# Patient Record
Sex: MALE | Race: WHITE | Employment: OTHER | URBAN - METROPOLITAN AREA
[De-identification: names, ages, dates, MRNs, and addresses within clinical notes are randomized per-mention and may not be internally consistent; named-entity substitution may affect disease eponyms.]

---

## 2019-02-07 ENCOUNTER — OFFICE VISIT (OUTPATIENT)
Dept: FAMILY MEDICINE CLINIC | Age: 74
End: 2019-02-07

## 2019-02-07 VITALS
OXYGEN SATURATION: 97 % | HEART RATE: 72 BPM | HEIGHT: 66 IN | BODY MASS INDEX: 28.61 KG/M2 | WEIGHT: 178 LBS | RESPIRATION RATE: 20 BRPM | SYSTOLIC BLOOD PRESSURE: 161 MMHG | DIASTOLIC BLOOD PRESSURE: 87 MMHG | TEMPERATURE: 95.6 F

## 2019-02-07 DIAGNOSIS — R73.03 BORDERLINE DIABETES MELLITUS: ICD-10-CM

## 2019-02-07 DIAGNOSIS — Z13.29 SCREENING FOR THYROID DISORDER: ICD-10-CM

## 2019-02-07 DIAGNOSIS — I10 HYPERTENSION GOAL BP (BLOOD PRESSURE) < 140/80: ICD-10-CM

## 2019-02-07 DIAGNOSIS — Z12.5 SCREENING PSA (PROSTATE SPECIFIC ANTIGEN): ICD-10-CM

## 2019-02-07 DIAGNOSIS — H61.23 BILATERAL IMPACTED CERUMEN: Primary | ICD-10-CM

## 2019-02-07 DIAGNOSIS — E78.5 DYSLIPIDEMIA (HIGH LDL; LOW HDL): ICD-10-CM

## 2019-02-07 DIAGNOSIS — R35.0 FREQUENCY OF URINATION: ICD-10-CM

## 2019-02-07 DIAGNOSIS — Z00.00 ENCOUNTER FOR MEDICARE ANNUAL WELLNESS EXAM: ICD-10-CM

## 2019-02-07 DIAGNOSIS — H91.93 HEARING DIFFICULTY OF BOTH EARS: ICD-10-CM

## 2019-02-07 DIAGNOSIS — E55.9 VITAMIN D DEFICIENCY: ICD-10-CM

## 2019-02-07 RX ORDER — VALSARTAN 40 MG/1
40 TABLET ORAL DAILY
Qty: 30 TAB | Refills: 3 | Status: SHIPPED | OUTPATIENT
Start: 2019-02-07 | End: 2019-03-01 | Stop reason: SDUPTHER

## 2019-02-07 NOTE — PROGRESS NOTES
Chief Complaint Patient presents with  New Patient  Dizziness  Memory Loss HPI: 
Fredrick Velasquez is a 74 y.o.   male presents to establish care. This is a Subsequent Medicare Annual Wellness Exam (AWV) (Performed 12 months after IPPE or effective date of Medicare Part B enrollment) I have reviewed the patient's medical history in detail and updated the computerized patient record. History History reviewed. No pertinent past medical history. History reviewed. No pertinent surgical history. No Known Allergies History reviewed. No pertinent family history. Social History Tobacco Use  Smoking status: Never Smoker  Smokeless tobacco: Never Used Substance Use Topics  Alcohol use: Yes Comment: socially There is no problem list on file for this patient. Depression Risk Factor Screening: PHQ over the last two weeks 2/7/2019 Feeling down, depressed, irritable, or hopeless Not at all Alcohol Risk Factor Screening: You average more than 14 drinks a week. Functional Ability and Level of Safety:  
Hearing Loss Hearing is good. The patient needs further evaluation. Activities of Daily Living The home contains: no safety equipment. Patient needs help with:  transportation, shopping, preparing meals, housework and managing money Fall Risk Fall Risk Assessment, last 12 mths 2/7/2019 Able to walk? Yes Fall in past 12 months? Yes Fall with injury? Yes  
Number of falls in past 12 months 4 Fall Risk Score 5 Abuse Screen Patient is not abused Cognitive Screening Evaluation of Cognitive Function: 
Has your family/caregiver stated any concerns about your memory: no 
Normal 
 
Patient Care Team  
Patient Care Team: 
Peggyann Hashimoto, MD as PCP - General (Internal Medicine) Assessment/Plan Education and counseling provided: 
Are appropriate based on today's review and evaluation Diagnoses and all orders for this visit: 
 
 Bilateral impacted cerumen -     REMOVE IMPACTED EAR WAX Encounter for Medicare annual wellness exam 
-     METABOLIC PANEL, COMPREHENSIVE 
-     CBC WITH AUTOMATED DIFF Hypertension goal BP (blood pressure) < 181/94 
-     METABOLIC PANEL, COMPREHENSIVE 
-     valsartan (DIOVAN) 40 mg tablet; Take 1 Tab by mouth daily. , Print, Disp-30 Tab, R-3 Dyslipidemia (high LDL; low HDL) -     LIPID PANEL Borderline diabetes mellitus 
-     HEMOGLOBIN A1C WITH EAG Screening for thyroid disorder 
-     TSH 3RD GENERATION Vitamin D deficiency 
-     VITAMIN D, 25 HYDROXY Screening PSA (prostate specific antigen) -     PSA SCREENING (SCREENING) Frequency of urination 
-     URINALYSIS W/ RFLX MICROSCOPIC Hearing difficulty of both ears 
-     REFERRAL TO ENT-OTOLARYNGOLOGY Patient Instructions Earwax Blockage: Care Instructions Your Care Instructions Earwax is a natural substance that protects the ear canal. Normally, earwax drains from the ears and does not cause problems. Sometimes earwax builds up and hardens. Earwax blockage (also called cerumen impaction) can cause some loss of hearing and pain. When wax is tightly packed, you will need to have your doctor remove it. Follow-up care is a key part of your treatment and safety. Be sure to make and go to all appointments, and call your doctor if you are having problems. It's also a good idea to know your test results and keep a list of the medicines you take. How can you care for yourself at home? · Do not try to remove earwax with cotton swabs, fingers, or other objects. This can make the blockage worse and damage the eardrum. · If your doctor recommends that you try to remove earwax at home: ? Soften and loosen the earwax with warm mineral oil. You also can try hydrogen peroxide mixed with an equal amount of room temperature water.  Place 2 drops of the fluid, warmed to body temperature, in the ear two times a day for up to 5 days. ? Once the wax is loose and soft, all that is usually needed to remove it from the ear canal is a gentle, warm shower. Direct the water into the ear, then tip your head to let the earwax drain out. Dry your ear thoroughly with a hair dryer set on low. Hold the dryer several inches from your ear. ? If the warm mineral oil and shower do not work, use an over-the-counter wax softener. Read and follow all instructions on the label. After using the wax softener, use an ear syringe to gently flush the ear. Make sure the flushing solution is body temperature. Cool or hot fluids in the ear can cause dizziness. When should you call for help? Call your doctor now or seek immediate medical care if: 
  · Pus or blood drains from your ear.  
  · Your ears are ringing or feel full.  
  · You have a loss of hearing.  
 Watch closely for changes in your health, and be sure to contact your doctor if: 
  · You have pain or reduced hearing after 1 week of home treatment.  
  · You have any new symptoms, such as nausea or balance problems. Where can you learn more? Go to http://engrita-anton.info/. Enter F022 in the search box to learn more about \"Earwax Blockage: Care Instructions. \" Current as of: September 23, 2018 Content Version: 11.9 © 6835-4331 [employer]. Care instructions adapted under license by [employer] (which disclaims liability or warranty for this information). If you have questions about a medical condition or this instruction, always ask your healthcare professional. [place] [address] any warranty or liability for your use of this information. Follow-up Disposition: 
Return in about 4 weeks (around 3/7/2019), or if symptoms worsen or fail to improve, for f/u results.

## 2019-02-07 NOTE — PATIENT INSTRUCTIONS
Earwax Blockage: Care Instructions Your Care Instructions Earwax is a natural substance that protects the ear canal. Normally, earwax drains from the ears and does not cause problems. Sometimes earwax builds up and hardens. Earwax blockage (also called cerumen impaction) can cause some loss of hearing and pain. When wax is tightly packed, you will need to have your doctor remove it. Follow-up care is a key part of your treatment and safety. Be sure to make and go to all appointments, and call your doctor if you are having problems. It's also a good idea to know your test results and keep a list of the medicines you take. How can you care for yourself at home? · Do not try to remove earwax with cotton swabs, fingers, or other objects. This can make the blockage worse and damage the eardrum. · If your doctor recommends that you try to remove earwax at home: ? Soften and loosen the earwax with warm mineral oil. You also can try hydrogen peroxide mixed with an equal amount of room temperature water. Place 2 drops of the fluid, warmed to body temperature, in the ear two times a day for up to 5 days. ? Once the wax is loose and soft, all that is usually needed to remove it from the ear canal is a gentle, warm shower. Direct the water into the ear, then tip your head to let the earwax drain out. Dry your ear thoroughly with a hair dryer set on low. Hold the dryer several inches from your ear. ? If the warm mineral oil and shower do not work, use an over-the-counter wax softener. Read and follow all instructions on the label. After using the wax softener, use an ear syringe to gently flush the ear. Make sure the flushing solution is body temperature. Cool or hot fluids in the ear can cause dizziness. When should you call for help? Call your doctor now or seek immediate medical care if: 
  · Pus or blood drains from your ear.  
  · Your ears are ringing or feel full.  
  · You have a loss of hearing.  Watch closely for changes in your health, and be sure to contact your doctor if: 
  · You have pain or reduced hearing after 1 week of home treatment.  
  · You have any new symptoms, such as nausea or balance problems. Where can you learn more? Go to http://negrita-anton.info/. Enter M796 in the search box to learn more about \"Earwax Blockage: Care Instructions. \" Current as of: September 23, 2018 Content Version: 11.9 © 9231-1023 [employer]. Care instructions adapted under license by [employer] (which disclaims liability or warranty for this information). If you have questions about a medical condition or this instruction, always ask your healthcare professional. [address] any warranty or liability for your use of this information.

## 2019-02-08 ENCOUNTER — HOSPITAL ENCOUNTER (OUTPATIENT)
Dept: LAB | Age: 74
Discharge: HOME | End: 2019-02-08
Payer: MEDICARE

## 2019-02-08 PROCEDURE — 80061 LIPID PANEL: CPT

## 2019-02-08 PROCEDURE — 85025 COMPLETE CBC W/AUTO DIFF WBC: CPT

## 2019-02-08 PROCEDURE — 36415 COLL VENOUS BLD VENIPUNCTURE: CPT

## 2019-02-08 PROCEDURE — 84443 ASSAY THYROID STIM HORMONE: CPT

## 2019-02-08 PROCEDURE — 83036 HEMOGLOBIN GLYCOSYLATED A1C: CPT

## 2019-02-08 PROCEDURE — 80053 COMPREHEN METABOLIC PANEL: CPT

## 2019-02-08 PROCEDURE — 82306 VITAMIN D 25 HYDROXY: CPT

## 2019-02-08 PROCEDURE — 84153 ASSAY OF PSA TOTAL: CPT

## 2019-02-08 PROCEDURE — 81003 URINALYSIS AUTO W/O SCOPE: CPT

## 2019-02-09 LAB
25(OH)D3+25(OH)D2 SERPL-MCNC: 29.9 NG/ML (ref 30–100)
ALBUMIN SERPL-MCNC: 4.5 G/DL (ref 3.5–4.8)
ALBUMIN/GLOB SERPL: 1.7 {RATIO} (ref 1.2–2.2)
ALP SERPL-CCNC: 60 IU/L (ref 39–117)
ALT SERPL-CCNC: 10 IU/L (ref 0–44)
APPEARANCE UR: CLEAR
AST SERPL-CCNC: 13 IU/L (ref 0–40)
BASOPHILS # BLD AUTO: 0 X10E3/UL (ref 0–0.2)
BASOPHILS NFR BLD AUTO: 0 %
BILIRUB SERPL-MCNC: 0.7 MG/DL (ref 0–1.2)
BILIRUB UR QL STRIP: NEGATIVE
BUN SERPL-MCNC: 14 MG/DL (ref 8–27)
BUN/CREAT SERPL: 13 (ref 10–24)
CALCIUM SERPL-MCNC: 9.3 MG/DL (ref 8.6–10.2)
CHLORIDE SERPL-SCNC: 98 MMOL/L (ref 96–106)
CHOLEST SERPL-MCNC: 238 MG/DL (ref 100–199)
CO2 SERPL-SCNC: 21 MMOL/L (ref 20–29)
COLOR UR: YELLOW
CREAT SERPL-MCNC: 1.04 MG/DL (ref 0.76–1.27)
EOSINOPHIL # BLD AUTO: 0.2 X10E3/UL (ref 0–0.4)
EOSINOPHIL NFR BLD AUTO: 3 %
ERYTHROCYTE [DISTWIDTH] IN BLOOD BY AUTOMATED COUNT: 13.6 % (ref 12.3–15.4)
EST. AVERAGE GLUCOSE BLD GHB EST-MCNC: 105 MG/DL
GLOBULIN SER CALC-MCNC: 2.7 G/DL (ref 1.5–4.5)
GLUCOSE SERPL-MCNC: 85 MG/DL (ref 65–99)
GLUCOSE UR QL: NEGATIVE
HBA1C MFR BLD: 5.3 % (ref 4.8–5.6)
HCT VFR BLD AUTO: 41.2 % (ref 37.5–51)
HDLC SERPL-MCNC: 53 MG/DL
HGB BLD-MCNC: 14.3 G/DL (ref 13–17.7)
HGB UR QL STRIP: NEGATIVE
IMM GRANULOCYTES # BLD AUTO: 0 X10E3/UL (ref 0–0.1)
IMM GRANULOCYTES NFR BLD AUTO: 0 %
KETONES UR QL STRIP: NEGATIVE
LDLC SERPL CALC-MCNC: 163 MG/DL (ref 0–99)
LEUKOCYTE ESTERASE UR QL STRIP: NEGATIVE
LYMPHOCYTES # BLD AUTO: 1.6 X10E3/UL (ref 0.7–3.1)
LYMPHOCYTES NFR BLD AUTO: 20 %
MCH RBC QN AUTO: 32.5 PG (ref 26.6–33)
MCHC RBC AUTO-ENTMCNC: 34.7 G/DL (ref 31.5–35.7)
MCV RBC AUTO: 94 FL (ref 79–97)
MICRO URNS: NORMAL
MONOCYTES # BLD AUTO: 1 X10E3/UL (ref 0.1–0.9)
MONOCYTES NFR BLD AUTO: 13 %
NEUTROPHILS # BLD AUTO: 4.8 X10E3/UL (ref 1.4–7)
NEUTROPHILS NFR BLD AUTO: 64 %
NITRITE UR QL STRIP: NEGATIVE
PH UR STRIP: 5.5 [PH] (ref 5–7.5)
PLATELET # BLD AUTO: 328 X10E3/UL (ref 150–379)
POTASSIUM SERPL-SCNC: 4.7 MMOL/L (ref 3.5–5.2)
PROT SERPL-MCNC: 7.2 G/DL (ref 6–8.5)
PROT UR QL STRIP: NEGATIVE
PSA SERPL-MCNC: 1 NG/ML (ref 0–4)
RBC # BLD AUTO: 4.4 X10E6/UL (ref 4.14–5.8)
SODIUM SERPL-SCNC: 135 MMOL/L (ref 134–144)
SP GR UR: 1.02 (ref 1–1.03)
TRIGL SERPL-MCNC: 112 MG/DL (ref 0–149)
TSH SERPL DL<=0.005 MIU/L-ACNC: 1.54 UIU/ML (ref 0.45–4.5)
UROBILINOGEN UR STRIP-MCNC: 0.2 MG/DL (ref 0.2–1)
VLDLC SERPL CALC-MCNC: 22 MG/DL (ref 5–40)
WBC # BLD AUTO: 7.7 X10E3/UL (ref 3.4–10.8)

## 2019-02-15 DIAGNOSIS — E55.9 VITAMIN D DEFICIENCY: Primary | ICD-10-CM

## 2019-02-15 RX ORDER — CHOLECALCIFEROL TAB 125 MCG (5000 UNIT) 125 MCG
5000 TAB ORAL DAILY
Qty: 90 TAB | Refills: 1 | Status: SHIPPED | OUTPATIENT
Start: 2019-02-15 | End: 2019-03-01 | Stop reason: SDUPTHER

## 2019-03-01 ENCOUNTER — OFFICE VISIT (OUTPATIENT)
Dept: FAMILY MEDICINE CLINIC | Age: 74
End: 2019-03-01

## 2019-03-01 VITALS
HEIGHT: 66 IN | WEIGHT: 178 LBS | DIASTOLIC BLOOD PRESSURE: 69 MMHG | BODY MASS INDEX: 28.61 KG/M2 | OXYGEN SATURATION: 96 % | TEMPERATURE: 95.1 F | HEART RATE: 76 BPM | SYSTOLIC BLOOD PRESSURE: 102 MMHG | RESPIRATION RATE: 20 BRPM

## 2019-03-01 DIAGNOSIS — E78.00 HYPERCHOLESTEROLEMIA: ICD-10-CM

## 2019-03-01 DIAGNOSIS — I10 HYPERTENSION, WELL CONTROLLED: Primary | ICD-10-CM

## 2019-03-01 DIAGNOSIS — E55.9 VITAMIN D DEFICIENCY: ICD-10-CM

## 2019-03-01 DIAGNOSIS — R41.3 MEMORY LOSS: ICD-10-CM

## 2019-03-01 RX ORDER — CHOLECALCIFEROL TAB 125 MCG (5000 UNIT) 125 MCG
5000 TAB ORAL DAILY
Qty: 90 TAB | Refills: 1 | Status: SHIPPED | OUTPATIENT
Start: 2019-03-01 | End: 2019-10-17 | Stop reason: SDUPTHER

## 2019-03-01 RX ORDER — PRAVASTATIN SODIUM 10 MG/1
10 TABLET ORAL
Qty: 30 TAB | Refills: 5 | Status: SHIPPED | OUTPATIENT
Start: 2019-03-01 | End: 2019-10-17 | Stop reason: SDUPTHER

## 2019-03-01 RX ORDER — VALSARTAN 40 MG/1
40 TABLET ORAL DAILY
Qty: 30 TAB | Refills: 3 | Status: SHIPPED | OUTPATIENT
Start: 2019-03-01 | End: 2019-07-21 | Stop reason: SDUPTHER

## 2019-03-01 NOTE — PATIENT INSTRUCTIONS
Learning About Vitamin D  Why is it important to get enough vitamin D? Your body needs vitamin D to absorb calcium. Calcium keeps your bones and muscles, including your heart, healthy and strong. If your muscles don't get enough calcium, they can cramp, hurt, or feel weak. You may have long-term (chronic) muscle aches and pains. If you don't get enough vitamin D throughout life, you have an increased chance of having thin and brittle bones (osteoporosis) in your later years. Children who don't get enough vitamin D may not grow as much as others their age. They also have a chance of getting a rare disease called rickets. It causes weak bones. Vitamin D and calcium are added to many foods. And your body uses sunshine to make its own vitamin D. How much vitamin D do you need? The [place] of Medicine recommends that people ages 3 through 79 get 600 IU (international units) every day. Adults 71 and older need 800 IU every day. Blood tests for vitamin D can check your vitamin D level. But there is no standard normal range used by all laboratories. You're likely getting enough vitamin D if your levels are in the range of 20 to 50 ng/mL. How can you get more vitamin D? Foods that contain vitamin D include:  · [place], tuna, and mackerel. These are some of the best foods to eat when you need to get more vitamin D.  · Cheese, egg yolks, and beef liver. These foods have vitamin D in small amounts. · Milk, soy drinks, orange juice, yogurt, margarine, and some kinds of cereal have vitamin D added to them. Some people don't make vitamin D as well as others. They may have to take extra care in getting enough vitamin D. Things that reduce how much vitamin D your body makes include:  · Dark skin, such as many  Americans have. · Age, especially if you are older than 72. · Digestive problems, such as Crohn's or celiac disease. · Liver and kidney disease.   Some people who do not get enough vitamin D may need supplements. Are there any risks from taking vitamin D?  · Too much vitamin D:  ? Can damage your kidneys. ? Can cause nausea and vomiting, constipation, and weakness. ? Raises the amount of calcium in your blood. If this happens, you can get confused or have an irregular heart rhythm. · Vitamin D may interact with other medicines. Tell your doctor about all of the medicines you take, including over-the-counter drugs, herbs, and pills. Tell your doctor about all of your current medical problems. Where can you learn more? Go to http://negrita-anton.info/. Enter [id number] in the search box to learn more about \"Learning About Vitamin D.\"  Current as of: March 28, 2018  Content Version: 11.9  © 9531-9042 [employer]. Care instructions adapted under license by [employer] (which disclaims liability or warranty for this information). If you have questions about a medical condition or this instruction, always ask your healthcare professional. [place] [address] any warranty or liability for your use of this information.

## 2019-03-01 NOTE — PROGRESS NOTES
Chief Complaint   Patient presents with    Follow-up     3-4 weeks    Referral Follow Up     requesting a neurologist     HPI:  Ashely Vazquez is a 74 y.o.  male with hypertesion, memory loss presents for 3-4 week follow up on results. Referral Follow Up (requesting a neurologist)  Serum vit D level is noted to be slightly below normal, tot cholesterol and LDL are trending up. Otherwise the rest of results are within normal limts  Patient agrees to start vit d supplement and statin for cholesterol. Side effects of medications have been discussed. During last encounter he was referred to ENT for difficulty hearing. Patient did see ENT who remamded he gets a neurologic evaluation. He also advised to get hearing aid. Review of Systems  As per hpi    No past surgical history on file. Social History     Socioeconomic History    Marital status:      Spouse name: Not on file    Number of children: Not on file    Years of education: Not on file    Highest education level: Not on file   Tobacco Use    Smoking status: Never Smoker    Smokeless tobacco: Never Used   Substance and Sexual Activity    Alcohol use: Yes     Comment: socially    Drug use: No    Sexual activity: No     No family history on file. Current Outpatient Medications   Medication Sig Dispense Refill    cholecalciferol, VITAMIN D3, (VITAMIN D3) 5,000 unit tab tablet Take 1 Tab by mouth daily. 90 Tab 1    valsartan (DIOVAN) 40 mg tablet Take 1 Tab by mouth daily.  30 Tab 3     No Known Allergies    Objective:  Visit Vitals  /69   Pulse 76   Temp 95.1 °F (35.1 °C) (Oral)   Resp 20   Ht 5' 6\" (1.676 m)   Wt 178 lb (80.7 kg)   SpO2 96%   BMI 28.73 kg/m²     Physical Exam:   General appearance - alert, well appearing in no distress  Mental status - alert, oriented to person, place, and time  EYE-PERRL, EO  Neck - supple, no significant adenopathy   Chest - clear to auscultation, no wheezes, rales or rhonchi  Heart - normal rate, regular rhythm, normal blood pressure  Abdomen - soft, nontender, nondistended, no masses or organomegaly  Ext-peripheral pulses normal, no pedal edema,  Neuro -alert, oriented, normal speech, no focal findings  Back-full range of motion, no tenderness, palpable spasm or pain on motion     Results for orders placed or performed in visit on 49/46/41   METABOLIC PANEL, COMPREHENSIVE   Result Value Ref Range    Glucose 85 65 - 99 mg/dL    BUN 14 8 - 27 mg/dL    Creatinine 1.04 0.76 - 1.27 mg/dL    GFR est non-AA 70 >59 mL/min/1.73    GFR est AA 81 >59 mL/min/1.73    BUN/Creatinine ratio 13 10 - 24    Sodium 135 134 - 144 mmol/L    Potassium 4.7 3.5 - 5.2 mmol/L    Chloride 98 96 - 106 mmol/L    CO2 21 20 - 29 mmol/L    Calcium 9.3 8.6 - 10.2 mg/dL    Protein, total 7.2 6.0 - 8.5 g/dL    Albumin 4.5 3.5 - 4.8 g/dL    GLOBULIN, TOTAL 2.7 1.5 - 4.5 g/dL    A-G Ratio 1.7 1.2 - 2.2    Bilirubin, total 0.7 0.0 - 1.2 mg/dL    Alk. phosphatase 60 39 - 117 IU/L    AST (SGOT) 13 0 - 40 IU/L    ALT (SGPT) 10 0 - 44 IU/L   CBC WITH AUTOMATED DIFF   Result Value Ref Range    WBC 7.7 3.4 - 10.8 x10E3/uL    RBC 4.40 4. 14 - 5.80 x10E6/uL    HGB 14.3 13.0 - 17.7 g/dL    HCT 41.2 37.5 - 51.0 %    MCV 94 79 - 97 fL    MCH 32.5 26.6 - 33.0 pg    MCHC 34.7 31.5 - 35.7 g/dL    RDW 13.6 12.3 - 15.4 %    PLATELET 495 273 - 714 x10E3/uL    NEUTROPHILS 64 Not Estab. %    Lymphocytes 20 Not Estab. %    MONOCYTES 13 Not Estab. %    EOSINOPHILS 3 Not Estab. %    BASOPHILS 0 Not Estab. %    ABS. NEUTROPHILS 4.8 1.4 - 7.0 x10E3/uL    Abs Lymphocytes 1.6 0.7 - 3.1 x10E3/uL    ABS. MONOCYTES 1.0 (H) 0.1 - 0.9 x10E3/uL    ABS. EOSINOPHILS 0.2 0.0 - 0.4 x10E3/uL    ABS. BASOPHILS 0.0 0.0 - 0.2 x10E3/uL    IMMATURE GRANULOCYTES 0 Not Estab. %    ABS. IMM.  GRANS. 0.0 0.0 - 0.1 x10E3/uL   LIPID PANEL   Result Value Ref Range    Cholesterol, total 238 (H) 100 - 199 mg/dL    Triglyceride 112 0 - 149 mg/dL    HDL Cholesterol 53 >39 mg/dL    VLDL, calculated 22 5 - 40 mg/dL    LDL, calculated 163 (H) 0 - 99 mg/dL   HEMOGLOBIN A1C WITH EAG   Result Value Ref Range    Hemoglobin A1c 5.3 4.8 - 5.6 %    Estimated average glucose 105 mg/dL   TSH 3RD GENERATION   Result Value Ref Range    TSH 1.540 0.450 - 4.500 uIU/mL   VITAMIN D, 25 HYDROXY   Result Value Ref Range    VITAMIN D, 25-HYDROXY 29.9 (L) 30.0 - 100.0 ng/mL   PSA SCREENING (SCREENING)   Result Value Ref Range    Prostate Specific Ag 1.0 0.0 - 4.0 ng/mL   URINALYSIS W/ RFLX MICROSCOPIC   Result Value Ref Range    Specific Gravity 1.021 1.005 - 1.030    pH (UA) 5.5 5.0 - 7.5    Color Yellow Yellow    Appearance Clear Clear    Leukocyte Esterase Negative Negative    Protein Negative Negative/Trace    Glucose Negative Negative    Ketone Negative Negative    Blood Negative Negative    Bilirubin Negative Negative    Urobilinogen 0.2 0.2 - 1.0 mg/dL    Nitrites Negative Negative    Microscopic Examination Comment        Assessment/Plan:  Diagnoses and all orders for this visit:    Hypertension, well controlled  -     valsartan (DIOVAN) 40 mg tablet; Take 1 Tab by mouth daily. , Normal, Disp-30 Tab, R-3    Vitamin D deficiency  -     cholecalciferol, VITAMIN D3, (VITAMIN D3) 5,000 unit tab tablet; Take 1 Tab by mouth daily. , Normal, Disp-90 Tab, R-1    Hypercholesterolemia  -     pravastatin (PRAVACHOL) 10 mg tablet; Take 1 Tab by mouth nightly., Normal, Disp-30 Tab, R-5    Memory loss  -     REFERRAL TO NEUROLOGY  -     CT HEAD WO CONT; Future      Patient Instructions        Learning About Vitamin D  Why is it important to get enough vitamin D? Your body needs vitamin D to absorb calcium. Calcium keeps your bones and muscles, including your heart, healthy and strong. If your muscles don't get enough calcium, they can cramp, hurt, or feel weak. You may have long-term (chronic) muscle aches and pains.   If you don't get enough vitamin D throughout life, you have an increased chance of having thin and brittle bones (osteoporosis) in your later years. Children who don't get enough vitamin D may not grow as much as others their age. They also have a chance of getting a rare disease called rickets. It causes weak bones. Vitamin D and calcium are added to many foods. And your body uses sunshine to make its own vitamin D. How much vitamin D do you need? The [place] of Medicine recommends that people ages 3 through 79 get 600 IU (international units) every day. Adults 71 and older need 800 IU every day. Blood tests for vitamin D can check your vitamin D level. But there is no standard normal range used by all laboratories. You're likely getting enough vitamin D if your levels are in the range of 20 to 50 ng/mL. How can you get more vitamin D? Foods that contain vitamin D include:  · [place], tuna, and mackerel. These are some of the best foods to eat when you need to get more vitamin D.  · Cheese, egg yolks, and beef liver. These foods have vitamin D in small amounts. · Milk, soy drinks, orange juice, yogurt, margarine, and some kinds of cereal have vitamin D added to them. Some people don't make vitamin D as well as others. They may have to take extra care in getting enough vitamin D. Things that reduce how much vitamin D your body makes include:  · Dark skin, such as many  Americans have. · Age, especially if you are older than 72. · Digestive problems, such as Crohn's or celiac disease. · Liver and kidney disease. Some people who do not get enough vitamin D may need supplements. Are there any risks from taking vitamin D?  · Too much vitamin D:  ? Can damage your kidneys. ? Can cause nausea and vomiting, constipation, and weakness. ? Raises the amount of calcium in your blood. If this happens, you can get confused or have an irregular heart rhythm. · Vitamin D may interact with other medicines. Tell your doctor about all of the medicines you take, including over-the-counter drugs, herbs, and pills. Tell your doctor about all of your current medical problems. Where can you learn more? Go to http://negrita-anton.info/. Enter [id number] in the search box to learn more about \"Learning About Vitamin D.\"  Current as of: March 28, 2018  Content Version: 11.9  © 8720-4186 [employer]. Care instructions adapted under license by [employer] (which disclaims liability or warranty for this information). If you have questions about a medical condition or this instruction, always ask your healthcare professional. [address] any warranty or liability for your use of this information. Follow-up Disposition:  Return 3- 4 months, for routine follow up.

## 2019-03-08 ENCOUNTER — HOSPITAL ENCOUNTER (OUTPATIENT)
Dept: CT IMAGING | Age: 74
Discharge: HOME | End: 2019-03-08
Attending: INTERNAL MEDICINE
Payer: MEDICARE

## 2019-03-08 DIAGNOSIS — R41.3 MEMORY LOSS: ICD-10-CM

## 2019-03-08 PROCEDURE — 70450 CT HEAD/BRAIN W/O DYE: CPT

## 2019-07-21 DIAGNOSIS — I10 HYPERTENSION, WELL CONTROLLED: ICD-10-CM

## 2019-07-22 RX ORDER — VALSARTAN 40 MG/1
TABLET ORAL
Qty: 30 TAB | Refills: 0 | Status: SHIPPED | OUTPATIENT
Start: 2019-07-22 | End: 2019-09-24 | Stop reason: SDUPTHER

## 2019-09-24 DIAGNOSIS — I10 HYPERTENSION, WELL CONTROLLED: ICD-10-CM

## 2019-09-24 RX ORDER — VALSARTAN 40 MG/1
TABLET ORAL
Qty: 30 TAB | Refills: 0 | Status: SHIPPED | OUTPATIENT
Start: 2019-09-24 | End: 2019-10-17 | Stop reason: SDUPTHER

## 2019-10-17 ENCOUNTER — OFFICE VISIT (OUTPATIENT)
Dept: FAMILY MEDICINE CLINIC | Age: 74
End: 2019-10-17

## 2019-10-17 VITALS
BODY MASS INDEX: 28.45 KG/M2 | HEIGHT: 66 IN | DIASTOLIC BLOOD PRESSURE: 66 MMHG | OXYGEN SATURATION: 98 % | SYSTOLIC BLOOD PRESSURE: 106 MMHG | RESPIRATION RATE: 20 BRPM | WEIGHT: 177 LBS | TEMPERATURE: 97.1 F | HEART RATE: 87 BPM

## 2019-10-17 DIAGNOSIS — R35.0 FREQUENCY OF URINATION: ICD-10-CM

## 2019-10-17 DIAGNOSIS — E55.9 VITAMIN D DEFICIENCY: ICD-10-CM

## 2019-10-17 DIAGNOSIS — Z23 ENCOUNTER FOR IMMUNIZATION: ICD-10-CM

## 2019-10-17 DIAGNOSIS — I10 HYPERTENSION, WELL CONTROLLED: Primary | ICD-10-CM

## 2019-10-17 DIAGNOSIS — Z12.11 COLON CANCER SCREENING: ICD-10-CM

## 2019-10-17 DIAGNOSIS — E78.00 HYPERCHOLESTEROLEMIA: ICD-10-CM

## 2019-10-17 RX ORDER — CHOLECALCIFEROL TAB 125 MCG (5000 UNIT) 125 MCG
5000 TAB ORAL DAILY
Qty: 90 TAB | Refills: 1 | Status: SHIPPED | OUTPATIENT
Start: 2019-10-17

## 2019-10-17 RX ORDER — VALSARTAN 40 MG/1
40 TABLET ORAL DAILY
Qty: 90 TAB | Refills: 1 | Status: SHIPPED | OUTPATIENT
Start: 2019-10-17

## 2019-10-17 RX ORDER — PRAVASTATIN SODIUM 10 MG/1
10 TABLET ORAL
Qty: 90 TAB | Refills: 1 | Status: SHIPPED | OUTPATIENT
Start: 2019-10-17

## 2019-10-17 NOTE — PROGRESS NOTES
Chief Complaint   Patient presents with    Follow-up     placement evaluation, moving to [address] [place] [address],[address] assistance living      HPI:  Mik March is a 66 y.o.  male presents accompanied by a neighbor for placement evaluation. Patient is relocating to [place] to live in an [facility]. He is doing will and has no complaints. Blood pressure is at goal. He has no complaints at present. Review of Systems  As per hpi    Past Medical History:   Diagnosis Date    Hypercholesterolemia     Hypertension     Vitamin D deficiency      No past surgical history on file. Social History     Socioeconomic History    Marital status:      Spouse name: Not on file    Number of children: Not on file    Years of education: Not on file    Highest education level: Not on file   Tobacco Use    Smoking status: Never Smoker    Smokeless tobacco: Never Used   Substance and Sexual Activity    Alcohol use: Yes     Comment: socially    Drug use: No    Sexual activity: Never     No family history on file. Current Outpatient Medications   Medication Sig Dispense Refill    valsartan (DIOVAN) 40 mg tablet TAKE 1 TABLET BY MOUTH EVERY DAY 30 Tab 0    cholecalciferol, VITAMIN D3, (VITAMIN D3) 5,000 unit tab tablet Take 1 Tab by mouth daily. 90 Tab 1    pravastatin (PRAVACHOL) 10 mg tablet Take 1 Tab by mouth nightly.  30 Tab 5     No Known Allergies    Objective:  Visit Vitals  /66   Pulse 87   Temp 97.1 °F (36.2 °C) (Oral)   Resp 20   Ht 5' 6\" (1.676 m)   Wt 177 lb (80.3 kg)   SpO2 98%   BMI 28.57 kg/m²     Physical Exam:   General appearance - alert, well appearing in no distress  Mental status - alert, oriented to person, place, and time  EYE-PERRL, EOMI  ENT-ENT exam normal, no neck nodes or sinus tenderness  Mouth - mucous membranes moist, pharynx normal without lesions  Neck - supple, no JVD, no thyromegaly    Chest - clear to auscultation, no wheezes, rales or rhonchi  Heart - normal rate, regular rhythm, normal blood pressure   Abdomen - soft, nontender, nondistended, no organomegaly  Ext-peripheral pulses normal, no pedal edema  Skin-Warm and dry. no hyperpigmentation, no rash  Neuro -alert, oriented, normal speech, no focal findings     Results for orders placed or performed in visit on 82/07/34   METABOLIC PANEL, COMPREHENSIVE   Result Value Ref Range    Glucose 85 65 - 99 mg/dL    BUN 14 8 - 27 mg/dL    Creatinine 1.04 0.76 - 1.27 mg/dL    GFR est non-AA 70 >59 mL/min/1.73    GFR est AA 81 >59 mL/min/1.73    BUN/Creatinine ratio 13 10 - 24    Sodium 135 134 - 144 mmol/L    Potassium 4.7 3.5 - 5.2 mmol/L    Chloride 98 96 - 106 mmol/L    CO2 21 20 - 29 mmol/L    Calcium 9.3 8.6 - 10.2 mg/dL    Protein, total 7.2 6.0 - 8.5 g/dL    Albumin 4.5 3.5 - 4.8 g/dL    GLOBULIN, TOTAL 2.7 1.5 - 4.5 g/dL    A-G Ratio 1.7 1.2 - 2.2    Bilirubin, total 0.7 0.0 - 1.2 mg/dL    Alk. phosphatase 60 39 - 117 IU/L    AST (SGOT) 13 0 - 40 IU/L    ALT (SGPT) 10 0 - 44 IU/L   CBC WITH AUTOMATED DIFF   Result Value Ref Range    WBC 7.7 3.4 - 10.8 x10E3/uL    RBC 4.40 4. 14 - 5.80 x10E6/uL    HGB 14.3 13.0 - 17.7 g/dL    HCT 41.2 37.5 - 51.0 %    MCV 94 79 - 97 fL    MCH 32.5 26.6 - 33.0 pg    MCHC 34.7 31.5 - 35.7 g/dL    RDW 13.6 12.3 - 15.4 %    PLATELET 923 993 - 759 x10E3/uL    NEUTROPHILS 64 Not Estab. %    Lymphocytes 20 Not Estab. %    MONOCYTES 13 Not Estab. %    EOSINOPHILS 3 Not Estab. %    BASOPHILS 0 Not Estab. %    ABS. NEUTROPHILS 4.8 1.4 - 7.0 x10E3/uL    Abs Lymphocytes 1.6 0.7 - 3.1 x10E3/uL    ABS. MONOCYTES 1.0 (H) 0.1 - 0.9 x10E3/uL    ABS. EOSINOPHILS 0.2 0.0 - 0.4 x10E3/uL    ABS. BASOPHILS 0.0 0.0 - 0.2 x10E3/uL    IMMATURE GRANULOCYTES 0 Not Estab. %    ABS. IMM.  GRANS. 0.0 0.0 - 0.1 x10E3/uL   LIPID PANEL   Result Value Ref Range    Cholesterol, total 238 (H) 100 - 199 mg/dL    Triglyceride 112 0 - 149 mg/dL    HDL Cholesterol 53 >39 mg/dL    VLDL, calculated 22 5 - 40 mg/dL    LDL, calculated 163 (H) 0 - 99 mg/dL   HEMOGLOBIN A1C WITH EAG   Result Value Ref Range    Hemoglobin A1c 5.3 4.8 - 5.6 %    Estimated average glucose 105 mg/dL   TSH 3RD GENERATION   Result Value Ref Range    TSH 1.540 0.450 - 4.500 uIU/mL   VITAMIN D, 25 HYDROXY   Result Value Ref Range    VITAMIN D, 25-HYDROXY 29.9 (L) 30.0 - 100.0 ng/mL   PSA SCREENING (SCREENING)   Result Value Ref Range    Prostate Specific Ag 1.0 0.0 - 4.0 ng/mL   URINALYSIS W/ RFLX MICROSCOPIC   Result Value Ref Range    Specific Gravity 1.021 1.005 - 1.030    pH (UA) 5.5 5.0 - 7.5    Color Yellow Yellow    Appearance Clear Clear    Leukocyte Esterase Negative Negative    Protein Negative Negative/Trace    Glucose Negative Negative    Ketone Negative Negative    Blood Negative Negative    Bilirubin Negative Negative    Urobilinogen 0.2 0.2 - 1.0 mg/dL    Nitrites Negative Negative    Microscopic Examination Comment      Assessment/Plan:  Diagnoses and all orders for this visit:    Hypertension, well controlled  -     valsartan (DIOVAN) 40 mg tablet; Take 1 Tab by mouth daily. , Normal, Disp-90 Tab, R-1  -     METABOLIC PANEL, COMPREHENSIVE    Vitamin D deficiency  -     cholecalciferol, VITAMIN D3, (VITAMIN D3) 5,000 unit tab tablet; Take 1 Tab by mouth daily. , Normal, Disp-90 Tab, R-1  -     VITAMIN D, 25 HYDROXY    Hypercholesterolemia  -     pravastatin (PRAVACHOL) 10 mg tablet;  Take 1 Tab by mouth nightly., Normal, Disp-90 Tab, R-1  -     LIPID PANEL    Encounter for immunization  -     INFLUENZA VIRUS VACCINE, HIGH DOSE SEASONAL, PRESERVATIVE FREE  -     ND IMMUNIZ ADMIN,1 SINGLE/COMB VAC/TOXOID  -     varicella-zoster recombinant, PF, (SHINGRIX, PF,) 50 mcg/0.5 mL susr injection; 0.5 mL by IntraMUSCular route once for 1 dose., Print, Disp-0.5 mL, R-0    Colon cancer screening  -     OCCULT BLOOD IMMUNOASSAY,DIAGNOSTIC    Frequency of urination  -     URINALYSIS W/ RFLX MICROSCOPIC    Other orders  -     Cancel: ZOSTER VACC RECOMBINANT ADJUVANTED      Patient Instructions          Colon Cancer Screening: Care Instructions  Your Care Instructions    Colorectal cancer occurs in the colon or rectum. That's the lower part of your digestive system. It is the second-leading cause of cancer deaths in the [place]. It often starts with small growths called polyps in the colon or rectum. Polyps are usually found with screening tests. Depending on the type of test, any polyps found may be removed during the tests. Colorectal cancer usually does not cause symptoms at first. But regular tests can help find it early, before it spreads and becomes harder to treat. Your risk for colorectal cancer gets higher as you get older. Some experts say that adults should start regular screening at age 48 and stop at age 76. Others say to start before age 48 or continue after age 76. Talk with your doctor about your risk and when to start and stop screening. You may have one of several tests. Follow-up care is a key part of your treatment and safety. Be sure to make and go to all appointments, and call your doctor if you are having problems. It's also a good idea to know your test results and keep a list of the medicines you take. What are the main screening tests for colon cancer? · Stool tests. These include the fecal immunochemical test (FIT) and the fecal occult blood test (FOBT). These tests check stool samples for signs of cancer. If your test is positive, you will need to have a colonoscopy. · Sigmoidoscopy. This test lets your doctor look at the lining of your rectum and the lowest part of your colon. Your doctor uses a lighted tube called a sigmoidoscope. This test can't find cancers or polyps in the upper part of your colon. In some cases, polyps that are found can be removed. But if your doctor finds polyps, you will need to have a colonoscopy to check the upper part of your colon. · Colonoscopy.  This test lets your doctor look at the lining of your rectum and your entire colon. The doctor uses a thin, flexible tool called a colonoscope. It can also be used to remove polyps or get a tissue sample (biopsy). What tests do you need? The following guidelines are for adults who are not at high risk for colorectal cancer. You may have at least one of these tests as directed by your doctor. · Fecal immunochemical test (FIT) or guaiac fecal occult blood test (gFOBT) every year  · Sigmoidoscopy every 5 years  · Colonoscopy every 10 years  If you are over age 76, you can work with your doctor to decide if screening is a good option. Talk with your doctor about when you need to be tested. And discuss which tests are right for you. Your doctor may recommend earlier or more frequent testing if you:  · Have had colorectal cancer before. · Have had colon polyps. · Have symptoms of colorectal cancer. These include blood in your stool and changes in your bowel habits. · Have a parent, brother or sister, or child with colon polyps or colorectal cancer. · Have a bowel disease. This includes ulcerative colitis and Crohn's disease. · Have a rare polyp syndrome that runs in families, such as familial adenomatous polyposis (FAP). · Have had radiation treatments to the belly or pelvis. When should you call for help? Watch closely for changes in your health, and be sure to contact your doctor if:    · You have any changes in your bowel habits.     · You have any problems. Where can you learn more? Go to http://negrita-anton.info/. Enter M541 in the search box to learn more about \"Colon Cancer Screening: Care Instructions. \"  Current as of: December 19, 2018  Content Version: 12.2  © 1784-4523 [employer]. Care instructions adapted under license by [employer] (which disclaims liability or warranty for this information).  If you have questions about a medical condition or this instruction, always ask your healthcare professional. [employer] disclaims any warranty or liability for your use of this information. Follow-up and Dispositions    · Return in about 3 months (around 1/17/2020), or if symptoms worsen or fail to improve, for when ever your back in [place].

## 2019-10-17 NOTE — PATIENT INSTRUCTIONS
Colon Cancer Screening: Care Instructions Your Care Instructions Colorectal cancer occurs in the colon or rectum. That's the lower part of your digestive system. It is the second-leading cause of cancer deaths in the [place]. It often starts with small growths called polyps in the colon or rectum. Polyps are usually found with screening tests. Depending on the type of test, any polyps found may be removed during the tests. Colorectal cancer usually does not cause symptoms at first. But regular tests can help find it early, before it spreads and becomes harder to treat. Your risk for colorectal cancer gets higher as you get older. Some experts say that adults should start regular screening at age 48 and stop at age 76. Others say to start before age 48 or continue after age 76. Talk with your doctor about your risk and when to start and stop screening. You may have one of several tests. Follow-up care is a key part of your treatment and safety. Be sure to make and go to all appointments, and call your doctor if you are having problems. It's also a good idea to know your test results and keep a list of the medicines you take. What are the main screening tests for colon cancer? · Stool tests. These include the fecal immunochemical test (FIT) and the fecal occult blood test (FOBT). These tests check stool samples for signs of cancer. If your test is positive, you will need to have a colonoscopy. · Sigmoidoscopy. This test lets your doctor look at the lining of your rectum and the lowest part of your colon. Your doctor uses a lighted tube called a sigmoidoscope. This test can't find cancers or polyps in the upper part of your colon. In some cases, polyps that are found can be removed. But if your doctor finds polyps, you will need to have a colonoscopy to check the upper part of your colon. · Colonoscopy.  This test lets your doctor look at the lining of your rectum and your entire colon. The doctor uses a thin, flexible tool called a colonoscope. It can also be used to remove polyps or get a tissue sample (biopsy). What tests do you need? The following guidelines are for adults who are not at high risk for colorectal cancer. You may have at least one of these tests as directed by your doctor. · Fecal immunochemical test (FIT) or guaiac fecal occult blood test (gFOBT) every year · Sigmoidoscopy every 5 years · Colonoscopy every 10 years If you are over age 76, you can work with your doctor to decide if screening is a good option. Talk with your doctor about when you need to be tested. And discuss which tests are right for you. Your doctor may recommend earlier or more frequent testing if you: 
· Have had colorectal cancer before. · Have had colon polyps. · Have symptoms of colorectal cancer. These include blood in your stool and changes in your bowel habits. · Have a parent, brother or sister, or child with colon polyps or colorectal cancer. · Have a bowel disease. This includes ulcerative colitis and Crohn's disease. · Have a rare polyp syndrome that runs in families, such as familial adenomatous polyposis (FAP). · Have had radiation treatments to the belly or pelvis. When should you call for help? Watch closely for changes in your health, and be sure to contact your doctor if: 
  · You have any changes in your bowel habits.  
  · You have any problems. Where can you learn more? Go to http://negrita-anton.info/. Enter M541 in the search box to learn more about \"Colon Cancer Screening: Care Instructions. \" Current as of: December 19, 2018 Content Version: 12.2 © 2115-7534 [employer]. Care instructions adapted under license by [employer] (which disclaims liability or warranty for this information).  If you have questions about a medical condition or this instruction, always ask your healthcare professional. [place] [address] any warranty or liability for your use of this information.

## 2019-10-18 ENCOUNTER — HOSPITAL ENCOUNTER (OUTPATIENT)
Dept: LAB | Age: 74
Discharge: HOME | End: 2019-10-18
Payer: MEDICARE

## 2019-10-18 PROCEDURE — 82306 VITAMIN D 25 HYDROXY: CPT

## 2019-10-18 PROCEDURE — 81003 URINALYSIS AUTO W/O SCOPE: CPT

## 2019-10-18 PROCEDURE — 80061 LIPID PANEL: CPT

## 2019-10-18 PROCEDURE — 80053 COMPREHEN METABOLIC PANEL: CPT

## 2019-10-18 PROCEDURE — 82270 OCCULT BLOOD FECES: CPT

## 2019-10-18 PROCEDURE — 36415 COLL VENOUS BLD VENIPUNCTURE: CPT

## 2019-10-19 LAB
25(OH)D3+25(OH)D2 SERPL-MCNC: 26.2 NG/ML (ref 30–100)
ALBUMIN SERPL-MCNC: 4.3 G/DL (ref 3.5–4.8)
ALBUMIN/GLOB SERPL: 1.5 {RATIO} (ref 1.2–2.2)
ALP SERPL-CCNC: 64 IU/L (ref 39–117)
ALT SERPL-CCNC: 13 IU/L (ref 0–44)
APPEARANCE UR: CLEAR
AST SERPL-CCNC: 14 IU/L (ref 0–40)
BILIRUB SERPL-MCNC: 0.5 MG/DL (ref 0–1.2)
BILIRUB UR QL STRIP: NEGATIVE
BUN SERPL-MCNC: 18 MG/DL (ref 8–27)
BUN/CREAT SERPL: 18 (ref 10–24)
CALCIUM SERPL-MCNC: 9.3 MG/DL (ref 8.6–10.2)
CHLORIDE SERPL-SCNC: 104 MMOL/L (ref 96–106)
CHOLEST SERPL-MCNC: 207 MG/DL (ref 100–199)
CO2 SERPL-SCNC: 23 MMOL/L (ref 20–29)
COLOR UR: YELLOW
CREAT SERPL-MCNC: 1.02 MG/DL (ref 0.76–1.27)
GLOBULIN SER CALC-MCNC: 2.8 G/DL (ref 1.5–4.5)
GLUCOSE SERPL-MCNC: 79 MG/DL (ref 65–99)
GLUCOSE UR QL: NEGATIVE
HDLC SERPL-MCNC: 56 MG/DL
HGB UR QL STRIP: NEGATIVE
KETONES UR QL STRIP: NEGATIVE
LDLC SERPL CALC-MCNC: 126 MG/DL (ref 0–99)
LEUKOCYTE ESTERASE UR QL STRIP: NEGATIVE
MICRO URNS: NORMAL
NITRITE UR QL STRIP: NEGATIVE
PH UR STRIP: 7 [PH] (ref 5–7.5)
POTASSIUM SERPL-SCNC: 4.5 MMOL/L (ref 3.5–5.2)
PROT SERPL-MCNC: 7.1 G/DL (ref 6–8.5)
PROT UR QL STRIP: NEGATIVE
SODIUM SERPL-SCNC: 140 MMOL/L (ref 134–144)
SP GR UR: 1.02 (ref 1–1.03)
TRIGL SERPL-MCNC: 124 MG/DL (ref 0–149)
UROBILINOGEN UR STRIP-MCNC: 1 MG/DL (ref 0.2–1)
VLDLC SERPL CALC-MCNC: 25 MG/DL (ref 5–40)

## 2019-10-22 ENCOUNTER — TELEPHONE (OUTPATIENT)
Dept: FAMILY MEDICINE CLINIC | Age: 74
End: 2019-10-22

## 2019-10-22 LAB — HEMOCCULT STL QL IA: NEGATIVE

## 2019-10-22 NOTE — TELEPHONE ENCOUNTER
----- Message from Rochester Medico sent at 10/22/2019  1:04 PM EDT -----  Regarding: Domah/telephone  Pts friend Jennifer Ramirez is requesting to know if the paper work has been faxed to [facility]. [facility] number is [phone].

## 2019-10-23 ENCOUNTER — TELEPHONE (OUTPATIENT)
Dept: FAMILY MEDICINE CLINIC | Age: 74
End: 2019-10-23

## 2019-10-25 DIAGNOSIS — Z11.1 SCREENING-PULMONARY TB: Primary | ICD-10-CM

## 2019-12-02 ENCOUNTER — OFFICE VISIT (OUTPATIENT)
Dept: FAMILY MEDICINE | Facility: CLINIC | Age: 74
End: 2019-12-02
Payer: MEDICARE

## 2019-12-02 VITALS
OXYGEN SATURATION: 98 % | HEIGHT: 67 IN | SYSTOLIC BLOOD PRESSURE: 106 MMHG | HEART RATE: 73 BPM | BODY MASS INDEX: 28.09 KG/M2 | DIASTOLIC BLOOD PRESSURE: 76 MMHG | TEMPERATURE: 99 F | WEIGHT: 179 LBS

## 2019-12-02 DIAGNOSIS — E55.9 VITAMIN D DEFICIENCY: ICD-10-CM

## 2019-12-02 DIAGNOSIS — E78.00 HYPERCHOLESTEREMIA: ICD-10-CM

## 2019-12-02 DIAGNOSIS — M40.04 POSTURAL KYPHOSIS OF THORACIC REGION: ICD-10-CM

## 2019-12-02 DIAGNOSIS — R68.89 FORGETFULNESS: ICD-10-CM

## 2019-12-02 DIAGNOSIS — I10 ESSENTIAL HYPERTENSION: Primary | ICD-10-CM

## 2019-12-02 DIAGNOSIS — R49.9 VOICE COMPLAINT: ICD-10-CM

## 2019-12-02 PROCEDURE — 1170F PR FUNCTIONAL STATUS ASSESSED: ICD-10-PCS | Mod: S$GLB,,, | Performed by: INTERNAL MEDICINE

## 2019-12-02 PROCEDURE — 1126F AMNT PAIN NOTED NONE PRSNT: CPT | Mod: S$GLB,,, | Performed by: INTERNAL MEDICINE

## 2019-12-02 PROCEDURE — 99202 OFFICE O/P NEW SF 15 MIN: CPT | Mod: S$GLB,,, | Performed by: INTERNAL MEDICINE

## 2019-12-02 PROCEDURE — 1159F MED LIST DOCD IN RCRD: CPT | Mod: S$GLB,,, | Performed by: INTERNAL MEDICINE

## 2019-12-02 PROCEDURE — 1159F PR MEDICATION LIST DOCUMENTED IN MEDICAL RECORD: ICD-10-PCS | Mod: S$GLB,,, | Performed by: INTERNAL MEDICINE

## 2019-12-02 PROCEDURE — 99202 PR OFFICE/OUTPT VISIT, NEW, LEVL II, 15-29 MIN: ICD-10-PCS | Mod: S$GLB,,, | Performed by: INTERNAL MEDICINE

## 2019-12-02 PROCEDURE — 1126F PR PAIN SEVERITY QUANTIFIED, NO PAIN PRESENT: ICD-10-PCS | Mod: S$GLB,,, | Performed by: INTERNAL MEDICINE

## 2019-12-02 PROCEDURE — 1170F FXNL STATUS ASSESSED: CPT | Mod: S$GLB,,, | Performed by: INTERNAL MEDICINE

## 2019-12-02 RX ORDER — VALSARTAN 40 MG/1
40 TABLET ORAL DAILY
COMMUNITY
End: 2019-12-02 | Stop reason: SDUPTHER

## 2019-12-02 RX ORDER — PRAVASTATIN SODIUM 10 MG/1
10 TABLET ORAL DAILY
COMMUNITY
End: 2020-03-09 | Stop reason: SDUPTHER

## 2019-12-02 RX ORDER — ACETAMINOPHEN AND PHENYLEPHRINE HCL 325; 5 MG/1; MG/1
1 TABLET ORAL DAILY
COMMUNITY
End: 2020-04-06 | Stop reason: SDUPTHER

## 2019-12-02 RX ORDER — VALSARTAN 40 MG/1
40 TABLET ORAL DAILY
Qty: 90 TABLET | Refills: 1 | Status: SHIPPED | OUTPATIENT
Start: 2019-12-02 | End: 2019-12-09 | Stop reason: SDUPTHER

## 2019-12-02 NOTE — PROGRESS NOTES
Subjective:       Patient ID: Amilcar Phillip is a 74 y.o. male.    Chief Complaint: Establish Care (continuity of care (discuss osteoporosis))    Here with his sister to establish care; he recently moved from [place] and is living in assisted living facility here.  He has a h/o HTN treated with valsartan.  They are concerned about the possibility of osteoporosis d/t the family history and the fact that he has kyphosis; no longer able to stand straight.   Also concerned because voice seems softer than it used to be.      Hypertension   This is a chronic problem. The problem is controlled. Pertinent negatives include no chest pain, headaches, malaise/fatigue, neck pain, palpitations, peripheral edema or shortness of breath. Past treatments include angiotensin blockers. There are no compliance problems.      Review of Systems   Constitutional: Negative for chills, fatigue, fever, malaise/fatigue and unexpected weight change.   HENT: Positive for hearing loss and voice change. Negative for congestion, postnasal drip, rhinorrhea and trouble swallowing.    Eyes: Negative for photophobia and visual disturbance.   Respiratory: Negative for apnea, cough, choking, chest tightness, shortness of breath and wheezing.    Cardiovascular: Negative for chest pain, palpitations and leg swelling.   Gastrointestinal: Negative for abdominal pain, blood in stool, constipation, diarrhea, nausea, rectal pain and vomiting.   Endocrine: Negative for cold intolerance, heat intolerance, polydipsia and polyuria.   Genitourinary: Negative for decreased urine volume, difficulty urinating, discharge, dysuria, flank pain, frequency, genital sores, hematuria, testicular pain and urgency.   Musculoskeletal: Positive for back pain. Negative for arthralgias, gait problem, joint swelling, myalgias and neck pain.   Skin: Negative for color change, rash and wound.   Allergic/Immunologic: Negative for environmental allergies and food allergies.    Neurological: Negative for dizziness, tremors, seizures, syncope, facial asymmetry, speech difficulty, weakness, light-headedness, numbness and headaches.   Hematological: Negative for adenopathy. Does not bruise/bleed easily.   Psychiatric/Behavioral: Positive for confusion (forgetful at times; mostly forgets where he puts things). Negative for hallucinations, sleep disturbance and suicidal ideas. The patient is not nervous/anxious.        Past Medical History:   Diagnosis Date    Hypercholesteremia     Hypertension     Vitamin D deficiency       Past Surgical History:   Procedure Laterality Date    COLONOSCOPY      HERNIA REPAIR         Family History   Problem Relation Age of Onset    Osteoporosis Mother     Vaginal cancer Mother     Stroke Father        Social History     Socioeconomic History    Marital status:      Spouse name: Not on file    Number of children: Not on file    Years of education: Not on file    Highest education level: Not on file   Occupational History    Occupation: retired   Social Needs    Financial resource strain: Not on file    Food insecurity:     Worry: Not on file     Inability: Not on file    Transportation needs:     Medical: Not on file     Non-medical: Not on file   Tobacco Use    Smoking status: Former Smoker     Types: Cigarettes, Cigars     Last attempt to quit: 2009     Years since quitting: 10.9    Smokeless tobacco: Never Used   Substance and Sexual Activity    Alcohol use: Yes     Frequency: 4 or more times a week     Drinks per session: 1 or 2    Drug use: Never    Sexual activity: Not Currently   Lifestyle    Physical activity:     Days per week: Not on file     Minutes per session: Not on file    Stress: Not at all   Relationships    Social connections:     Talks on phone: Not on file     Gets together: Not on file     Attends [employer] service: Not on file     Active member of club or organization: Not on file     Attends meetings of  "clubs or organizations: Not on file     Relationship status: Not on file   Other Topics Concern    Not on file   Social History Narrative    Live in assistive living center       Current Outpatient Medications   Medication Sig Dispense Refill    cholecalciferol, vitamin D3, 5,000 unit TbDL Take 1 tablet by mouth once daily.      multivit-min/FA/lycopen/lutein (CENTRUM SILVER MEN ORAL) Take 1 tablet by mouth once daily.      pravastatin (PRAVACHOL) 10 MG tablet Take 10 mg by mouth once daily.      valsartan (DIOVAN) 40 MG tablet Take 40 mg by mouth once daily.       No current facility-administered medications for this visit.        Review of patient's allergies indicates:  No Known Allergies  Objective:    HPI     Establish Care      Additional comments: continuity of care (discuss osteoporosis)          Last edited by Tomer Sampson MA on 12/2/2019  2:34 PM. (History)      Blood pressure 106/76, pulse 73, temperature 98.6 °F (37 °C), temperature source Temporal, height 5' 7" (1.702 m), weight 81.2 kg (179 lb), SpO2 98 %. Body mass index is 28.04 kg/m².   Physical Exam   Constitutional: He is oriented to person, place, and time. He appears well-developed. No distress.   HENT:   Nose: Nose normal.   Mouth/Throat: Oropharynx is clear and moist.   Eyes: Conjunctivae are normal. Right eye exhibits no discharge. Left eye exhibits no discharge. No scleral icterus.   Neck: Carotid bruit is not present.   Cardiovascular: Normal rate, regular rhythm and normal heart sounds.   No murmur heard.  Pulmonary/Chest: Effort normal and breath sounds normal. No respiratory distress. He has no decreased breath sounds. He has no wheezes. He has no rhonchi. He has no rales.   Abdominal: Soft. He exhibits no distension. There is no tenderness. There is no rebound and no guarding.   Musculoskeletal: He exhibits no edema.        Thoracic back: He exhibits deformity (kyphosis).   Neurological: He is alert and oriented to person, " place, and time. He displays no tremor. Gait (ambulation with cane) abnormal.   Skin: Skin is warm and dry.   Psychiatric: He has a normal mood and affect. His speech is normal.   Nursing note and vitals reviewed.          Assessment:       1. Essential hypertension    2. Hypercholesteremia    3. Vitamin D deficiency    4. Postural kyphosis of thoracic region    5. Voice complaint        Plan:       Amilcar was seen today for establish care.    Diagnoses and all orders for this visit:    Essential hypertension  -     valsartan (DIOVAN) 40 MG tablet; Take 1 tablet (40 mg total) by mouth once daily.    Hypercholesteremia  Comments:  Labs done about 6 weeks ago; will request records    Vitamin D deficiency    Postural kyphosis of thoracic region  Comments:  Insurance is only going to pay for DEXA if there is documentation of compression fractures.  Will get plain film    Voice complaint  Comments:  Observe for now

## 2019-12-06 ENCOUNTER — HOSPITAL ENCOUNTER (OUTPATIENT)
Dept: RADIOLOGY | Facility: HOSPITAL | Age: 74
Discharge: HOME | End: 2019-12-06
Attending: INTERNAL MEDICINE
Payer: MEDICARE

## 2019-12-06 DIAGNOSIS — M40.04 POSTURAL KYPHOSIS OF THORACIC REGION: ICD-10-CM

## 2019-12-06 PROCEDURE — 72070 X-RAY EXAM THORAC SPINE 2VWS: CPT | Mod: TC,PO

## 2019-12-09 DIAGNOSIS — I10 ESSENTIAL HYPERTENSION: ICD-10-CM

## 2019-12-09 RX ORDER — VALSARTAN 40 MG/1
40 TABLET ORAL DAILY
Qty: 90 TABLET | Refills: 1 | Status: SHIPPED | OUTPATIENT
Start: 2019-12-09 | End: 2020-12-11

## 2019-12-09 NOTE — TELEPHONE ENCOUNTER
----- Message from Angel Edwards Jr., MD sent at 12/9/2019  4:17 PM CST -----  No to both questions.  It's one of those it is what it is things  ----- Message -----  From: Tomer Sampson MA  Sent: 12/9/2019   4:05 PM CST  To: Angel Edwards Jr., MD     Patient want to know if there was anything that they can do to keep it from getting worst. Do he need to see a specialist.  ----- Message -----  From: Angel Edwards Jr., MD  Sent: 12/9/2019   8:45 AM CST  To: Angel Edwards Staff    Please call the patient regarding his abnormal result.  He has some scoliosis and degenerative changes but no evidence of vertebral compression fractures.  Does not need DEXA.

## 2019-12-09 NOTE — TELEPHONE ENCOUNTER
----- Message from Angel Edwards Jr., MD sent at 12/9/2019  8:45 AM CST -----  Please call the patient regarding his abnormal result.  He has some scoliosis and degenerative changes but no evidence of vertebral compression fractures.  Does not need DEXA.

## 2019-12-23 ENCOUNTER — HOSPITAL ENCOUNTER (EMERGENCY)
Facility: HOSPITAL | Age: 74
Discharge: NURSING, REHAB OR LONG-TERM CARE | End: 2019-12-24
Attending: EMERGENCY MEDICINE
Payer: MEDICARE

## 2019-12-23 DIAGNOSIS — R41.82 AMS (ALTERED MENTAL STATUS): ICD-10-CM

## 2019-12-23 DIAGNOSIS — B34.9 VIRAL SYNDROME: Primary | ICD-10-CM
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
## 2019-12-23 LAB
ALBUMIN SERPL BCP-MCNC: 4 G/DL (ref 3.5–5.2)
ALP SERPL-CCNC: 62 U/L (ref 55–135)
ALT SERPL W/O P-5'-P-CCNC: 21 U/L (ref 10–44)
ANION GAP SERPL CALC-SCNC: 8 MMOL/L (ref 8–16)
AST SERPL-CCNC: 19 U/L (ref 10–40)
BASOPHILS # BLD AUTO: 0.04 K/UL (ref 0–0.2)
BASOPHILS NFR BLD: 0.3 % (ref 0–1.9)
BILIRUB SERPL-MCNC: 0.8 MG/DL (ref 0.1–1)
BUN SERPL-MCNC: 12 MG/DL (ref 8–23)
CALCIUM SERPL-MCNC: 9.3 MG/DL (ref 8.7–10.5)
CHLORIDE SERPL-SCNC: 104 MMOL/L (ref 95–110)
CO2 SERPL-SCNC: 27 MMOL/L (ref 23–29)
CREAT SERPL-MCNC: 1 MG/DL (ref 0.5–1.4)
DIFFERENTIAL METHOD: ABNORMAL
EOSINOPHIL # BLD AUTO: 0.2 K/UL (ref 0–0.5)
EOSINOPHIL NFR BLD: 1.7 % (ref 0–8)
ERYTHROCYTE [DISTWIDTH] IN BLOOD BY AUTOMATED COUNT: 12.7 % (ref 11.5–14.5)
EST. GFR  (AFRICAN AMERICAN): >60 ML/MIN/1.73 M^2
EST. GFR  (NON AFRICAN AMERICAN): >60 ML/MIN/1.73 M^2
GLUCOSE SERPL-MCNC: 113 MG/DL (ref 70–110)
HCT VFR BLD AUTO: 40.3 % (ref 40–54)
HGB BLD-MCNC: 13.6 G/DL (ref 14–18)
IMM GRANULOCYTES # BLD AUTO: 0.05 K/UL (ref 0–0.04)
IMM GRANULOCYTES NFR BLD AUTO: 0.4 % (ref 0–0.5)
INFLUENZA A, MOLECULAR: NEGATIVE
INFLUENZA B, MOLECULAR: NEGATIVE
LDH SERPL L TO P-CCNC: 1.06 MMOL/L (ref 0.5–2.2)
LYMPHOCYTES # BLD AUTO: 1.1 K/UL (ref 1–4.8)
LYMPHOCYTES NFR BLD: 9.1 % (ref 18–48)
MCH RBC QN AUTO: 31.3 PG (ref 27–31)
MCHC RBC AUTO-ENTMCNC: 33.7 G/DL (ref 32–36)
MCV RBC AUTO: 93 FL (ref 82–98)
MONOCYTES # BLD AUTO: 1.1 K/UL (ref 0.3–1)
MONOCYTES NFR BLD: 9.4 % (ref 4–15)
NEUTROPHILS # BLD AUTO: 9.6 K/UL (ref 1.8–7.7)
NEUTROPHILS NFR BLD: 79.1 % (ref 38–73)
NRBC BLD-RTO: 0 /100 WBC
PLATELET # BLD AUTO: 316 K/UL (ref 150–350)
PMV BLD AUTO: 10.8 FL (ref 9.2–12.9)
POTASSIUM SERPL-SCNC: 3.9 MMOL/L (ref 3.5–5.1)
PROT SERPL-MCNC: 7.9 G/DL (ref 6–8.4)
RBC # BLD AUTO: 4.35 M/UL (ref 4.6–6.2)
SAMPLE: NORMAL
SODIUM SERPL-SCNC: 139 MMOL/L (ref 136–145)
SPECIMEN SOURCE: NORMAL
WBC # BLD AUTO: 12.11 K/UL (ref 3.9–12.7)

## 2019-12-23 PROCEDURE — 81003 URINALYSIS AUTO W/O SCOPE: CPT

## 2019-12-23 PROCEDURE — 36415 COLL VENOUS BLD VENIPUNCTURE: CPT

## 2019-12-23 PROCEDURE — 83605 ASSAY OF LACTIC ACID: CPT

## 2019-12-23 PROCEDURE — 99285 EMERGENCY DEPT VISIT HI MDM: CPT | Mod: 25

## 2019-12-23 PROCEDURE — 25000003 PHARM REV CODE 250: Performed by: EMERGENCY MEDICINE

## 2019-12-23 PROCEDURE — 87502 INFLUENZA DNA AMP PROBE: CPT

## 2019-12-23 PROCEDURE — 80053 COMPREHEN METABOLIC PANEL: CPT

## 2019-12-23 PROCEDURE — 63600175 PHARM REV CODE 636 W HCPCS: Performed by: EMERGENCY MEDICINE

## 2019-12-23 PROCEDURE — 93005 ELECTROCARDIOGRAM TRACING: CPT

## 2019-12-23 PROCEDURE — 85025 COMPLETE CBC W/AUTO DIFF WBC: CPT

## 2019-12-23 PROCEDURE — 87040 BLOOD CULTURE FOR BACTERIA: CPT

## 2019-12-23 RX ORDER — ACETAMINOPHEN 500 MG
1000 TABLET ORAL
Status: COMPLETED | OUTPATIENT
Start: 2019-12-23 | End: 2019-12-23

## 2019-12-23 RX ADMIN — SODIUM CHLORIDE 2382 ML: 0.9 INJECTION, SOLUTION INTRAVENOUS at 10:12

## 2019-12-23 RX ADMIN — ACETAMINOPHEN 1000 MG: 500 TABLET, FILM COATED ORAL at 10:12

## 2019-12-24 VITALS
WEIGHT: 175 LBS | DIASTOLIC BLOOD PRESSURE: 70 MMHG | SYSTOLIC BLOOD PRESSURE: 152 MMHG | HEART RATE: 99 BPM | BODY MASS INDEX: 27.47 KG/M2 | RESPIRATION RATE: 25 BRPM | TEMPERATURE: 101 F | OXYGEN SATURATION: 94 % | HEIGHT: 67 IN

## 2019-12-24 LAB
BILIRUB UR QL STRIP: NEGATIVE
CLARITY UR: CLEAR
COLOR UR: YELLOW
GLUCOSE UR QL STRIP: NEGATIVE
HGB UR QL STRIP: NEGATIVE
KETONES UR QL STRIP: ABNORMAL
LEUKOCYTE ESTERASE UR QL STRIP: NEGATIVE
NITRITE UR QL STRIP: NEGATIVE
PH UR STRIP: 8 [PH] (ref 5–8)
PROT UR QL STRIP: NEGATIVE
SP GR UR STRIP: 1.02 (ref 1–1.03)
URN SPEC COLLECT METH UR: ABNORMAL
UROBILINOGEN UR STRIP-ACNC: 1 EU/DL

## 2019-12-24 RX ORDER — DEXTROMETHORPHAN HYDROBROMIDE, GUAIFENESIN 5; 100 MG/5ML; MG/5ML
650 LIQUID ORAL EVERY 8 HOURS
Qty: 40 TABLET | Refills: 0 | Status: SHIPPED | OUTPATIENT
Start: 2019-12-24 | End: 2022-02-22

## 2019-12-24 NOTE — ED PROVIDER NOTES
"Encounter Date: 12/23/2019       History     Chief Complaint   Patient presents with    Fever     no complaints per pt / "nurse at [facility] sent me for fever"      HPI   74-year-old male with past medical history significant for high cholesterol, hypertension and dementia presents to the ER from [facility] with a fever.  Per the family or bedside, patient suffered a fall earlier tonight.  Patient states it was a mechanical fall and family supports that.  Patient denies loss of consciousness or any complaint and was able to ambulate after the fall.  Patient denies striking his head. When the nurse went to check on the patient she noted the patient to have a fever 102.  Patient.  Slightly confused and was sent to the ER for further evaluation.  Patient states that he has only a runny nose.  Patient denies cough, headache, nausea, vomiting, rash or any other somatic complaint.  Patient is confused and alert and oriented x2 which is his baseline per family who are bedside.  Review of patient's allergies indicates:  No Known Allergies  Past Medical History:   Diagnosis Date    Hypercholesteremia     Hypertension     Vitamin D deficiency      Past Surgical History:   Procedure Laterality Date    COLONOSCOPY      HERNIA REPAIR       Family History   Problem Relation Age of Onset    Osteoporosis Mother     Vaginal cancer Mother     Stroke Father      Social History     Tobacco Use    Smoking status: Former Smoker     Types: Cigarettes, Cigars     Last attempt to quit: 2009     Years since quitting: 10.9    Smokeless tobacco: Never Used   Substance Use Topics    Alcohol use: Yes     Frequency: 4 or more times a week     Drinks per session: 1 or 2    Drug use: Never     Review of Systems   Constitutional: Positive for chills and fever. Negative for appetite change and diaphoresis.   HENT: Negative for ear pain, rhinorrhea, sore throat, trouble swallowing and voice change.    Eyes: Negative " for pain, discharge, redness and visual disturbance.   Respiratory: Negative for cough, chest tightness and shortness of breath.    Cardiovascular: Negative for chest pain and leg swelling.   Gastrointestinal: Negative for abdominal pain, constipation, diarrhea, nausea and vomiting.   Genitourinary: Negative for difficulty urinating, dysuria, flank pain, frequency and urgency.   Musculoskeletal: Negative for arthralgias, back pain and myalgias.   Skin: Negative for rash.   Neurological: Negative for dizziness, syncope, speech difficulty, weakness, light-headedness, numbness and headaches.   Psychiatric/Behavioral: Positive for confusion.       Physical Exam     Initial Vitals   BP Pulse Resp Temp SpO2   12/23/19 2200 12/23/19 2159 12/23/19 2159 12/23/19 2159 12/23/19 2159   (!) 173/93 (!) 116 16 (!) 100.5 °F (38.1 °C) 98 %      MAP       --                Physical Exam    Nursing note and vitals reviewed.  Constitutional: Vital signs are normal. He appears well-developed and well-nourished. He is not diaphoretic. He is cooperative.  Non-toxic appearance. He does not have a sickly appearance. He does not appear ill. No distress.   HENT:   Head: Normocephalic and atraumatic. Head is without abrasion, without right periorbital erythema and without left periorbital erythema.   Right Ear: Hearing and external ear normal. No drainage or tenderness.   Left Ear: Hearing and external ear normal. No drainage or tenderness.   Nose: No rhinorrhea, sinus tenderness or nasal septal hematoma. No epistaxis.  No foreign bodies. Right sinus exhibits no frontal sinus tenderness. Left sinus exhibits no frontal sinus tenderness.   Mouth/Throat: Uvula is midline and mucous membranes are normal. No oral lesions. No trismus in the jaw. No dental abscesses or uvula swelling. No oropharyngeal exudate, posterior oropharyngeal edema, posterior oropharyngeal erythema or tonsillar abscesses.   Eyes: Lids are normal. Pupils are equal, round, and  reactive to light. Right eye exhibits no chemosis, no discharge and no exudate. Left eye exhibits no chemosis, no discharge and no exudate. Right conjunctiva is not injected. Right conjunctiva has no hemorrhage. Left conjunctiva is not injected. Left conjunctiva has no hemorrhage. No scleral icterus. Right eye exhibits normal extraocular motion. Left eye exhibits normal extraocular motion.   Neck: Trachea normal and normal range of motion. Neck supple. No stridor present. No spinous process tenderness and no muscular tenderness present. No tracheal deviation and no edema present. No neck rigidity. No JVD present.   Cardiovascular: Normal pulses. Tachycardia present.    Pulmonary/Chest: Breath sounds normal.   Abdominal: Soft. Bowel sounds are normal. He exhibits no distension, no ascites and no mass. There is no hepatosplenomegaly. There is no tenderness. There is no rigidity, no rebound, no guarding and no CVA tenderness. Hernia confirmed negative in the ventral area.   Musculoskeletal: Normal range of motion.   Lymphadenopathy:     He has no cervical adenopathy.   Neurological: He is alert. He has normal strength. He is disoriented. No cranial nerve deficit or sensory deficit. GCS eye subscore is 4. GCS verbal subscore is 5. GCS motor subscore is 6.   Skin: Skin is warm. Capillary refill takes less than 2 seconds. No ecchymosis, no lesion and no rash noted. No erythema.   Psychiatric: He has a normal mood and affect. His speech is normal and behavior is normal. Judgment and thought content normal.         ED Course   Procedures  Labs Reviewed   CBC W/ AUTO DIFFERENTIAL - Abnormal; Notable for the following components:       Result Value    RBC 4.35 (*)     Hemoglobin 13.6 (*)     Mean Corpuscular Hemoglobin 31.3 (*)     Gran # (ANC) 9.6 (*)     Immature Grans (Abs) 0.05 (*)     Mono # 1.1 (*)     Gran% 79.1 (*)     Lymph% 9.1 (*)     All other components within normal limits   COMPREHENSIVE METABOLIC PANEL -  Abnormal; Notable for the following components:    Glucose 113 (*)     All other components within normal limits   URINALYSIS, REFLEX TO URINE CULTURE - Abnormal; Notable for the following components:    Ketones, UA Trace (*)     All other components within normal limits    Narrative:     Preferred Collection Type->Urine, Catheterized  Specimen Source->Urine   CULTURE, BLOOD    Narrative:     Aerobic and anaerobic   CULTURE, BLOOD    Narrative:     Aerobic and anaerobic   INFLUENZA A AND B ANTIGEN    Narrative:     Specimen Source->Nasopharyngeal Swab   ISTAT LACTATE        ECG Results          EKG 12-lead (Final result)  Result time 12/24/19 09:39:00    Final result by Interface, Lab In [facility] (12/24/19 09:39:00)                 Narrative:    Test Reason : R41.82,    Vent. Rate : 105 BPM     Atrial Rate : 105 BPM     P-R Int : 170 ms          QRS Dur : 080 ms      QT Int : 338 ms       P-R-T Axes : 025 -07 009 degrees     QTc Int : 446 ms    Sinus tachycardia  Possible Left atrial enlargement  Borderline Abnormal ECG  No previous ECGs available  Confirmed by Cory Gatica MD ([id number]) on 12/24/2019 9:38:49 AM    Referred By: [email or web address]   SELF           Confirmed By:Cory Gatica MD                            Imaging Results          X-Ray Chest AP Portable (Final result)  Result time 12/24/19 07:23:24    Final result by Evie Fonseca MD (12/24/19 07:23:24)                 Narrative:    PROCEDURE:   XR CHEST AP PORTABLE  dated  12/23/2019 10:45 PM    CLINICAL HISTORY:   Male 74 years of age.   Sepsis    TECHNIQUE: AP view of the chest obtained portably at 10:45 PM.    PREVIOUS STUDIES:  None Available    FINDINGS:    The heart is not enlarged. Chest is rotated mildly to the left,  accounting for what otherwise would be right paratracheal prominence.  Left hemidiaphragm is mildly elevated. There is mild atelectasis of  the lung bases. There is no acute pulmonary infiltrate. There is no  pleural effusion or  pneumothorax. Bones are unremarkable.    IMPRESSION:    No acute findings. Mild bilateral basilar atelectasis.    Electronically Signed by Evie Fonseca on 12/24/2019 8:00 AM                                                 ED Course as of Dec 24 1551   Tue Dec 24, 2019   0038 Pt is back at baseline per family that is bedside. Pt urine does not show any evidence of infection and cxr does not show obvious infiltrate. Will dc pt back home to nursing home.     [MP]      ED Course User Index  [MP] Mo Diaz MD                Clinical Impression:       ICD-10-CM ICD-9-CM   1. Viral syndrome B34.9 079.99   2. AMS (altered mental status) R41.82 780.97   3. Accident due to mechanical fall without injury, initial encounter W19.XXXA E888.9                             Mo Diaz MD  12/24/19 0812

## 2019-12-24 NOTE — DISCHARGE INSTRUCTIONS
YOUR WORKUP HERE WAS NEGATIVE FOR THE FLU, YOUR INITIAL READ OF YOUR CHEST XRAY DID NOT SHOW EVIDENCE OF A PNEUMONIA AND YOUR BLOOD DID NOT SHOW AN OBVIOUS INFECTION.  IF YOUR SYMPTOMS WORSEN DESPITE TREATMENT PLEASE COME BACK TO THE ER FOR REPEAT EVALUATION.

## 2019-12-26 ENCOUNTER — OFFICE VISIT (OUTPATIENT)
Dept: FAMILY MEDICINE | Facility: CLINIC | Age: 74
End: 2019-12-26
Payer: MEDICARE

## 2019-12-26 VITALS
TEMPERATURE: 98 F | BODY MASS INDEX: 27.42 KG/M2 | HEIGHT: 67 IN | OXYGEN SATURATION: 95 % | HEART RATE: 78 BPM | DIASTOLIC BLOOD PRESSURE: 70 MMHG | WEIGHT: 174.69 LBS | SYSTOLIC BLOOD PRESSURE: 112 MMHG

## 2019-12-26 DIAGNOSIS — J20.8 ACUTE BRONCHITIS DUE TO OTHER SPECIFIED ORGANISMS: ICD-10-CM

## 2019-12-26 DIAGNOSIS — I10 ESSENTIAL HYPERTENSION: ICD-10-CM

## 2019-12-26 DIAGNOSIS — E55.9 VITAMIN D DEFICIENCY: ICD-10-CM

## 2019-12-26 DIAGNOSIS — E78.00 HYPERCHOLESTEREMIA: Primary | ICD-10-CM

## 2019-12-26 PROCEDURE — 1125F PR PAIN SEVERITY QUANTIFIED, PAIN PRESENT: ICD-10-PCS | Mod: ,,, | Performed by: FAMILY MEDICINE

## 2019-12-26 PROCEDURE — 99213 PR OFFICE/OUTPT VISIT, EST, LEVL III, 20-29 MIN: ICD-10-PCS | Mod: S$PBB,,, | Performed by: FAMILY MEDICINE

## 2019-12-26 PROCEDURE — 1159F PR MEDICATION LIST DOCUMENTED IN MEDICAL RECORD: ICD-10-PCS | Mod: ,,, | Performed by: FAMILY MEDICINE

## 2019-12-26 PROCEDURE — 99214 OFFICE O/P EST MOD 30 MIN: CPT | Performed by: FAMILY MEDICINE

## 2019-12-26 PROCEDURE — 1159F MED LIST DOCD IN RCRD: CPT | Mod: ,,, | Performed by: FAMILY MEDICINE

## 2019-12-26 PROCEDURE — 1125F AMNT PAIN NOTED PAIN PRSNT: CPT | Mod: ,,, | Performed by: FAMILY MEDICINE

## 2019-12-26 PROCEDURE — 99213 OFFICE O/P EST LOW 20 MIN: CPT | Mod: S$PBB,,, | Performed by: FAMILY MEDICINE

## 2019-12-26 RX ORDER — PROMETHAZINE HYDROCHLORIDE AND DEXTROMETHORPHAN HYDROBROMIDE 6.25; 15 MG/5ML; MG/5ML
10 SYRUP ORAL NIGHTLY
Qty: 180 ML | Refills: 2 | Status: SHIPPED | OUTPATIENT
Start: 2019-12-26 | End: 2020-01-05

## 2019-12-26 RX ORDER — AZITHROMYCIN 250 MG/1
250 TABLET, FILM COATED ORAL DAILY
Qty: 6 TABLET | Refills: 0 | Status: SHIPPED | OUTPATIENT
Start: 2019-12-26 | End: 2020-01-01

## 2019-12-26 NOTE — PROGRESS NOTES
Subjective:       Patient ID: Amilcar Phillip is a 74 y.o. male.    Chief Complaint: Fever (x 4 days 102 ) and Cough (patient c/o hurts to take deep breathe patient in [facility] ER on 12/23 see notes and labs urinalysis )      Came sick 4 days ago with fever to 100.5 was seen in the emergency room on the 23rd, had blood tests and chest x-ray, was discharged instructed to take Tylenol for the fever, he had a negative chest x-ray and flu test at that time.  He has continued to have fever subjectively and continued cough.    Fever    This is a new problem. Episode onset: 4 dd. The problem occurs constantly. The problem has been unchanged. The maximum temperature noted was 100 to 100.9 F. The temperature was taken using an oral thermometer. Associated symptoms include coughing. Pertinent negatives include no diarrhea or vomiting.   Cough   Associated symptoms include a fever.       Allergies and Medications:   Review of patient's allergies indicates:  No Known Allergies  Current Outpatient Medications   Medication Sig Dispense Refill    acetaminophen (TYLENOL) 650 MG TbSR Take 1 tablet (650 mg total) by mouth every 8 (eight) hours. PRN FEVER 40 tablet 0    cholecalciferol, vitamin D3, 5,000 unit TbDL Take 1 tablet by mouth once daily.      multivit-min/FA/lycopen/lutein (CENTRUM SILVER MEN ORAL) Take 1 tablet by mouth once daily.      pravastatin (PRAVACHOL) 10 MG tablet Take 10 mg by mouth once daily.      valsartan (DIOVAN) 40 MG tablet Take 1 tablet (40 mg total) by mouth once daily. 90 tablet 1    azithromycin (Z-MABLE) 250 MG tablet Take 1 tablet (250 mg total) by mouth once daily. po on day 1 then 1 tab po on days 2-5 for 6 doses 6 tablet 0    promethazine-dextromethorphan (PROMETHAZINE-DM) 6.25-15 mg/5 mL Syrp Take 10 mLs by mouth every evening. for 10 days 180 mL 2    varicella-zoster gE-AS01B, PF, (SHINGRIX, PF,) 50 mcg/0.5 mL injection Inject 0.5 mLs into the muscle once.       No current  facility-administered medications for this visit.        Family History:   Family History   Problem Relation Age of Onset    Osteoporosis Mother     Vaginal cancer Mother     Stroke Father        Social History:   Social History     Socioeconomic History    Marital status:      Spouse name: Not on file    Number of children: Not on file    Years of education: Not on file    Highest education level: Not on file   Occupational History    Occupation: retired   Social Needs    Financial resource strain: Not on file    Food insecurity:     Worry: Not on file     Inability: Not on file    Transportation needs:     Medical: Not on file     Non-medical: Not on file   Tobacco Use    Smoking status: Former Smoker     Types: Cigarettes, Cigars     Last attempt to quit: 2009     Years since quitting: 10.9    Smokeless tobacco: Never Used   Substance and Sexual Activity    Alcohol use: Yes     Frequency: 4 or more times a week     Drinks per session: 1 or 2    Drug use: Never    Sexual activity: Not Currently   Lifestyle    Physical activity:     Days per week: Not on file     Minutes per session: Not on file    Stress: Not at all   Relationships    Social connections:     Talks on phone: Not on file     Gets together: Not on file     Attends Buddhist service: Not on file     Active member of club or organization: Not on file     Attends meetings of clubs or organizations: Not on file     Relationship status: Not on file   Other Topics Concern    Not on file   Social History Narrative    Live in assistive living center       Review of Systems   Constitutional: Positive for fever.   Respiratory: Positive for cough.    Gastrointestinal: Negative for diarrhea and vomiting.       Objective:     Vitals:    12/26/19 1006   BP: 112/70   Pulse: 78   Temp: 97.7 °F (36.5 °C)        Physical Exam   Constitutional: He appears well-developed and well-nourished. No distress.   HENT:   Head: Normocephalic and  atraumatic.   Right Ear: Hearing, tympanic membrane, external ear and ear canal normal. No drainage, swelling or tenderness. No foreign bodies. Tympanic membrane is not injected, not scarred, not perforated, not erythematous, not retracted and not bulging. Tympanic membrane mobility is normal. No middle ear effusion. No hemotympanum. No decreased hearing is noted.   Left Ear: Hearing, tympanic membrane, external ear and ear canal normal. No drainage, swelling or tenderness. No foreign bodies. Tympanic membrane is not injected, not scarred, not perforated, not erythematous, not retracted and not bulging. Tympanic membrane mobility is normal.  No middle ear effusion. No hemotympanum. No decreased hearing is noted.   Nose: Nose normal. No mucosal edema, rhinorrhea, nose lacerations, sinus tenderness, nasal deformity or septal deviation. Right sinus exhibits no maxillary sinus tenderness and no frontal sinus tenderness. Left sinus exhibits no maxillary sinus tenderness and no frontal sinus tenderness.   Mouth/Throat: Uvula is midline and oropharynx is clear and moist. Normal dentition. No oropharyngeal exudate, posterior oropharyngeal edema, posterior oropharyngeal erythema or tonsillar abscesses.   Eyes: Pupils are equal, round, and reactive to light. Conjunctivae and EOM are normal. Right eye exhibits no discharge. Left eye exhibits no discharge. No scleral icterus.   Neck: Normal range of motion. Neck supple. No thyromegaly present.   Cardiovascular: Normal rate, regular rhythm, normal heart sounds and intact distal pulses. Exam reveals no gallop and no friction rub.   No murmur heard.  Pulmonary/Chest: Effort normal and breath sounds normal. No stridor. No respiratory distress. He has no wheezes. He has no rales. He exhibits no tenderness.   Lymphadenopathy:     He has no cervical adenopathy.   Skin: He is not diaphoretic.   Nursing note and vitals reviewed.      Assessment:       1. Hypercholesteremia    2.  Essential hypertension    3. Vitamin D deficiency    4. Acute bronchitis due to other specified organisms        Plan:       Amilcar was seen today for fever and cough.    Diagnoses and all orders for this visit:    Hypercholesteremia    Essential hypertension    Vitamin D deficiency    Acute bronchitis due to other specified organisms  -     promethazine-dextromethorphan (PROMETHAZINE-DM) 6.25-15 mg/5 mL Syrp; Take 10 mLs by mouth every evening. for 10 days  -     azithromycin (Z-MABLE) 250 MG tablet; Take 1 tablet (250 mg total) by mouth once daily. po on day 1 then 1 tab po on days 2-5 for 6 doses         Follow up in about 6 months (around 6/26/2020), or if symptoms worsen or fail to improve.

## 2019-12-29 LAB
BACTERIA BLD CULT: NORMAL
BACTERIA BLD CULT: NORMAL

## 2020-01-16 ENCOUNTER — LAB VISIT (OUTPATIENT)
Dept: LAB | Facility: HOSPITAL | Age: 75
End: 2020-01-16
Attending: PODIATRIST
Payer: MEDICARE

## 2020-01-16 ENCOUNTER — OFFICE VISIT (OUTPATIENT)
Dept: PODIATRY | Facility: CLINIC | Age: 75
End: 2020-01-16
Payer: MEDICARE

## 2020-01-16 VITALS
DIASTOLIC BLOOD PRESSURE: 69 MMHG | HEART RATE: 85 BPM | BODY MASS INDEX: 27.15 KG/M2 | WEIGHT: 173 LBS | SYSTOLIC BLOOD PRESSURE: 105 MMHG | HEIGHT: 67 IN

## 2020-01-16 DIAGNOSIS — M10.9 ACUTE GOUT OF LEFT FOOT, UNSPECIFIED CAUSE: ICD-10-CM

## 2020-01-16 DIAGNOSIS — M79.672 LEFT FOOT PAIN: ICD-10-CM

## 2020-01-16 DIAGNOSIS — M10.9 ACUTE GOUT OF LEFT FOOT, UNSPECIFIED CAUSE: Primary | ICD-10-CM

## 2020-01-16 LAB — URATE SERPL-MCNC: 6.2 MG/DL (ref 3.4–7)

## 2020-01-16 PROCEDURE — 1159F PR MEDICATION LIST DOCUMENTED IN MEDICAL RECORD: ICD-10-PCS | Mod: ,,, | Performed by: PODIATRIST

## 2020-01-16 PROCEDURE — 99203 PR OFFICE/OUTPT VISIT, NEW, LEVL III, 30-44 MIN: ICD-10-PCS | Mod: S$PBB,,, | Performed by: PODIATRIST

## 2020-01-16 PROCEDURE — 1125F PR PAIN SEVERITY QUANTIFIED, PAIN PRESENT: ICD-10-PCS | Mod: ,,, | Performed by: PODIATRIST

## 2020-01-16 PROCEDURE — 1125F AMNT PAIN NOTED PAIN PRSNT: CPT | Mod: ,,, | Performed by: PODIATRIST

## 2020-01-16 PROCEDURE — 99213 OFFICE O/P EST LOW 20 MIN: CPT | Performed by: PODIATRIST

## 2020-01-16 PROCEDURE — 1159F MED LIST DOCD IN RCRD: CPT | Mod: ,,, | Performed by: PODIATRIST

## 2020-01-16 PROCEDURE — 84550 ASSAY OF BLOOD/URIC ACID: CPT

## 2020-01-16 PROCEDURE — 99203 OFFICE O/P NEW LOW 30 MIN: CPT | Mod: S$PBB,,, | Performed by: PODIATRIST

## 2020-01-16 PROCEDURE — 36415 COLL VENOUS BLD VENIPUNCTURE: CPT

## 2020-01-16 RX ORDER — INDOMETHACIN 50 MG/1
50 CAPSULE ORAL 2 TIMES DAILY WITH MEALS
Qty: 90 CAPSULE | Refills: 0 | Status: SHIPPED | OUTPATIENT
Start: 2020-01-16 | End: 2020-04-29

## 2020-01-16 NOTE — PROGRESS NOTES
[address] [facility] [address] [place] [address]  Phone: [phone]   [phone]    Patient's PCP:Angel Edwards Jr, MD  Referring Provider: No ref. provider found    Subjective:      Chief Complaint:: Foot Pain (left first toe, inflamation swelling) and Gout (left first.)    NEL Phillip is a 74 y.o. male who presents with a complaint of left foot pain and gout attack to left first toe lasting for four days. Onset of the symptoms was pain and inflammation after drinking beer Monday evening. Patient has previous history of gout attacks but has not had an episode in over three years.  Current symptoms include pain, swelling, inflammation, sensitivity to touch.  Aggravating factors are weight bearing, walking, applied pressure, shoes and socks. Symptoms have remained.Treatment to date have included rest and Tylenol. Patients rates pain 5/10 on pain scale.    Vitals:    01/16/20 1426   BP: 105/69   Pulse: 85     Shoe Size: 8 W    Past Surgical History:   Procedure Laterality Date    COLONOSCOPY      HERNIA REPAIR       Past Medical History:   Diagnosis Date    Hypercholesteremia     Hypertension     Vitamin D deficiency      Family History   Problem Relation Age of Onset    Osteoporosis Mother     Vaginal cancer Mother     Stroke Father         Social History:   Marital Status:   Alcohol History:  reports that he drinks alcohol.  Tobacco History:  reports that he quit smoking about 11 years ago. His smoking use included cigarettes and cigars. He has never used smokeless tobacco.  Drug History:  reports that he does not use drugs.    Review of patient's allergies indicates:  No Known Allergies    Current Outpatient Medications   Medication Sig Dispense Refill    acetaminophen (TYLENOL) 650 MG TbSR Take 1 tablet (650 mg total) by mouth every 8 (eight) hours. PRN FEVER 40 tablet 0    cholecalciferol, vitamin D3, 5,000 unit TbDL Take 1 tablet by mouth once daily.      indomethacin  (INDOCIN) 50 MG capsule Take 1 capsule (50 mg total) by mouth 2 (two) times daily with meals. 90 capsule 0    multivit-min/FA/lycopen/lutein (CENTRUM SILVER MEN ORAL) Take 1 tablet by mouth once daily.      pravastatin (PRAVACHOL) 10 MG tablet Take 10 mg by mouth once daily.      valsartan (DIOVAN) 40 MG tablet Take 1 tablet (40 mg total) by mouth once daily. 90 tablet 1    varicella-zoster gE-AS01B, PF, (SHINGRIX, PF,) 50 mcg/0.5 mL injection Inject 0.5 mLs into the muscle once.       No current facility-administered medications for this visit.        Review of Systems      Objective:        Physical Exam:   Foot Exam    General  General Appearance: appears stated age and healthy   Orientation: alert and oriented to person, place, and time   Affect: appropriate       Left Foot/Ankle      Inspection and Palpation  Ecchymosis: none  Tenderness: great toe metatarsophalangeal joint   Swelling: great toe metatarsophalangeal joint   Arch: normal  Skin Exam: erythema;     Neurovascular  Dorsalis pedis: 2+  Posterior tibial: 2+  Saphenous nerve sensation: normal  Tibial nerve sensation: normal  Superficial peroneal nerve sensation: normal  Deep peroneal nerve sensation: normal  Sural nerve sensation: normal    Muscle Strength  Ankle dorsiflexion: 5  Ankle plantar flexion: 5  Ankle inversion: 5  Ankle eversion: 5  Great toe extension: 5  Great toe flexion: 5    Range of Motion    Passive  1st MTP extension: pain  1st MTP flexion: pain    Active  1st MTP extension: pain  1st MTP flexion: pain    Comments  Edema erythema and warmth is noted to the 1st metatarsophalangeal joint.  It is tender to palpation.    Physical Exam   Cardiovascular:   Pulses:       Dorsalis pedis pulses are 2+ on the left side.        Posterior tibial pulses are 2+ on the left side.   Feet:   Left Foot:   Skin Integrity: Positive for erythema.       Imaging: none            Assessment:       1. Acute gout of left foot, unspecified cause    2. Left  foot pain      Plan:   Acute gout of left foot, unspecified cause  -     indomethacin (INDOCIN) 50 MG capsule; Take 1 capsule (50 mg total) by mouth 2 (two) times daily with meals.  Dispense: 90 capsule; Refill: 0  -     Uric acid; Future; Expected date: 01/16/2020    Left foot pain  -     indomethacin (INDOCIN) 50 MG capsule; Take 1 capsule (50 mg total) by mouth 2 (two) times daily with meals.  Dispense: 90 capsule; Refill: 0  -     Uric acid; Future; Expected date: 01/16/2020      Follow up if symptoms worsen or fail to improve.    Procedures - None    Patient instructed to take indomethacin 3 times daily until symptoms begin to improve then twice daily until symptoms resolve.  If symptoms do not resolve in 1 week patient is to call for follow-up.    Uric acid level ordered    Counseling:     I provided patient education verbally regarding:   Patient diagnosis, treatment options, as well as alternatives, risks, and benefits.      causes of gout, diet for gout and oral medication for gout.  I discussed lowering the uric acid level to below 6 and as close to 5.5 to stop uric acid crystal formation.    This note was created using Dragon voice recognition software that occasionally misinterpreted phrases or words.

## 2020-01-16 NOTE — PATIENT INSTRUCTIONS
Gout    Gout is an inflammation of a joint due to a build-up of gout crystals in the joint fluid. This occurs when there is an excess of uric acid (a normal waste product) in the body. Uric acid builds up in the body when the kidneys are unable to filter enough of it from the blood. This may occur with age. It is also associated with kidney disease. Gout occurs more often in people with obesity, diabetes, high blood pressure, or high levels of fats in the blood. It may run in families. Gout tends to come and go. A flare up of gout is called an attack. Drinking alcohol or eating certain foods (such as shellfish or foods with additives such as high-fructose corn syrup) may increase uric acid levels in the blood and cause a gout attack.  During a gout attack, the affected joint may become a hot, red, swollen and painful. If you have had one attack of gout, you are likely to have another. An attack of gout can be treated with medicine. If these attacks become frequent, a daily medicine may be prescribed to help the kidneys remove uric acid from the body.  Home care  During a gout attack:  · Rest painful joints. If gout affects the joints of your foot or leg, you may want to use crutches for the first few days to keep from bearing weight on the affected joint.  · When sitting or lying down, raise the painful joint to a level higher than your heart.  · Apply an ice pack (ice cubes in a plastic bag wrapped in a thin towel) over the injured area for 20 minutes every 1 to 2 hours the first day for pain relief. Continue this 3 to 4 times a day for swelling and pain.  · Avoid alcohol and foods listed below (see Preventing attacks) during a gout attack. Drink extra fluid to help flush the uric acid through your kidneys.  · If you were prescribed a medicine to treat gout, take it as your healthcare provider has instructed. Don't skip doses.  · Take anti-inflammatory medicine as directed.   · If pain medicines have been  prescribed, take them exactly as directed.    Preventing attacks  · Minimize or avoid alcohol use. Excess alcohol intake can cause a gout attack.  · Limit these foods and beverages:  ¨ Organ meats, such as kidneys and liver  ¨ Certain seafoods (anchovies, sardines, shrimp, scallops, herring, mackerel)  ¨ Wild game, meat extracts and meat gravies  ¨ Foods and beverages sweetened with high-fructose corn syrup, such as sodas  · Eat a healthy diet including low-fat and nonfat dairy, whole grains, and vegetables.  · If you are overweight, talk to your healthcare provider about a weight reduction plan. Avoid fasting or extreme low calorie diets (less than 900 calories per day). This will increase uric acid levels in the body.  · If you have diabetes or high blood pressure, work with your doctor to manage these conditions.  · Protect the joint from injury. Trauma can trigger a gout attack.  Follow-up care  Follow up with your healthcare provider, or as advised.   When to seek medical advice  Call your healthcare provider if you have any of the following:  · Fever over 100.4°F (38.ºC) with worsening joint pain  · Increasing redness around the joint  · Pain developing in another joint  · Repeated vomiting, abdominal pain, or blood in the vomit or stool (black or red color)  Date Last Reviewed: 3/1/2017  © 5830-2115 [employer]. [address] [place] [address], [place] [address]. All rights reserved. This information is not intended as a substitute for professional medical care. Always follow your healthcare professional's instructions.        Treating Gout Attacks     Raising the joint above the level of your heart can help reduce gout symptoms.     Gout is a disease that affects the joints. It is caused by excess uric acid in your blood stream that may lead to crystals forming in your joints. Left untreated, it can lead to painful foot and joint deformities and even kidney problems. But, by treating gout early, you can  relieve pain and help prevent future problems. Gout can usually be treated with medication and proper diet. In severe cases, surgery may be needed.  Gout attacks are painful and often happen more than once. Taking medications may reduce pain and prevent attacks in the future. There are also some things you can do at home to relieve symptoms.  Medications for gout  Your healthcare provider may prescribe a daily medication to reduce levels of uric acid. Reducing your uric acid levels may help prevent gout attacks. Allopurinol is one commonly used medication taken daily to reduce uric acid levels. Other medications can help relieve pain and swelling during an acute attack. Medicines such as NSAIDs (nonsteroidal anti-inflammatory medicines), steroids, and colchicine may be prescribed for intermittent use to relieve an acute gout attack. Be sure to take your medication as directed.  What you can do  Below are some things you can do at home to relieve gout symptoms. Your healthcare provider may have other tips.  · Rest the painful joint as much as you can.  · Raise the painful joint so it is at a level higher than your heart.  · Use ice for 10 minutes every 1-2 hours as possible.  How can I prevent gout?  With a little effort, you may be able to prevent gout attacks in the future. Here are some things you can do:  · Avoid foods high in purines  ¨ Certain meats (red meat, processed meat, turkey)  ¨ Organ meats (kidney, liver, sweetbread)  ¨ Shellfish (lobster, crab, shrimp, scallop, mussel)  ¨ Certain fish (anchovy, sardine, herring, mackerel)  · Take any medications prescribed by your healthcare provider.  · Lose weight if you need to.  · Reduce high fructose corn syrup in meals and drinks.  · Reduce or eliminate consumption of alcohol, particularly beer, but also red wine and spirits.  · Control blood pressure, diabetes, and cholesterol.  · Drink plenty of water to help flush uric acid from your body.  Date Last  Reviewed: 2/1/2016 © 2000-2017 [employer]. [address] [place] [address], [place] [address]. All rights reserved. This information is not intended as a substitute for professional medical care. Always follow your healthcare professional's instructions.        Eating to Prevent Gout  Gout is a painful form of arthritis caused by an excess of uric acid. This is a waste product made by the body. It builds up in the body and forms crystals that collect in the joints, bringing on a gout attack. Alcohol and certain foods can trigger a gout attack. Below are some guidelines for changing your diet to help you manage gout. Your healthcare provider can work with you to determine the best eating plan for you. Know that diet is only one part of managing gout. Take your medicines as prescribed and follow the other guidelines your healthcare provider has given you.  Foods to limit  Eating too many foods containing purines may increase the levels of uric acid in your body and increase your risk for a gout attack. It may be best to limit these high-purine foods:  · Alcohol (beer, red wine). You may be told to avoid alcohol completely.  · Certain fish (anchovies, sardines, fish roes, herring, tuna, mussels, codfish, scallops, trout, and peggy)  · Certain meats (red meat, processed meat, taylor, turkey, wild game, and goose)  · Sauces and gravies made with meat  · Organ meats (such as liver, kidneys, sweetbreads, and tripe)  · Legumes (such as dried beans, peas)  · Mushrooms, spinach, asparagus, and cauliflower  · Yeast and yeast extract supplements  Foods to try  Some foods may be helpful for people with gout. You may want to try adding some of the following foods to your diet:  · Dark berries: These include blueberries, blackberries, and cherries. These berries contain chemicals that may lower uric acid.  · Tofu: Tofu, which is made from soy, is a good source of protein. Studies have shown that it may be a better choice  than meat for people with gout.  · Omega fatty acids: These acids are found in fatty fish (such as salmon), certain oils (such as flax, olive, or nut oils), or nuts. They may help prevent inflammation due to gout.  The following guidelines are recommended by the American Medical Association for people with gout. Your diet should be:  · High in fiber, whole grains, fruits, and vegetables.  · Low in protein (15% of calories should come from protein. Choose lean sources such as soy, lean meats, and poultry).  · Low in fat (no more than 30% of calories should come from fat, with only 10% coming from animal fat).   Date Last Reviewed: 6/17/2015 © 2000-2017 [employer]. [address] [place] [address], [place] [address]. All rights reserved. This information is not intended as a substitute for professional medical care. Always follow your healthcare professional's instructions.        Gout Diet  Gout is a painful condition caused by an excess of uric acid, a waste product made by the body. Uric acid forms crystals that collect in the joints. The immune response to these crystals brings on symptoms of joint pain and swelling. This is called a gout attack. Often, medications and diet changes are combined to manage gout. Below are some guidelines for changing your diet to help you manage gout and prevent attacks. Your health care provider will help you determine the best eating plan for you.     Eating to manage gout  Weight loss for those who are overweight may help reduce gout attacks.  Eat less of these foods  Eating too many foods containing purines may raise the levels of uric acid in your body. This raises your risk for a gout attack. Try to limit these foods and drinks:  · Alcohol, such as beer and red wine. You may be told to avoid alcohol completely.  · Soft drinks that contain sugar or high fructose corn syrup  · Certain fish, including anchovies, sardines, fish eggs, and herring  · Shellfish  · Certain  meats, such as red meat, hot dogs, luncheon meats, and turkey  · Organ meats, such as liver, kidneys, and sweetbreads  · Legumes, such as dried beans and peas  · Other high fat foods such as gravy, whole milk, and high fat cheeses  · Vegetables such as asparagus, cauliflower, spinach, and mushrooms used to be thought to contribute to an increased risk for a gout attack, but recent studies show that high purine vegetables don't increase the risk for a gout attack.  Eat more of these foods  Other foods may be helpful for people with gout. Add some of these foods to your diet:  · Cherries contain chemicals that may lower uric acid.  · Omega fatty acids. These are found in some fatty fish such as salmon, certain oils (flax, olive, or nut), and nuts themselves. Omega fatty acids may help prevent inflammation due to gout.  · Dairy products that are low-fat or fat-free, such as cheese and yogurt  · Complex carbohydrate foods, including whole grains, brown rice, oats, and beans  · Coffee, in moderation  · Water, approximately 64 ounces per day  Follow-up care  Follow up with your healthcare provider as advised.  When to seek medical advice  Call your healthcare provider right away if any of these occur:  · Return of gout symptoms, usually at night:  · Severe pain, swelling, and heat in a joint, especially the base of the big toe  · Affected joint is hard to move  · Skin of the affected joint is purple or red  · Fever of 100.4°F (38°C) or higher  · Pain that doesn't get better even with prescribed medicine   Date Last Reviewed: 1/12/2016 © 2000-2017 [employer]. [address] [place] [address]. All rights reserved. This information is not intended as a substitute for professional medical care. Always follow your healthcare professional's instructions.        Uric Acid (Blood)  Does this test have other names?  Serum uric acid  What is this test?  This test measures the amount of uric acid in your  blood.  Uric acid is a normal bodily waste product. It forms when chemicals called purines break down. Purines are a natural substance found in the body and are also found in many foods such as liver, shellfish, and alcohol. They can also be formed in the body when DNA is broken down.   When purines are broken down to uric acid in the blood, the body gets rid of it when you urinate or have a bowel movement. But if your body makes too much uric acid, or if your kidneys aren't working properly, uric acid can build up in the blood. Uric acid levels can also increase when you eat too many high-purine foods or take certain medicines like diuretics, aspirin, and niacin. Then crystals of uric acid can form and collect in the joints, causing painful inflammation. This condition is called gout.  Why do I need this test?  You might need this test if your healthcare provider wants to see whether you have high levels of uric acid in your blood. Your provider may recommend this test if you have symptoms of gout, although most people with hyperuricemia don't develop gout. Symptoms of gout include:  · Joint pain or tenderness  · Swelling in a joint or reddened skin around a joint  · Swelling and pain in the big toe, ankle, or knee  · Joints that are hot to the touch  · Swelling and pain that affects only one joint in the body  · Skin that looks shiny and is red or purple  You may also need this test if you have symptoms of kidney stones. Symptoms include:  · Severe pain along your lower back. This may repeatedly get worse and then ease up. The pain may also travel to your genitals.  · Nausea  · Vomiting  · Urgent need to urinate  · Blood in your urine  What other tests might I have along with this test?  Your healthcare provider may also order other tests to diagnose gout, include looking at a sample of joint fluid drawn out with a needle.  Your provider may also order a urinalysis if he or she suspects that you have a kidney  stone. The urinalysis looks for blood, white blood cells, and crystals.  Your provider may also order tests of your blood and urine to find out what's causing the high levels uric acid.  What do my test results mean?  Many things may affect your lab test results. These include the method each lab uses to do the test. Even if your test results are different from the normal value, you may not have a problem. To learn what the results mean for you, talk with your healthcare provider.  Results are given in milligrams per deciliter (mg/dL). Here are results that may mean you have hyperuricemia:  · For females: higher than 6 mg/dL  · For males: higher than 7 mg/dL  Many health conditions can cause high levels of uric acid. These include cancer, kidney disease, hypothyroidism, hyperparathyroidism, and sarcoidosis.  Your uric acid levels may be high if you eat foods high in purines, such as organ meats, dried beans and peas, and certain fish - anchovies, herring, sardines, and mackerel. High levels can also be caused by a low-salt diet.  How is this test done?  The test requires a blood sample, which is drawn through a needle from a vein in your arm.  Does this test pose any risks?  Taking a blood sample with a needle carries risks that include bleeding, infection, bruising, or feeling dizzy. When the needle pricks your arm, you may feel a slight stinging sensation or pain. Afterward, the site may be slightly sore.  What might affect my test results?  Certain medicines may affect your test results. These include:  · Aspirin and other medicines that contain salicylate  · Cyclosporine, a medicine sometimes used for autoimmune diseases  · Levodopa, a medicine used to treat Parkinson disease  · Certain diuretic medicines such as hydrochlorothiazide  · Vitamin B-3 (niacin)  Other things that may affect your test results include:  · Vigorous exercise  · Chemotherapy or radiation therapy to treat cancer  · Foods high in purines,  including organ meats, mushrooms, some types of fish and seafood, and dried peas and beans   How do I get ready for this test?  Ask your healthcare provider if you should avoid any foods, beverages, or medications before the test.  Be sure your provider knows about all medicines, herbs, vitamins, and supplements you are taking. This includes medicines that don't need a prescription and any illicit drugs you may use.      © 0442-6649 [employer]. [address] [place] [address], [place] [address]. All rights reserved. This information is not intended as a substitute for professional medical care. Always follow your healthcare professional's instructions.

## 2020-01-17 ENCOUNTER — TELEPHONE (OUTPATIENT)
Dept: PODIATRY | Facility: CLINIC | Age: 75
End: 2020-01-17

## 2020-01-17 NOTE — TELEPHONE ENCOUNTER
Called pt, s/w patient's sister as patient lives in an apt and does not have a phone.  Relayed information and asked that the information be passed on to patient.

## 2020-01-17 NOTE — TELEPHONE ENCOUNTER
----- Message from Dexter Gresham DPM sent at 1/16/2020  4:44 PM CST -----  Let pt know uric acid is within normal limits

## 2020-03-09 ENCOUNTER — OFFICE VISIT (OUTPATIENT)
Dept: FAMILY MEDICINE | Facility: CLINIC | Age: 75
End: 2020-03-09
Payer: MEDICARE

## 2020-03-09 VITALS
DIASTOLIC BLOOD PRESSURE: 60 MMHG | SYSTOLIC BLOOD PRESSURE: 130 MMHG | HEART RATE: 85 BPM | HEIGHT: 67 IN | WEIGHT: 170 LBS | OXYGEN SATURATION: 97 % | BODY MASS INDEX: 26.68 KG/M2 | TEMPERATURE: 98 F

## 2020-03-09 DIAGNOSIS — F02.80 DEMENTIA IN ALZHEIMER'S DISEASE: ICD-10-CM

## 2020-03-09 DIAGNOSIS — L08.9 INFECTED CYST OF SKIN: Primary | ICD-10-CM

## 2020-03-09 DIAGNOSIS — I10 ESSENTIAL HYPERTENSION: ICD-10-CM

## 2020-03-09 DIAGNOSIS — E55.9 VITAMIN D DEFICIENCY: ICD-10-CM

## 2020-03-09 DIAGNOSIS — G30.9 DEMENTIA IN ALZHEIMER'S DISEASE: ICD-10-CM

## 2020-03-09 DIAGNOSIS — R41.0 DELIRIUM: ICD-10-CM

## 2020-03-09 DIAGNOSIS — L72.9 INFECTED CYST OF SKIN: Primary | ICD-10-CM

## 2020-03-09 DIAGNOSIS — E78.00 HYPERCHOLESTEREMIA: ICD-10-CM

## 2020-03-09 DIAGNOSIS — R41.3 MEMORY IMPAIRMENT OF GRADUAL ONSET: ICD-10-CM

## 2020-03-09 PROCEDURE — 99214 PR OFFICE/OUTPT VISIT, EST, LEVL IV, 30-39 MIN: ICD-10-PCS | Mod: S$GLB,,, | Performed by: INTERNAL MEDICINE

## 2020-03-09 PROCEDURE — 99214 OFFICE O/P EST MOD 30 MIN: CPT | Mod: S$GLB,,, | Performed by: INTERNAL MEDICINE

## 2020-03-09 RX ORDER — SULFAMETHOXAZOLE AND TRIMETHOPRIM 800; 160 MG/1; MG/1
1 TABLET ORAL 2 TIMES DAILY
Qty: 20 TABLET | Refills: 0 | Status: SHIPPED | OUTPATIENT
Start: 2020-03-09 | End: 2020-04-29 | Stop reason: ALTCHOICE

## 2020-03-09 RX ORDER — PRAVASTATIN SODIUM 10 MG/1
10 TABLET ORAL DAILY
Qty: 90 TABLET | Refills: 1 | Status: SHIPPED | OUTPATIENT
Start: 2020-03-09 | End: 2020-08-17

## 2020-03-09 NOTE — PROGRESS NOTES
Dictation #1  MRN:[id number]  [facility]:[id number]  Subjective:       Patient ID: Amilcar Phillip is a 74 y.o. male.    Chief Complaint: Cyst (growth behind right ear); Hyperlipidemia; and Hypertension    Here with sister for follow up.  He lives in assisted living facility.  The staff there has reported that they have noticed increasing confusion over the last few weeks.  Wandering the halls, trying to enter rooms that aren't his.  He also has been looking for his wife and daughter, both of whom are .   Also reported to his sister that there were several girls staying in the room with him at night.  Sister reports gradual memory decline in last 6 months.    Cyst   This is a new problem. The current episode started 1 to 4 weeks ago (about 10 days). Pertinent negatives include no abdominal pain, arthralgias, chest pain, chills, congestion, coughing, fatigue, fever, headaches, joint swelling, myalgias, nausea, neck pain, numbness, rash, vomiting or weakness. Exacerbated by: tender to touch. He has tried nothing for the symptoms.   Hypertension   This is a chronic problem. The problem is controlled. Pertinent negatives include no anxiety, chest pain, headaches, malaise/fatigue, neck pain, palpitations, peripheral edema or shortness of breath. Past treatments include angiotensin blockers. The current treatment provides moderate improvement. There are no compliance problems.      Review of Systems   Constitutional: Negative for chills, fatigue, fever, malaise/fatigue and unexpected weight change.   HENT: Positive for hearing loss. Negative for congestion, postnasal drip, rhinorrhea, trouble swallowing and voice change.    Eyes: Negative for photophobia and visual disturbance.   Respiratory: Negative for apnea, cough, choking, chest tightness, shortness of breath and wheezing.    Cardiovascular: Negative for chest pain, palpitations and leg swelling.   Gastrointestinal: Negative for abdominal pain, blood in stool,  constipation, diarrhea, nausea, rectal pain and vomiting.   Endocrine: Negative for cold intolerance, heat intolerance, polydipsia and polyuria.   Genitourinary: Negative for decreased urine volume, difficulty urinating, discharge, dysuria, flank pain, frequency, genital sores, hematuria, testicular pain and urgency.   Musculoskeletal: Positive for back pain. Negative for arthralgias, gait problem, joint swelling, myalgias and neck pain.   Skin: Positive for wound. Negative for color change and rash.   Allergic/Immunologic: Negative for environmental allergies and food allergies.   Neurological: Negative for dizziness, tremors, seizures, syncope, facial asymmetry, speech difficulty, weakness, light-headedness, numbness and headaches.   Hematological: Negative for adenopathy. Does not bruise/bleed easily.   Psychiatric/Behavioral: Positive for confusion and hallucinations. Negative for sleep disturbance and suicidal ideas. The patient is not nervous/anxious.        Past Medical History:   Diagnosis Date    Hypercholesteremia     Hypertension     Vitamin D deficiency       Past Surgical History:   Procedure Laterality Date    COLONOSCOPY      HERNIA REPAIR         Family History   Problem Relation Age of Onset    Osteoporosis Mother     Vaginal cancer Mother     Stroke Father        Social History     Socioeconomic History    Marital status:      Spouse name: Not on file    Number of children: Not on file    Years of education: Not on file    Highest education level: Not on file   Occupational History    Occupation: retired   Social Needs    Financial resource strain: Not on file    Food insecurity:     Worry: Not on file     Inability: Not on file    Transportation needs:     Medical: Not on file     Non-medical: Not on file   Tobacco Use    Smoking status: Former Smoker     Types: Cigarettes, Cigars     Last attempt to quit:      Years since quittin.1    Smokeless tobacco: Never  Used   Substance and Sexual Activity    Alcohol use: Yes     Frequency: 4 or more times a week     Drinks per session: 1 or 2    Drug use: Never    Sexual activity: Not Currently   Lifestyle    Physical activity:     Days per week: Not on file     Minutes per session: Not on file    Stress: Not at all   Relationships    Social connections:     Talks on phone: Not on file     Gets together: Not on file     Attends Sikh service: Not on file     Active member of club or organization: Not on file     Attends meetings of clubs or organizations: Not on file     Relationship status: Not on file   Other Topics Concern    Not on file   Social History Narrative    Live in assistive living center       Current Outpatient Medications   Medication Sig Dispense Refill    acetaminophen (TYLENOL) 650 MG TbSR Take 1 tablet (650 mg total) by mouth every 8 (eight) hours. PRN FEVER 40 tablet 0    cholecalciferol, vitamin D3, 5,000 unit TbDL Take 1 tablet by mouth once daily.      indomethacin (INDOCIN) 50 MG capsule Take 1 capsule (50 mg total) by mouth 2 (two) times daily with meals. 90 capsule 0    multivit-min/FA/lycopen/lutein (CENTRUM SILVER MEN ORAL) Take 1 tablet by mouth once daily.      pravastatin (PRAVACHOL) 10 MG tablet Take 1 tablet (10 mg total) by mouth once daily. 90 tablet 1    valsartan (DIOVAN) 40 MG tablet Take 1 tablet (40 mg total) by mouth once daily. 90 tablet 1    sulfamethoxazole-trimethoprim 800-160mg (BACTRIM DS) 800-160 mg Tab Take 1 tablet by mouth 2 (two) times daily. 20 tablet 0    varicella-zoster [id number], PF, (SHINGRIX, PF,) 50 mcg/0.5 mL injection Inject 0.5 mLs into the muscle once.       No current facility-administered medications for this visit.        Review of patient's allergies indicates:  No Known Allergies  Objective:    HPI     Cyst      Additional comments: growth behind right ear          Last edited by Angel Edwards Jr., MD on 3/9/2020  4:56 PM. (History)     "  Blood pressure 130/60, pulse 85, temperature 98.1 °F (36.7 °C), temperature source Temporal, height 5' 7" (1.702 m), weight 77.1 kg (170 lb), SpO2 97 %. Body mass index is 26.63 kg/m².   Physical Exam   Constitutional: He appears well-developed. No distress.   HENT:   Nose: Nose normal.   Mouth/Throat: Oropharynx is clear and moist.   Eyes: Conjunctivae are normal. Right eye exhibits no discharge. Left eye exhibits no discharge. No scleral icterus.   Neck: Carotid bruit is not present.   Cardiovascular: Normal rate, regular rhythm and normal heart sounds.   No murmur heard.  Pulmonary/Chest: Effort normal and breath sounds normal. No respiratory distress. He has no decreased breath sounds. He has no wheezes. He has no rhonchi. He has no rales.   Abdominal: Soft. He exhibits no distension. There is no tenderness. There is no rebound and no guarding.   Musculoskeletal: He exhibits no edema.        Thoracic back: He exhibits deformity (kyphosis).   Neurological: He is alert. He is disoriented (oriented to person and place but unable to give date or day of the week; answers questions appropriately). He displays no tremor. Gait (ambulation with cane) abnormal.   Skin: Skin is warm and dry. Lesion (small cyst with thick caseous material expressable right side of face at the angle of the mandible; erythematous) noted.   Psychiatric: He has a normal mood and affect. His speech is normal. He is slowed.   Nursing note and vitals reviewed.          Assessment:       1. Infected cyst of skin    2. Essential hypertension    3. Hypercholesteremia    4. Vitamin D deficiency    5. Delirium    6. Memory impairment of gradual onset    7. Dementia in Alzheimer's disease        Plan:       Amilcar was seen today for cyst, hyperlipidemia and hypertension.    Diagnoses and all orders for this visit:    Infected cyst of skin  -     sulfamethoxazole-trimethoprim 800-160mg (BACTRIM DS) 800-160 mg Tab; Take 1 tablet by mouth 2 (two) times " daily.    Essential hypertension  -     Comprehensive metabolic panel; Future  -     Lipid panel; Future  -     Urinalysis, Reflex to Urine Culture Urine, Clean Catch  -     Comprehensive metabolic panel  -     Lipid panel    Hypercholesteremia  -     pravastatin (PRAVACHOL) 10 MG tablet; Take 1 tablet (10 mg total) by mouth once daily.  -     Comprehensive metabolic panel; Future  -     Lipid panel; Future  -     Comprehensive metabolic panel  -     Lipid panel    Vitamin D deficiency  -     Vitamin D; Future  -     Vitamin D    Delirium  Comments:  Likely r/t dementia although infection also a consideration.  Will get labs, MRI, Neuro eval and see him back in a couple of weeks  Orders:  -     CBC auto differential; Future  -     TSH; Future  -     Vitamin B12; Future  -     Urinalysis, Reflex to Urine Culture Urine, Clean Catch  -     CBC auto differential  -     TSH  -     Vitamin B12  -     Ambulatory referral/consult to Neurology; Future  -     MRI Brain Without Contrast; Future    Memory impairment of gradual onset  -     CBC auto differential; Future  -     TSH; Future  -     Vitamin B12; Future  -     CBC auto differential  -     TSH  -     Vitamin B12  -     Ambulatory referral/consult to Neurology; Future    Dementia in Alzheimer's disease  -     MRI Brain Without Contrast; Future

## 2020-03-11 ENCOUNTER — TELEPHONE (OUTPATIENT)
Dept: FAMILY MEDICINE | Facility: CLINIC | Age: 75
End: 2020-03-11

## 2020-03-11 ENCOUNTER — HOSPITAL ENCOUNTER (OUTPATIENT)
Dept: RADIOLOGY | Facility: HOSPITAL | Age: 75
Discharge: HOME | End: 2020-03-11
Attending: INTERNAL MEDICINE
Payer: MEDICARE

## 2020-03-11 DIAGNOSIS — G30.9 DEMENTIA IN ALZHEIMER'S DISEASE: ICD-10-CM

## 2020-03-11 DIAGNOSIS — F02.80 DEMENTIA IN ALZHEIMER'S DISEASE: ICD-10-CM

## 2020-03-11 DIAGNOSIS — R41.0 DELIRIUM: ICD-10-CM

## 2020-03-11 PROCEDURE — 70551 MRI BRAIN STEM W/O DYE: CPT | Mod: TC,PO

## 2020-03-11 NOTE — TELEPHONE ENCOUNTER
----- Message from Angel Edwards Jr., MD sent at 3/11/2020 10:49 AM CDT -----  Please call the patient regarding his result.  He has chronic age related changes but nothing acute/new to explain his recent issues

## 2020-03-15 LAB
25(OH)D3 SERPL-MCNC: 41 NG/ML (ref 30–100)
ALBUMIN SERPL-MCNC: 4.4 G/DL (ref 3.6–5.1)
ALBUMIN/GLOB SERPL: 1.6 (CALC) (ref 1–2.5)
ALP SERPL-CCNC: 56 U/L (ref 35–144)
ALT SERPL-CCNC: 11 U/L (ref 9–46)
AST SERPL-CCNC: 14 U/L (ref 10–35)
BASOPHILS # BLD AUTO: 28 CELLS/UL (ref 0–200)
BASOPHILS NFR BLD AUTO: 0.4 %
BILIRUB SERPL-MCNC: 0.7 MG/DL (ref 0.2–1.2)
BUN SERPL-MCNC: 13 MG/DL (ref 7–25)
BUN/CREAT SERPL: NORMAL (CALC) (ref 6–22)
CALCIUM SERPL-MCNC: 9.7 MG/DL (ref 8.6–10.3)
CHLORIDE SERPL-SCNC: 104 MMOL/L (ref 98–110)
CHOLEST SERPL-MCNC: 194 MG/DL
CHOLEST/HDLC SERPL: 3.7 (CALC)
CO2 SERPL-SCNC: 27 MMOL/L (ref 20–32)
CREAT SERPL-MCNC: 0.94 MG/DL (ref 0.7–1.18)
EOSINOPHIL # BLD AUTO: 228 CELLS/UL (ref 15–500)
EOSINOPHIL NFR BLD AUTO: 3.3 %
ERYTHROCYTE [DISTWIDTH] IN BLOOD BY AUTOMATED COUNT: 13.6 % (ref 11–15)
GFRSERPLBLD MDRD-ARVRAT: 78 ML/MIN/1.73M2
GLOBULIN SER CALC-MCNC: 2.7 G/DL (CALC) (ref 1.9–3.7)
GLUCOSE SERPL-MCNC: 83 MG/DL (ref 65–99)
HCT VFR BLD AUTO: 41 % (ref 38.5–50)
HDLC SERPL-MCNC: 53 MG/DL
HGB BLD-MCNC: 13.4 G/DL (ref 13.2–17.1)
LDLC SERPL CALC-MCNC: 124 MG/DL (CALC)
LYMPHOCYTES # BLD AUTO: 2118 CELLS/UL (ref 850–3900)
LYMPHOCYTES NFR BLD AUTO: 30.7 %
MCH RBC QN AUTO: 30.5 PG (ref 27–33)
MCHC RBC AUTO-ENTMCNC: 32.7 G/DL (ref 32–36)
MCV RBC AUTO: 93.4 FL (ref 80–100)
MONOCYTES # BLD AUTO: 676 CELLS/UL (ref 200–950)
MONOCYTES NFR BLD AUTO: 9.8 %
NEUTROPHILS # BLD AUTO: 3850 CELLS/UL (ref 1500–7800)
NEUTROPHILS NFR BLD AUTO: 55.8 %
NONHDLC SERPL-MCNC: 141 MG/DL (CALC)
PLATELET # BLD AUTO: 343 THOUSAND/UL (ref 140–400)
PMV BLD REES-ECKER: 10.9 FL (ref 7.5–12.5)
POTASSIUM SERPL-SCNC: 4 MMOL/L (ref 3.5–5.3)
PROT SERPL-MCNC: 7.1 G/DL (ref 6.1–8.1)
RBC # BLD AUTO: 4.39 MILLION/UL (ref 4.2–5.8)
SODIUM SERPL-SCNC: 138 MMOL/L (ref 135–146)
TRIGL SERPL-MCNC: 77 MG/DL
TSH SERPL-ACNC: 2.04 MIU/L (ref 0.4–4.5)
VIT B12 SERPL-MCNC: 436 PG/ML (ref 200–1100)
WBC # BLD AUTO: 6.9 THOUSAND/UL (ref 3.8–10.8)

## 2020-03-16 ENCOUNTER — TELEPHONE (OUTPATIENT)
Dept: FAMILY MEDICINE | Facility: CLINIC | Age: 75
End: 2020-03-16

## 2020-03-16 NOTE — TELEPHONE ENCOUNTER
----- Message from Angel Edwards Jr., MD sent at 3/16/2020  9:31 AM CDT -----  I have reviewed your results.  They demonstrate no significant or concerning findings.  If you have any additional concerns regarding these tests, please contact me at your convenience.

## 2020-03-23 ENCOUNTER — OFFICE VISIT (OUTPATIENT)
Dept: FAMILY MEDICINE | Facility: CLINIC | Age: 75
End: 2020-03-23
Payer: MEDICARE

## 2020-03-23 VITALS
SYSTOLIC BLOOD PRESSURE: 100 MMHG | HEART RATE: 82 BPM | WEIGHT: 168 LBS | HEIGHT: 67 IN | BODY MASS INDEX: 26.37 KG/M2 | OXYGEN SATURATION: 98 % | TEMPERATURE: 98 F | DIASTOLIC BLOOD PRESSURE: 66 MMHG

## 2020-03-23 DIAGNOSIS — F03.91 DEMENTIA WITH BEHAVIORAL DISTURBANCE, UNSPECIFIED DEMENTIA TYPE: Primary | ICD-10-CM

## 2020-03-23 PROCEDURE — 99212 OFFICE O/P EST SF 10 MIN: CPT | Mod: S$GLB,,, | Performed by: INTERNAL MEDICINE

## 2020-03-23 PROCEDURE — 99212 PR OFFICE/OUTPT VISIT, EST, LEVL II, 10-19 MIN: ICD-10-PCS | Mod: S$GLB,,, | Performed by: INTERNAL MEDICINE

## 2020-03-23 NOTE — PROGRESS NOTES
Subjective:       Patient ID: Amilcar Phillip is a 74 y.o. male.    Chief Complaint: Follow-up (followup labs)    Here for follow up to review labs and MRI.  Cyst improved.  Hasn't heard from Neuro yet.    Review of Systems   Constitutional: Negative for chills, fatigue, fever and unexpected weight change.   HENT: Positive for hearing loss. Negative for congestion, postnasal drip, rhinorrhea, trouble swallowing and voice change.    Eyes: Negative for photophobia and visual disturbance.   Respiratory: Negative for apnea, cough, choking, chest tightness, shortness of breath and wheezing.    Cardiovascular: Negative for chest pain, palpitations and leg swelling.   Gastrointestinal: Negative for abdominal pain, blood in stool, constipation, diarrhea, nausea, rectal pain and vomiting.   Endocrine: Negative for cold intolerance, heat intolerance, polydipsia and polyuria.   Genitourinary: Negative for decreased urine volume, difficulty urinating, discharge, dysuria, flank pain, frequency, genital sores, hematuria, testicular pain and urgency.   Musculoskeletal: Positive for back pain. Negative for arthralgias, gait problem, joint swelling, myalgias and neck pain.   Skin: Negative for color change, rash and wound.   Allergic/Immunologic: Negative for environmental allergies and food allergies.   Neurological: Negative for dizziness, tremors, seizures, syncope, facial asymmetry, speech difficulty, weakness, light-headedness, numbness and headaches.   Hematological: Negative for adenopathy. Does not bruise/bleed easily.   Psychiatric/Behavioral: Positive for confusion. Negative for hallucinations, sleep disturbance and suicidal ideas. The patient is not nervous/anxious.        Past Medical History:   Diagnosis Date    Hypercholesteremia     Hypertension     Vitamin D deficiency       Past Surgical History:   Procedure Laterality Date    COLONOSCOPY      HERNIA REPAIR         Family History   Problem Relation Age of  Onset    Osteoporosis Mother     Vaginal cancer Mother     Stroke Father        Social History     Socioeconomic History    Marital status:      Spouse name: Not on file    Number of children: Not on file    Years of education: Not on file    Highest education level: Not on file   Occupational History    Occupation: retired   Social Needs    Financial resource strain: Not on file    Food insecurity:     Worry: Not on file     Inability: Not on file    Transportation needs:     Medical: Not on file     Non-medical: Not on file   Tobacco Use    Smoking status: Former Smoker     Types: Cigarettes, Cigars     Last attempt to quit:      Years since quittin.2    Smokeless tobacco: Never Used   Substance and Sexual Activity    Alcohol use: Yes     Frequency: 4 or more times a week     Drinks per session: 1 or 2    Drug use: Never    Sexual activity: Not Currently   Lifestyle    Physical activity:     Days per week: Not on file     Minutes per session: Not on file    Stress: Not at all   Relationships    Social connections:     Talks on phone: Not on file     Gets together: Not on file     Attends Confucianist service: Not on file     Active member of club or organization: Not on file     Attends meetings of clubs or organizations: Not on file     Relationship status: Not on file   Other Topics Concern    Not on file   Social History Narrative    Live in assistive living center       Current Outpatient Medications   Medication Sig Dispense Refill    acetaminophen (TYLENOL) 650 MG TbSR Take 1 tablet (650 mg total) by mouth every 8 (eight) hours. PRN FEVER 40 tablet 0    cholecalciferol, vitamin D3, 5,000 unit TbDL Take 1 tablet by mouth once daily.      indomethacin (INDOCIN) 50 MG capsule Take 1 capsule (50 mg total) by mouth 2 (two) times daily with meals. 90 capsule 0    multivit-min/FA/lycopen/lutein (CENTRUM SILVER MEN ORAL) Take 1 tablet by mouth once daily.      pravastatin  "(PRAVACHOL) 10 MG tablet Take 1 tablet (10 mg total) by mouth once daily. 90 tablet 1    sulfamethoxazole-trimethoprim 800-160mg (BACTRIM DS) 800-160 mg Tab Take 1 tablet by mouth 2 (two) times daily. 20 tablet 0    valsartan (DIOVAN) 40 MG tablet Take 1 tablet (40 mg total) by mouth once daily. 90 tablet 1     No current facility-administered medications for this visit.        Review of patient's allergies indicates:  No Known Allergies  Objective:    HPI     Follow-up      Additional comments: followup labs          Last edited by Tomer Sampson MA on 3/23/2020  2:04 PM. (History)      Blood pressure 100/66, pulse 82, temperature 98.2 °F (36.8 °C), temperature source Oral, height 5' 7" (1.702 m), weight 76.2 kg (168 lb), SpO2 98 %. Body mass index is 26.31 kg/m².   Physical Exam      Office Visit on 03/09/2020   Component Date Value Ref Range Status    Glucose 03/13/2020 83  65 - 99 mg/dL Final    Comment:               Fasting reference interval         BUN, Bld 03/13/2020 13  7 - 25 mg/dL Final    Creatinine 03/13/2020 0.94  0.70 - 1.18 mg/dL Final    Comment: For patients >49 years of age, the reference limit  for Creatinine is approximately 13% higher for people  identified as -American.         eGFR if non African American 03/13/2020 78  > OR = 60 mL/min/1.73m2 Final    eGFR if African American 03/13/2020 91  > OR = 60 mL/min/1.73m2 Final    BUN/Creatinine Ratio 03/13/2020 NOT APPLICABLE  6 - 22 (calc) Final    Sodium 03/13/2020 138  135 - 146 mmol/L Final    Potassium 03/13/2020 4.0  3.5 - 5.3 mmol/L Final    Chloride 03/13/2020 104  98 - 110 mmol/L Final    CO2 03/13/2020 27  20 - 32 mmol/L Final    Calcium 03/13/2020 9.7  8.6 - 10.3 mg/dL Final    Total Protein 03/13/2020 7.1  6.1 - 8.1 g/dL Final    Albumin 03/13/2020 4.4  3.6 - 5.1 g/dL Final    Globulin, Total 03/13/2020 2.7  1.9 - 3.7 g/dL (calc) Final    Albumin/Globulin Ratio 03/13/2020 1.6  1.0 - 2.5 (calc) Final    Total " Bilirubin 03/13/2020 0.7  0.2 - 1.2 mg/dL Final    Alkaline Phosphatase 03/13/2020 56  35 - 144 U/L Final    AST 03/13/2020 14  10 - 35 U/L Final    ALT 03/13/2020 11  9 - 46 U/L Final    Vitamin D, 25-OH, Total 03/13/2020 41  30 - 100 ng/mL Final    Comment: Vitamin D Status         25-OH Vitamin D:     Deficiency:                    <20 ng/mL  Insufficiency:             20 - 29 ng/mL  Optimal:                 > or = 30 ng/mL     For 25-OH Vitamin D testing on patients on   D2-supplementation and patients for whom quantitation   of D2 and D3 fractions is required, the QuestAssureD(TM)  25-OH VIT D, (D2,D3), LC/MS/MS is recommended: order   code 23563 (patients >2yrs).     For more information on this test, go to:  http://education.[employer]/faq/WZU634  (This link is being provided for   informational/educational purposes only.)      WBC 03/13/2020 6.9  3.8 - 10.8 Thousand/uL Final    RBC 03/13/2020 4.39  4.20 - 5.80 Million/uL Final    Hemoglobin 03/13/2020 13.4  13.2 - 17.1 g/dL Final    Hematocrit 03/13/2020 41.0  38.5 - 50.0 % Final    Mean Corpuscular Volume 03/13/2020 93.4  80.0 - 100.0 fL Final    Mean Corpuscular Hemoglobin 03/13/2020 30.5  27.0 - 33.0 pg Final    Mean Corpuscular Hemoglobin Conc 03/13/2020 32.7  32.0 - 36.0 g/dL Final    RDW 03/13/2020 13.6  11.0 - 15.0 % Final    Platelets 03/13/2020 343  140 - 400 Thousand/uL Final    MPV 03/13/2020 10.9  7.5 - 12.5 fL Final    Neutrophils Absolute 03/13/2020 3,850  1,500 - 7,800 cells/uL Final    Lymph # 03/13/2020 2,118  850 - 3,900 cells/uL Final    Mono # 03/13/2020 676  200 - 950 cells/uL Final    Eos # 03/13/2020 228  15 - 500 cells/uL Final    Baso # 03/13/2020 28  0 - 200 cells/uL Final    Neutrophils Relative 03/13/2020 55.8  % Final    Lymph% 03/13/2020 30.7  % Final    Mono% 03/13/2020 9.8  % Final    Eosinophil% 03/13/2020 3.3  % Final    Basophil% 03/13/2020 0.4  % Final    TSH 03/13/2020 2.04  0.40 - 4.50  mIU/L Final    Cholesterol 03/13/2020 194  <200 mg/dL Final    HDL 03/13/2020 53  > OR = 40 mg/dL Final    Triglycerides 03/13/2020 77  <150 mg/dL Final    LDL Cholesterol 03/13/2020 124* mg/dL (calc) Final    Comment: Reference range: <100     Desirable range <100 mg/dL for primary prevention;    <70 mg/dL for patients with CHD or diabetic patients   with > or = 2 CHD risk factors.     LDL-C is now calculated using the Susan   calculation, which is a validated novel method providing   better accuracy than the Friedewald equation in the   estimation of LDL-C.   Roberto ELIZALDE et al. TONE. 2013;310(19): 3017-5273   (http://[employer]/faq/ZIV603)      Hdl/Cholesterol Ratio 03/13/2020 3.7  <5.0 (calc) Final    Non HDL Chol. (LDL+VLDL) 03/13/2020 141* <130 mg/dL (calc) Final    Comment: For patients with diabetes plus 1 major ASCVD risk   factor, treating to a non-HDL-C goal of <100 mg/dL   (LDL-C of <70 mg/dL) is considered a therapeutic   option.      Vitamin B-12 03/13/2020 436  200 - 1,100 pg/mL Final   ]    MRI:  Impression       1. No acute intracranial abnormalities.  2. Confluent white matter signal abnormalities throughout both cerebral hemispheres.  While nonspecific, these are likely on the basis of severe chronic microvascular white matter ischemia.  3. Empty sella turcica.       Assessment:       1. Dementia with behavioral disturbance, unspecified dementia type        Plan:       Amilcar was seen today for follow-up.    Diagnoses and all orders for this visit:    Dementia with behavioral disturbance, unspecified dementia type  Comments:  Vascular va Alzheimers.  Given number to call Neuro for appointment.  Discussed meds with sister; will defer until Neuro eval.

## 2020-04-06 RX ORDER — ACETAMINOPHEN AND PHENYLEPHRINE HCL 325; 5 MG/1; MG/1
1 TABLET ORAL DAILY
Qty: 30 TABLET | Refills: 3 | Status: SHIPPED | OUTPATIENT
Start: 2020-04-06 | End: 2020-08-17 | Stop reason: SDUPTHER

## 2020-04-29 ENCOUNTER — OFFICE VISIT (OUTPATIENT)
Dept: FAMILY MEDICINE | Facility: CLINIC | Age: 75
End: 2020-04-29
Payer: MEDICARE

## 2020-04-29 VITALS
SYSTOLIC BLOOD PRESSURE: 130 MMHG | TEMPERATURE: 99 F | BODY MASS INDEX: 25.58 KG/M2 | HEIGHT: 67 IN | HEART RATE: 70 BPM | WEIGHT: 163 LBS | OXYGEN SATURATION: 97 % | DIASTOLIC BLOOD PRESSURE: 80 MMHG

## 2020-04-29 DIAGNOSIS — F03.91 DEMENTIA WITH BEHAVIORAL DISTURBANCE, UNSPECIFIED DEMENTIA TYPE: Primary | ICD-10-CM

## 2020-04-29 PROCEDURE — 99213 PR OFFICE/OUTPT VISIT, EST, LEVL III, 20-29 MIN: ICD-10-PCS | Mod: S$GLB,,, | Performed by: INTERNAL MEDICINE

## 2020-04-29 PROCEDURE — 99213 OFFICE O/P EST LOW 20 MIN: CPT | Mod: S$GLB,,, | Performed by: INTERNAL MEDICINE

## 2020-04-29 RX ORDER — ADHESIVE BANDAGE
15 BANDAGE TOPICAL DAILY PRN
Status: ON HOLD | COMMUNITY
End: 2022-07-22 | Stop reason: HOSPADM

## 2020-04-29 RX ORDER — OXCARBAZEPINE 150 MG/1
1 TABLET, FILM COATED ORAL 2 TIMES DAILY
COMMUNITY
Start: 2020-04-21 | End: 2020-05-18 | Stop reason: SDUPTHER

## 2020-04-29 RX ORDER — HYDROXYZINE PAMOATE 50 MG/1
50 CAPSULE ORAL EVERY 6 HOURS PRN
COMMUNITY
End: 2020-04-29

## 2020-04-29 RX ORDER — INDOMETHACIN 50 MG/1
50 CAPSULE ORAL 2 TIMES DAILY PRN
COMMUNITY
End: 2021-08-26

## 2020-04-29 RX ORDER — HALOPERIDOL 2 MG/ML
5 SOLUTION ORAL EVERY 6 HOURS PRN
COMMUNITY
End: 2020-04-29

## 2020-04-29 RX ORDER — TRAZODONE HYDROCHLORIDE 50 MG/1
50 TABLET ORAL EVERY 12 HOURS PRN
COMMUNITY
End: 2020-04-29

## 2020-04-29 RX ORDER — RISPERIDONE 0.25 MG/1
1 TABLET ORAL DAILY
COMMUNITY
Start: 2020-04-21 | End: 2020-05-18 | Stop reason: SDUPTHER

## 2020-04-29 RX ORDER — HALOPERIDOL 1 MG/1
1 TABLET ORAL EVERY 6 HOURS PRN
COMMUNITY
End: 2020-04-29

## 2020-04-29 RX ORDER — DIPHENHYDRAMINE HCL 25 MG
25 CAPSULE ORAL EVERY 6 HOURS PRN
COMMUNITY
End: 2020-04-29

## 2020-04-29 RX ORDER — CLONIDINE HYDROCHLORIDE 0.1 MG/1
0.1 TABLET ORAL 2 TIMES DAILY PRN
COMMUNITY
End: 2020-04-29

## 2020-04-29 RX ORDER — IBUPROFEN 200 MG
400 TABLET ORAL EVERY 6 HOURS PRN
COMMUNITY
End: 2022-01-24 | Stop reason: ALTCHOICE

## 2020-04-29 RX ORDER — LORAZEPAM 0.5 MG/1
0.5 TABLET ORAL EVERY 8 HOURS PRN
COMMUNITY
End: 2020-04-29

## 2020-04-29 RX ORDER — MAG HYDROX/ALUMINUM HYD/SIMETH 200-200-20
20 SUSPENSION, ORAL (FINAL DOSE FORM) ORAL EVERY 4 HOURS PRN
COMMUNITY
End: 2020-04-29

## 2020-04-29 RX ORDER — SERTRALINE HYDROCHLORIDE 50 MG/1
1 TABLET, FILM COATED ORAL DAILY
COMMUNITY
Start: 2020-04-21 | End: 2020-05-18 | Stop reason: SDUPTHER

## 2020-04-29 RX ORDER — DONEPEZIL HYDROCHLORIDE 5 MG/1
1 TABLET, FILM COATED ORAL NIGHTLY
COMMUNITY
Start: 2020-04-21 | End: 2020-05-18 | Stop reason: SDUPTHER

## 2020-04-29 RX ORDER — CHOLECALCIFEROL (VITAMIN D3) 125 MCG
1 CAPSULE ORAL DAILY
COMMUNITY
Start: 2020-04-06 | End: 2020-04-29

## 2020-04-29 NOTE — PROGRESS NOTES
Subjective:       Patient ID: Amilcar Phillip is a 74 y.o. male.    Chief Complaint: Follow-up (hospital followup([place]))    Here for hospital follow up.  His behavior worsened before he could get in to see Neuro and he was admitted to [facility].  He was apparently having some hallucinations and exhibiting some aggressive behavior.  New medications on discharge include trileptal, zoloft, aricept, risperdal.  He reports that he feels much better since discharge.   Hasn't gotten appointment with Neuro d/t COVID19 restrictions; he can't really so a virtual visit.      Review of Systems   Constitutional: Negative for chills, fatigue, fever and unexpected weight change.   HENT: Negative for congestion, hearing loss, postnasal drip, rhinorrhea, trouble swallowing and voice change.    Eyes: Negative for photophobia and visual disturbance.   Respiratory: Negative for apnea, cough, choking, chest tightness, shortness of breath and wheezing.    Cardiovascular: Negative for chest pain, palpitations and leg swelling.   Gastrointestinal: Negative for abdominal pain, blood in stool, constipation, diarrhea, nausea, rectal pain and vomiting.   Endocrine: Negative for cold intolerance, heat intolerance, polydipsia and polyuria.   Genitourinary: Negative for decreased urine volume, difficulty urinating, discharge, dysuria, flank pain, frequency, genital sores, hematuria, testicular pain and urgency.   Musculoskeletal: Positive for back pain. Negative for arthralgias, gait problem, joint swelling, myalgias and neck pain.   Skin: Negative for color change, rash and wound.   Allergic/Immunologic: Negative for environmental allergies and food allergies.   Neurological: Negative for dizziness, tremors, seizures, syncope, facial asymmetry, speech difficulty, weakness, light-headedness, numbness and headaches.   Hematological: Negative for adenopathy. Does not bruise/bleed easily.   Psychiatric/Behavioral: Negative for  confusion, hallucinations, sleep disturbance and suicidal ideas. The patient is not nervous/anxious.        Past Medical History:   Diagnosis Date    Hypercholesteremia     Hypertension     Vitamin D deficiency       Past Surgical History:   Procedure Laterality Date    COLONOSCOPY      HERNIA REPAIR         Family History   Problem Relation Age of Onset    Osteoporosis Mother     Vaginal cancer Mother     Stroke Father        Social History     Socioeconomic History    Marital status:      Spouse name: Not on file    Number of children: Not on file    Years of education: Not on file    Highest education level: Not on file   Occupational History    Occupation: retired   Social Needs    Financial resource strain: Not on file    Food insecurity:     Worry: Not on file     Inability: Not on file    Transportation needs:     Medical: Not on file     Non-medical: Not on file   Tobacco Use    Smoking status: Former Smoker     Types: Cigarettes, Cigars     Last attempt to quit:      Years since quittin.3    Smokeless tobacco: Never Used   Substance and Sexual Activity    Alcohol use: Yes     Frequency: 4 or more times a week     Drinks per session: 1 or 2    Drug use: Never    Sexual activity: Not Currently   Lifestyle    Physical activity:     Days per week: Not on file     Minutes per session: Not on file    Stress: Not at all   Relationships    Social connections:     Talks on phone: Not on file     Gets together: Not on file     Attends Oriental orthodox service: Not on file     Active member of club or organization: Not on file     Attends meetings of clubs or organizations: Not on file     Relationship status: Not on file   Other Topics Concern    Not on file   Social History Narrative    Live in assistive living center       Current Outpatient Medications   Medication Sig Dispense Refill    acetaminophen (TYLENOL) 650 MG TbSR Take 1 tablet (650 mg total) by mouth every 8 (eight)  "hours. PRN FEVER 40 tablet 0    cholecalciferol, vitamin D3, 5,000 unit TbDL Take 1 tablet by mouth once daily. 30 tablet 3    donepeziL (ARICEPT) 5 MG tablet Take 1 tablet by mouth every evening.      ibuprofen (ADVIL,MOTRIN) 200 MG tablet Take 400 mg by mouth every 6 (six) hours as needed for Pain.      indomethacin (INDOCIN) 50 MG capsule Take 50 mg by mouth 2 (two) times daily as needed. For gout flare.  Take with meals.  Do not take more than 3 days consecutively      magnesium hydroxide 400 mg/5 ml (MILK OF MAGNESIA) 400 mg/5 mL Susp Take 30 mLs by mouth daily as needed.      multivit-min/FA/lycopen/lutein (CENTRUM SILVER MEN ORAL) Take 1 tablet by mouth once daily.      OXcarbazepine (TRILEPTAL) 150 MG Tab Take 1 tablet by mouth 2 (two) times a day.      pravastatin (PRAVACHOL) 10 MG tablet Take 1 tablet (10 mg total) by mouth once daily. 90 tablet 1    risperiDONE (RISPERDAL) 0.25 MG Tab Take 1 tablet by mouth once daily.      sertraline (ZOLOFT) 50 MG tablet Take 1 tablet by mouth once daily.      valsartan (DIOVAN) 40 MG tablet Take 1 tablet (40 mg total) by mouth once daily. 90 tablet 1     No current facility-administered medications for this visit.        Review of patient's allergies indicates:  No Known Allergies  Objective:    HPI     Follow-up      Additional comments: hospital followup([place])          Last edited by Tomer Sampson MA on 4/29/2020  1:03 PM. (History)      Blood pressure 130/80, pulse 70, temperature 98.6 °F (37 °C), temperature source Oral, height 5' 7" (1.702 m), weight 73.9 kg (163 lb), SpO2 97 %. Body mass index is 25.53 kg/m².   Physical Exam   Constitutional: He appears well-developed. No distress.   HENT:   Nose: Nose normal.   Mouth/Throat: Oropharynx is clear and moist.   Eyes: Conjunctivae are normal. Right eye exhibits no discharge. Left eye exhibits no discharge. No scleral icterus.   Neck: Carotid bruit is not present.   Cardiovascular: Normal rate, regular " rhythm and normal heart sounds.   No murmur heard.  Pulmonary/Chest: Effort normal and breath sounds normal. No respiratory distress. He has no decreased breath sounds. He has no wheezes. He has no rhonchi. He has no rales.   Abdominal: Soft. He exhibits no distension. There is no tenderness. There is no rebound and no guarding.   Musculoskeletal: He exhibits no edema.        Thoracic back: He exhibits deformity (kyphosis).   Neurological: He is alert. He displays no tremor.   Skin: Skin is warm and dry.   Psychiatric: He has a normal mood and affect. His speech is normal and behavior is normal. Cognition and memory are impaired.   Nursing note and vitals reviewed.          Assessment:       1. Dementia with behavioral disturbance, unspecified dementia type        Plan:       Amilcar was seen today for follow-up.    Diagnoses and all orders for this visit:    Dementia with behavioral disturbance, unspecified dementia type  Comments:  Seems stable on current meds.  Will try to get him in to see Neuro once COVID19 restrictions lifted.

## 2020-05-18 RX ORDER — RISPERIDONE 0.25 MG/1
0.25 TABLET ORAL DAILY
Qty: 90 TABLET | Refills: 1 | Status: SHIPPED | OUTPATIENT
Start: 2020-05-18 | End: 2020-10-19

## 2020-05-18 RX ORDER — OXCARBAZEPINE 150 MG/1
150 TABLET, FILM COATED ORAL 2 TIMES DAILY
Qty: 180 TABLET | Refills: 1 | Status: SHIPPED | OUTPATIENT
Start: 2020-05-18 | End: 2020-10-19

## 2020-05-18 RX ORDER — DONEPEZIL HYDROCHLORIDE 5 MG/1
5 TABLET, FILM COATED ORAL NIGHTLY
Qty: 90 TABLET | Refills: 1 | Status: SHIPPED | OUTPATIENT
Start: 2020-05-18 | End: 2020-10-19

## 2020-05-18 RX ORDER — SERTRALINE HYDROCHLORIDE 50 MG/1
50 TABLET, FILM COATED ORAL DAILY
Qty: 90 TABLET | Refills: 1 | Status: SHIPPED | OUTPATIENT
Start: 2020-05-18 | End: 2021-01-08

## 2020-06-29 DIAGNOSIS — R26.9 GAIT DISTURBANCE: Primary | ICD-10-CM

## 2020-06-29 DIAGNOSIS — R41.3 MEMORY DEFICIT: Primary | ICD-10-CM

## 2020-07-06 ENCOUNTER — OFFICE VISIT (OUTPATIENT)
Dept: FAMILY MEDICINE | Facility: CLINIC | Age: 75
End: 2020-07-06
Payer: MEDICARE

## 2020-07-06 VITALS
OXYGEN SATURATION: 95 % | BODY MASS INDEX: 27.94 KG/M2 | TEMPERATURE: 97 F | HEIGHT: 67 IN | SYSTOLIC BLOOD PRESSURE: 108 MMHG | WEIGHT: 178 LBS | DIASTOLIC BLOOD PRESSURE: 68 MMHG | HEART RATE: 73 BPM

## 2020-07-06 DIAGNOSIS — R13.10 DYSPHAGIA, UNSPECIFIED TYPE: ICD-10-CM

## 2020-07-06 DIAGNOSIS — I10 ESSENTIAL HYPERTENSION: Primary | ICD-10-CM

## 2020-07-06 DIAGNOSIS — F03.91 DEMENTIA WITH BEHAVIORAL DISTURBANCE, UNSPECIFIED DEMENTIA TYPE: ICD-10-CM

## 2020-07-06 PROCEDURE — 99213 PR OFFICE/OUTPT VISIT, EST, LEVL III, 20-29 MIN: ICD-10-PCS | Mod: S$GLB,,, | Performed by: INTERNAL MEDICINE

## 2020-07-06 PROCEDURE — 99213 OFFICE O/P EST LOW 20 MIN: CPT | Mod: S$GLB,,, | Performed by: INTERNAL MEDICINE

## 2020-07-06 RX ORDER — CHOLECALCIFEROL (VITAMIN D3) 125 MCG
1 CAPSULE ORAL DAILY
COMMUNITY
End: 2020-07-06

## 2020-07-06 NOTE — PROGRESS NOTES
Subjective:       Patient ID: Amilcar Phillip is a 74 y.o. male.    Chief Complaint: Dementia (f/u)    Here for follow up.  He saw Neuro last week; no changes made to medications but PT/OT/ST was ordered.  No further reports of behavioral issues.   Has noted some issues with swallowing liquids in the last month or so; tends to run out of left side of his mouth when he tries to drink something.      Hypertension  This is a chronic problem. The problem is controlled. Pertinent negatives include no chest pain, headaches, neck pain, palpitations, peripheral edema or shortness of breath. Past treatments include angiotensin blockers. There are no compliance problems.      Review of Systems   Constitutional: Negative for chills, fatigue, fever and unexpected weight change.   HENT: Positive for trouble swallowing. Negative for congestion, hearing loss, postnasal drip, rhinorrhea and voice change.    Eyes: Negative for photophobia and visual disturbance.   Respiratory: Negative for apnea, cough, choking, chest tightness, shortness of breath and wheezing.    Cardiovascular: Negative for chest pain, palpitations and leg swelling.   Gastrointestinal: Negative for abdominal pain, blood in stool, constipation, diarrhea, nausea, rectal pain and vomiting.   Endocrine: Negative for cold intolerance, heat intolerance, polydipsia and polyuria.   Genitourinary: Negative for decreased urine volume, difficulty urinating, discharge, dysuria, flank pain, frequency, genital sores, hematuria, testicular pain and urgency.   Musculoskeletal: Positive for back pain. Negative for arthralgias, gait problem, joint swelling, myalgias and neck pain.   Skin: Negative for color change, rash and wound.   Allergic/Immunologic: Negative for environmental allergies and food allergies.   Neurological: Negative for dizziness, tremors, seizures, syncope, facial asymmetry, speech difficulty, weakness, light-headedness, numbness and headaches.    Hematological: Negative for adenopathy. Does not bruise/bleed easily.   Psychiatric/Behavioral: Negative for hallucinations, sleep disturbance and suicidal ideas. The patient is not nervous/anxious.        Past Medical History:   Diagnosis Date    Hypercholesteremia     Hypertension     Vitamin D deficiency       Past Surgical History:   Procedure Laterality Date    COLONOSCOPY      HERNIA REPAIR         Family History   Problem Relation Age of Onset    Osteoporosis Mother     Vaginal cancer Mother     Stroke Father        Social History     Socioeconomic History    Marital status:      Spouse name: Not on file    Number of children: Not on file    Years of education: Not on file    Highest education level: Not on file   Occupational History    Occupation: retired   Social Needs    Financial resource strain: Not on file    Food insecurity     Worry: Not on file     Inability: Not on file    Transportation needs     Medical: Not on file     Non-medical: Not on file   Tobacco Use    Smoking status: Former Smoker     Types: Cigarettes, Cigars     Quit date:      Years since quittin.5    Smokeless tobacco: Never Used   Substance and Sexual Activity    Alcohol use: Yes     Frequency: 4 or more times a week     Drinks per session: 1 or 2    Drug use: Never    Sexual activity: Not Currently   Lifestyle    Physical activity     Days per week: Not on file     Minutes per session: Not on file    Stress: Not at all   Relationships    Social connections     Talks on phone: Not on file     Gets together: Not on file     Attends Mu-ism service: Not on file     Active member of club or organization: Not on file     Attends meetings of clubs or organizations: Not on file     Relationship status: Not on file   Other Topics Concern    Not on file   Social History Narrative    Live in assistive living center       Current Outpatient Medications   Medication Sig Dispense Refill     "acetaminophen (TYLENOL) 650 MG TbSR Take 1 tablet (650 mg total) by mouth every 8 (eight) hours. PRN FEVER 40 tablet 0    cholecalciferol, vitamin D3, 5,000 unit TbDL Take 1 tablet by mouth once daily. 30 tablet 3    donepeziL (ARICEPT) 5 MG tablet Take 1 tablet (5 mg total) by mouth every evening. 90 tablet 1    ibuprofen (ADVIL,MOTRIN) 200 MG tablet Take 400 mg by mouth every 6 (six) hours as needed for Pain.      indomethacin (INDOCIN) 50 MG capsule Take 50 mg by mouth 2 (two) times daily as needed. For gout flare.  Take with meals.  Do not take more than 3 days consecutively      magnesium hydroxide 400 mg/5 ml (MILK OF MAGNESIA) 400 mg/5 mL Susp Take 30 mLs by mouth daily as needed.      multivit-min/FA/lycopen/lutein (CENTRUM SILVER MEN ORAL) Take 1 tablet by mouth once daily.      OXcarbazepine (TRILEPTAL) 150 MG Tab Take 1 tablet (150 mg total) by mouth 2 (two) times a day. 180 tablet 1    pravastatin (PRAVACHOL) 10 MG tablet Take 1 tablet (10 mg total) by mouth once daily. 90 tablet 1    risperiDONE (RISPERDAL) 0.25 MG Tab Take 1 tablet (0.25 mg total) by mouth once daily. 90 tablet 1    sertraline (ZOLOFT) 50 MG tablet Take 1 tablet (50 mg total) by mouth once daily. 90 tablet 1    valsartan (DIOVAN) 40 MG tablet Take 1 tablet (40 mg total) by mouth once daily. 90 tablet 1     No current facility-administered medications for this visit.        Review of patient's allergies indicates:  No Known Allergies  Objective:    HPI     Dementia      Additional comments: f/u          Last edited by Lizeth Ordoñez LPN on 7/6/2020  9:46 AM. (History)      Blood pressure 108/68, pulse 73, temperature 97.2 °F (36.2 °C), temperature source Temporal, height 5' 7" (1.702 m), weight 80.7 kg (178 lb), SpO2 95 %. Body mass index is 27.88 kg/m².   Physical Exam  Vitals signs and nursing note reviewed.   Constitutional:       General: He is not in acute distress.     Appearance: He is well-developed. He is not " ill-appearing, toxic-appearing or diaphoretic.   HENT:      Nose: Nose normal.   Eyes:      General: No scleral icterus.        Right eye: No discharge.         Left eye: No discharge.      Conjunctiva/sclera: Conjunctivae normal.   Neck:      Vascular: No carotid bruit.   Cardiovascular:      Rate and Rhythm: Normal rate and regular rhythm.      Heart sounds: Normal heart sounds. No murmur.   Pulmonary:      Effort: Pulmonary effort is normal. No respiratory distress.      Breath sounds: Normal breath sounds. No decreased breath sounds, wheezing, rhonchi or rales.   Abdominal:      General: There is no distension.      Palpations: Abdomen is soft.      Tenderness: There is no abdominal tenderness. There is no guarding or rebound.   Musculoskeletal:      Thoracic back: He exhibits deformity (kyphosis).   Skin:     General: Skin is warm and dry.   Neurological:      Mental Status: He is alert.      Cranial Nerves: No facial asymmetry.      Motor: No tremor.      Gait: Gait abnormal (ambulation with cane).   Psychiatric:         Speech: Speech normal.         Behavior: Behavior normal.         Cognition and Memory: Memory is impaired.             Assessment:       1. Essential hypertension    2. Dysphagia, unspecified type    3. Dementia with behavioral disturbance, unspecified dementia type        Plan:       Amilcar was seen today for dementia.    Diagnoses and all orders for this visit:    Essential hypertension    Dysphagia, unspecified type  Comments:  To be evaluated by ST this week    Dementia with behavioral disturbance, unspecified dementia type  Comments:  Stable.

## 2020-07-10 ENCOUNTER — CLINICAL SUPPORT (OUTPATIENT)
Dept: REHABILITATION | Facility: HOSPITAL | Age: 75
End: 2020-07-10
Attending: NURSE PRACTITIONER
Payer: MEDICARE

## 2020-07-10 ENCOUNTER — CLINICAL SUPPORT (OUTPATIENT)
Dept: REHABILITATION | Facility: HOSPITAL | Age: 75
End: 2020-07-10
Payer: MEDICARE

## 2020-07-10 DIAGNOSIS — R26.9 GAIT DISTURBANCE: Primary | ICD-10-CM

## 2020-07-10 DIAGNOSIS — R41.841 COGNITIVE COMMUNICATION DEFICIT: ICD-10-CM

## 2020-07-10 DIAGNOSIS — R26.9 GAIT DISTURBANCE: ICD-10-CM

## 2020-07-10 DIAGNOSIS — G30.9 ALZHEIMER'S DEMENTIA WITH BEHAVIORAL DISTURBANCE, UNSPECIFIED TIMING OF DEMENTIA ONSET: Primary | ICD-10-CM

## 2020-07-10 DIAGNOSIS — F02.81 ALZHEIMER'S DEMENTIA WITH BEHAVIORAL DISTURBANCE, UNSPECIFIED TIMING OF DEMENTIA ONSET: Primary | ICD-10-CM

## 2020-07-10 PROCEDURE — 96125 COGNITIVE TEST BY HC PRO: CPT | Mod: 59

## 2020-07-10 PROCEDURE — 97161 PT EVAL LOW COMPLEX 20 MIN: CPT

## 2020-07-10 PROCEDURE — 97165 OT EVAL LOW COMPLEX 30 MIN: CPT

## 2020-07-10 NOTE — PLAN OF CARE
"  [facility]  Initial Evaluation and Discharge     Date: 7/10/2020  Name: Amilcar Phillip  Clinic Number: [id number]    Therapy Diagnosis: none  Physician: Lindsey Felix NP    Physician Orders: Eval and treat  Medical Diagnosis: Alzheimer's  Surgical Procedure and Date: N/A  Evaluation Date: 7/10/2020  Insurance Authorization Period Expiration: 12/31/2020  Plan of Care Certification Period: 7/10/2020-  Date of Return to MD: ?    Visit # / Visits authorized: 1/104  Time In:1:15  Time Out: 2:00  Total Billable Time:  minutes    Precautions:  Standard, dementia with behavioral disturbance- on new medication for this    Subjective   Patient states: "Able to do everything for myself."  Involved Side: n/a  Dominant Side: Right  Date of Onset: n/a  History of Current Condition/Mechanism of Injury:  Moved here in Dec. To [facility] from private home in [place].  Imaging:none  Previous Therapy: none    Past Medical History/Physical Systems Review:    has a past medical history of Hypercholesteremia, Hypertension, and Vitamin D deficiency.   has a past surgical history that includes Colonoscopy and Hernia repair.  has a current medication list which includes the following prescription(s): acetaminophen, cholecalciferol (vitamin d3), donepezil, ibuprofen, indomethacin, magnesium hydroxide 400 mg/5 ml, multivit-min/fa/lycopen/lutein, oxcarbazepine, pravastatin, risperidone, sertraline, and valsartan.    Review of patient's allergies indicates:  No Known Allergies     Patient's Goals for Therapy: none    Pain: none for UE's  Functional Limitations/Social History:  Previous functional status includes: Independent with all self care and home management.  Current Functional Status   Home/Living environment : lives in an assisted living facility      ADL Assessment  ADL    Dressing Independent    Eating independent   Toileting independent   Cooking Not performing any " meal prep; able to get self snack and drink available in his appt   Bathing/Grooming Independent bathing and shaving   Household tasks Laundry carrying laundry and supplies   By patient report      Objective   Observation:  Sitting lean to the right, very kyphotic    Palpation:  No abnormalities  AROM:  BUE's grossly WNL at all joints  MMT: Shoulders, elbows forearms and wrist mm 5/5  Minimal discomfort with left shoulder abduction    Hand -  Strength   Jaymar dynamometer  in lbs  Pinches:  pinch gauge average of 2 trials in psi    7/10/2020 7/10/2020   Position Right Left   Hand : 2nd Rung 75 55   Hand :  2nd Rung 65 55   Hand :  2nd Rung 75 57    Average 71 55   Pinch Lateral 17 15.5   3 jaw gerardo 14 13   Tip 9 8.5    Coordination: fingertip to nose and thumb to each fingertip-accurate with minimal decrease in speed    Assessment     Patient is a 74 y.o. right handed male presenting to skilled OT with diagnosis of Alzheimer's. Patient with no pain of UE's.  His UE's ROM is grossly WNL.  His UE's strength is grossly WNL..  His  and pinch strengths are good. He reports no limitations in self care and with daily activities.Coordination is accurate with minimal decrease in speed.  A brief history was obtained.  During the evaluation, the patient required no modification or assistance.       Plan   Discharge.  Skilled OT not warranted at this time      Sommer Maya OT

## 2020-07-10 NOTE — PLAN OF CARE
"[facility] OUTPATIENT THERAPY  Physical Therapy Initial Evaluation/POC    Name: Amilcar Phillip  Clinic Number: [id number]    Therapy Diagnosis:   Encounter Diagnosis   Name Primary?    Gait disturbance Yes     Physician: Lindsey Fleix, KIRAN    Physician Orders: PT Eval and Treat   Medical Diagnosis from Referral: R26.9 (ICD-10-CM) - Gait disturbance   Evaluation Date: 7/10/2020  Authorization Period Expiration: 12/31/2020  Plan of Care Expiration: 07/10/2020  Visit # / Visits authorized: 1/ 104  Total visit count: 1    Precautions: Fall    Subjective     Date of onset: No specific onset date    History of current condition -PT is aware that the patient has early dementia history. Amilcar reports: that the  Wanted him "to get seen about his swallowing." The patient reports no change in his ability to walk. He reports that he uses a cane but that he uses it more to get out of a chair then the need to walk. [place] "It takes me about 10 minutes to make it around, and I walk around it twice."    Medical History:   Past Medical History:   Diagnosis Date    Hypercholesteremia     Hypertension     Vitamin D deficiency        Surgical History:   Amilcar Phillip  has a past surgical history that includes Colonoscopy and Hernia repair.    Medications:   Amilcar has a current medication list which includes the following prescription(s): acetaminophen, cholecalciferol (vitamin d3), donepezil, ibuprofen, indomethacin, magnesium hydroxide 400 mg/5 ml, multivit-min/fa/lycopen/lutein, oxcarbazepine, pravastatin, risperidone, sertraline, and valsartan.    Allergies:   Review of patient's allergies indicates:  No Known Allergies     Imaging: N/A    Prior Therapy: The patient has not had recent PT.  Social History:  Mr. Phillip lives in an adult [facility] senior [facility] that is a single story facility with no " "stairs.    Occupation: Retired   Prior Level of Function: The patient reports having been walking with a SC for over a year.  Current Level of Function: The patient reports that he lives alone in his room and that he is able to physically perform desired activities including walking.    Pain:  Current 0/10, worst 4/10, best 0/10   Location: bilateral low back   Description: Aching  Aggravating Factors: Bending and Night Time  Easing Factors: rest  "It's just low back pain from getting older. I have had it for years. It comes and goes." PT acknowledges.    Pts goals: "to keep getting around like I am."    Objective     Posture: Sits without distress. No abnormalities noted.    Mental status: The patient is alert and oriented this day.      Posture:     Standing: Thoracic kyphosis noted with slight scoliotic curve. No distress noted.    Transfers:     Transfers Level of Assistance  Comments   Supine to Sit "Ind.    Sit to Supine "Ind.    Sit to Stand Dominguez.    Stand to Sit Dominguez.    Transfer from bed to chair Dominguez.    Car transfer Dominguez.      Balance:    Static sitting balance:Good  Dynamic sitting balance: Good    Static standing Balance: Good  Dynamic standing balance: Good        Strength:  MMT scores are age adjusted    RLE Strength Grade LLE Strength Grade   Hip Flexion 4+/5 Hip Flexion 4+/5   Hip Extension 4+/5 Hip Extension 4+/5   Hip Abduction 4+/5 Hip Abduction 4+/5   Hip Adduction 4+/5 Hip Adduction 4+/5   Hip Internal Rotation 4+/5 Hip Internal Rotation 4+/5   Hip External Rotation 4+/5 Hip External Rotation 4/5   Knee Flexion 4+/5 Knee Flexion 4+/5   Knee Extension 4+/5 Knee Extension 4+/5   Ankle Dorsiflexion 5/5 Ankle Dorsiflexion 5/5   Ankle Plantarflexion 5/5 Ankle Plantarflexion 5/5   Ankle Inversion 4+/5 Ankle Inversion 4+/5   Ankle Eversion 4+/5 Ankle Eversion 4+/5   Glut Max 4/5 Glut Max 4/5       Proprioception:     Joint  Right Left Comments   Great Toe  normal. normal.    Ankle  normal. " normal.    Knee normal. normal.    Elbow  normal. normal.    Thumb  normal. normal.          UMN Signs:   Clonus: (-)  Babinski: (-)    Ambulation:  Assistance: SBA during the evaluation.  AD: Patient ambulates on level surfaces with a SC and good balance.  Deviations: No deviations noted.    Stairs:   Assistance: CGA as patient ascended and descended 6 steps. Patient performed with a step to gait pattern both ascending and descending and using handrails for one UE and SC in the other UE  AD: SC used  Deviations: Patient uses step to pattern which he feels safe with.      Outcome Measures:     Evaluation   Timed Up and Go 19 sec  < 20 sec safe for independent transfers, < 30 sec safe for dependent transfers/assist required         TREATMENT   Treatment Time In: n/a  Treatment Time Out: n/a  Total Treatment time separate from Evaluation: n/a minutes    Education provided:   - Patient was educated the importance of maintaining his current physical abilities including his daily walking regime. The patient was verbally compliant and without questions.    Written Home Exercises Provided: Patient instructed in continuing with his current walking program..  Exercises were reviewed and Amilcar was able to demonstrate them prior to the end of the session.  Amilcar demonstrated good  understanding of the education provided.     See EMR under Patient Instructions for exercises provided in future POCs if needed.      Time In: 2:00 pm  Time Out: 2:45 pm  Total Billable Time: 45 minutes      Assessment     Amilcar is a 74 y.o. male presenting to OP Physical Therapy for initial evaluation on 7/10/2020 with a MD Dx of gait disturbance. Patient exhibits no recent decline in physical abilities including ambulation and transfer abilities. Based upon the findings of this PT evaluation, the patient exhibits no recent decline in his physical ability and is not limited at this time and is currently in no need for skilled physical therapy.  PT will be glad to re-assess in the future if any decline in current status is noted. The patient's clinical characteristics are currently stable. Patient would not benefit from skilled Physical Therapy at this time as there are no noted deficits to address.    Pt prognosis is Good.   Pt will benefit from skilled outpatient Physical Therapy to address the deficits stated above and in the chart below, provide pt/family education, and to maximize pt's level of independence.     Plan of care discussed with patient: Yes. The patient agreed that PT was not needed at this time.  Pt's spiritual, cultural and educational needs considered and patient is agreeable to the plan of care and goals as stated below:       Goals:   No STGs or LTGs established as the patient was not needing skilled PT at this time and was an evaluation only.      Anticipated Barriers for therapy: n/a    Medical Necessity is demonstrated by the following  History  Co-morbidities and personal factors that may impact the plan of care Co-morbidities:   advanced age and HTN    Personal Factors:   age  lifestyle     low   Examination  Body Structures and Functions, activity limitations and participation restrictions that may impact the plan of care Body Regions:   gait    Body Systems:    gross symmetry  balance  gait    Participation Restrictions:   No restrictions noted.    Activity limitations:   Learning and applying knowledge  solving problems    General Tasks and Commands  no deficits    Communication  no deficits    Mobility  walking  moving around using equipment (WC)    Self care  no deficits    Domestic Life  no deficits    Interactions/Relationships  basic interpersonal interactions    Life Areas  no deficits    Community and Social Life  recreation and leisure         low   Clinical Presentation stable and uncomplicated low   Decision Making/ Complexity Score: low         Plan       POC valid from 7/10/2020 through 07/10/2020. Patient was for  an evaluation only as further skilled PT was not needed at this time.      John Tyson, PT        I CERTIFY THE NEED FOR THESE SERVICES FURNISHED UNDER THIS PLAN OF TREATMENT AND WHILE UNDER MY CARE     Physician's comments:           Physician's Signature: ___________________________________________________

## 2020-07-13 ENCOUNTER — TELEPHONE (OUTPATIENT)
Dept: REHABILITATION | Facility: HOSPITAL | Age: 75
End: 2020-07-13

## 2020-07-13 NOTE — TELEPHONE ENCOUNTER
Re-attempted to reach Patient's sister, Leia Barrientos, per her request to discuss POC.  Sister reported she would have to call back d/t caring for her .    Patty Mata, MS CCC/SLP

## 2020-07-13 NOTE — PLAN OF CARE
Outpatient Neurological Rehabilitation  Speech and Language Therapy Evaluation    Date: 7/10/2020     Name: Amilcar Phillip   MRN: [id number]      Therapy Diagnosis:   Encounter Diagnosis   Name Primary?    Cognitive communication deficit        Referring Physician: Lindsey Felix NP  F/U Physician:  To be confirmed  Physician Orders: Speech Therapy Eval and Treat  Medical Diagnosis from Referral: Memory deficit    Date of Evaluation:  7/10/2020  Insurance Authorization Period: 06/29/2020 to 12/31/2020  Visit #/Visits authorized: 1/ 104  Plan of Care Certification Period:   07/10/2020 to 08/28/2020     Time In: 1209  Time Out: 1302    Procedure Min.   Cognitive Communication Evaluation  53          Precautions:Standard and COVID-19    Subjective     Date of Onset: 06/29/2020  History of Current Condition:   Patient reports forgetting things that are talked about.  He reports his sister takes care of his appointments and he has a calendar that she writes on.  He reports just moving here in December 2019, from [facility].     Past Medical History: Aimlcar Phillip  has a past medical history of Hypercholesteremia, Hypertension, and Vitamin D deficiency.  Amilcar Phillip  has a past surgical history that includes Colonoscopy and Hernia repair.    Medical Hx and Allergies:  Patient has a current medication list which includes the following prescription(s): acetaminophen, cholecalciferol (vitamin d3), donepezil, ibuprofen, indomethacin, magnesium hydroxide 400 mg/5 ml, multivit-min/fa/lycopen/lutein, oxcarbazepine, pravastatin, risperidone, sertraline, and valsartan. Review of patient's allergies indicates:  No Known Allergies    Relevant Imaging:  n/a    Prior Therapy:  n/a  Social History:  Patient is a retired [employer].  He reports his wife passed away ~ 4 years ago and his daughter ~ year and a half ago.  He lives in an assisted living facility.  Prior Level of Function:  IND; living [place]   Current Level of Function: Patient demonstrates decreased comprehension of functional directions, decreased functional recall.  He reports not being able to drive because he loses direction.  Pain Scale:  3/10 on VAS currently.   Pain Location: reports constant lower left back pain  Nutrition:  Reports eating well; gaining weight since admission to [facility].  Chart review reveals difficulty swallowing, food/liquid spilling out of mouth during meals  Patient's Therapy Goals:  Patient reports to participate in conversation    Objective     [place] Cognitive Assessment (MoCA) score (adjusted for education level) -   16 of  30, indicating a moderate/severe cognitive-linguistic impairment.  Patient scored 0/5 for delayed recall subtest and 3/6 for attention subtest.  Patient exhibited decreased vocal intensity and wet vocal quality throughout assessment.      Treatment     Treatment Time In: n/a  Treatment Time Out: n/a  Total Treatment Time: n/a  No treatment performed 2/2 time to administer evaluation    Education: Plan of Care and role of SLP in care.  Patient expressed understanding.  Message left with sister Leia Barrientos at [phone] to discuss plan of care per her request.      Home Program:  To be established    Assessment     Amilcar Phillip is a 74 y.o. male referred to outpatient SpeechTherapy with a medical diagnosis of Memory deficit.  Patient presents with a moderate/severe cognitive impairment characterized by decreased functional recall, decreased attention and visuospatial skills.  Suspect swallowing deficits based on documentation and clinical observation.  Positive prognostic factors include supportive family.  Barriers to therapy include level of severity, potential for new learning.  Patient will benefit from skilled outpatient neurological rehabilitation speech therapy to address stated Cognition deficits, to educate Patient and family, and to participate in a home exercise  program as recommended.  Recommend swallow evaluation.    Rehab Potential: good  Patient's spiritual, cultural and educational needs considered.  Patient agreeable to plan of care and goals.    Short Term Goals (3 weeks):   1.  Patient will recall and state 2 internal and external memory strategies  2.  Consistent, reliable external recall system will be implemented  3.  Cognitive assessment will be completed    Long Term Goals (10 weeks):   Patient will demonstrate use of a reliable external recall system at least 3x/week for functional recall with SB assist    Plan     Plan of Care Certification: 7/10/2020  to 08/28/2020  Recommended Treatment Plan:  2 times per week for 3 weeks, then 1 time per week for 4 weeks: Patient Education, Home Exercise Program, Compensatory Strategies and Cognitive Re-training    Other Recommendations: Clinical Swallow Evaluation    Therapist's Name:   Patty Mata MS CCC/SLP  Date: 7/10/2020       I CERTIFY THE NEED FOR THESE SERVICES FURNISHED UNDER THIS PLAN OF TREATMENT AND WHILE UNDER MY CARE    Physician's comments:        Physician's Signature: ___________________________________________________

## 2020-07-14 PROBLEM — R26.9 GAIT DISTURBANCE: Status: RESOLVED | Noted: 2020-07-10 | Resolved: 2020-07-14

## 2020-07-20 ENCOUNTER — LAB VISIT (OUTPATIENT)
Dept: LAB | Facility: OTHER | Age: 75
End: 2020-07-20
Attending: INTERNAL MEDICINE
Payer: MEDICARE

## 2020-07-20 ENCOUNTER — CLINICAL SUPPORT (OUTPATIENT)
Dept: REHABILITATION | Facility: HOSPITAL | Age: 75
End: 2020-07-20
Payer: MEDICARE

## 2020-07-20 DIAGNOSIS — R41.841 COGNITIVE COMMUNICATION DEFICIT: ICD-10-CM

## 2020-07-20 DIAGNOSIS — Z11.59 SPECIAL SCREENING EXAMINATION FOR UNSPECIFIED VIRAL DISEASE: Primary | ICD-10-CM

## 2020-07-20 PROCEDURE — 92507 TX SP LANG VOICE COMM INDIV: CPT

## 2020-07-20 PROCEDURE — U0003 INFECTIOUS AGENT DETECTION BY NUCLEIC ACID (DNA OR RNA); SEVERE ACUTE RESPIRATORY SYNDROME CORONAVIRUS 2 (SARS-COV-2) (CORONAVIRUS DISEASE [COVID-19]), AMPLIFIED PROBE TECHNIQUE, MAKING USE OF HIGH THROUGHPUT TECHNOLOGIES AS DESCRIBED BY CMS-2020-01-R: HCPCS

## 2020-07-20 NOTE — PROGRESS NOTES
Outpatient Neurological Rehabilitation   Speech and Language Therapy Daily Note  Date:  7/20/2020     Name: Amilcar Phillip   MRN: [id number]     Therapy Diagnosis:   Encounter Diagnosis   Name Primary?    Cognitive communication deficit        Referring Physician: Lindsey Felix NP  F/U Physician:  Lindsey Felix NP  Physician Orders: Speech Therapy Eval and Treat  Medical Diagnosis from Referral: Memory deficit     Date of Evaluation:  7/10/2020  Insurance Authorization Period: 06/29/2020 to 12/31/2020  Visit #/Visits authorized: 2/ 104  Plan of Care Certification Period:   07/10/2020 to 08/28/2020     Visits Canceled: 0  Visits No Show: 0    Time In:  1001  Time Out:  1051  Total Billable Time:  50    Precautions:  Standard and COVID-19    Subjective:     Patient reports: no complaints  Home exercise program to be established.     Response to previous treatment: no complaints   Pain Scale:  0/10 on VAS currently.   Pain Location: n/a    Objective:       UNTIMED  Procedure Min.   {Speech- Language- Voice Therapy  50           Total Timed Units: n/a  Total Untimed Units: 1  Charges Billed/# of units: 1    1.  Patient will recall and state 2 internal and external memory strategies  2.  Consistent, reliable external recall system will be implemented  3.  Cognitive assessment will be completed    Assessment:     Spoke with Patient's sister on 07/16, regarding POC and Patient/family goals.  Sister reported concern that [facility] is recommending lower level of care d/t him going outside without supervision.  Addressed functional recall of safety precautions with Patient.  Patient reports staff person is supposed to be at the patio door to watch whoever is outside, but if no one is there he goes on out himself.  Patient required MAX assist for functional problem solving alternatives to going outside without supervision.  Functional recall strategies introduced.  Cognitive assessment ongoing with writing assessment  for use of writing as a recall strategy completed today.  MOD I for word and sentence level written expression.  MOD assist for paragraph level written expression.    Patient is participating well in tx toward current goals.  Current goals remain appropriate. Goals to be updated as necessary.     Patient prognosis is Good.  He will continue to benefit from skilled outpatient speech and language therapy to address the deficits listed at initial evaluation, provide patient/family education and to maximize Patient's level of independence in the home and community environment.     Barriers to Therapy: level of severity, potential for new learning  Patient's spiritual, cultural and educational needs considered and Patient agreeable to plan of care and goals.    Patient Education/Response:     Decreased comprehension of functional problem solving questions v. decreased topic maintenance    Written Home Exercises Provided: to be established.    Plan:     Continue POC with focus on completion of cognitive and dysphagia assessments.      Patty Mata MS CCC/SLP  7/20/2020

## 2020-07-22 ENCOUNTER — CLINICAL SUPPORT (OUTPATIENT)
Dept: REHABILITATION | Facility: HOSPITAL | Age: 75
End: 2020-07-22
Payer: MEDICARE

## 2020-07-22 DIAGNOSIS — R41.841 COGNITIVE COMMUNICATION DEFICIT: ICD-10-CM

## 2020-07-22 PROCEDURE — 92610 EVALUATE SWALLOWING FUNCTION: CPT

## 2020-07-22 PROCEDURE — 92507 TX SP LANG VOICE COMM INDIV: CPT

## 2020-07-22 NOTE — PROGRESS NOTES
Outpatient Neurological Rehabilitation   Speech and Language Therapy Daily Note  Date:  7/22/2020     Name: Amilcar Phillip   MRN: [id number]     Therapy Diagnosis:   Encounter Diagnosis   Name Primary?    Cognitive communication deficit        Referring Physician: Lindsey Felix NP  F/U Physician:  Lindsey Felix NP  Physician Orders: Speech Therapy Eval and Treat  Medical Diagnosis from Referral: Memory deficit     Date of Evaluation:  7/10/2020  Insurance Authorization Period: 06/29/2020 to 12/31/2020  Visit #/Visits authorized: 3/ 104  Plan of Care Certification Period:   07/10/2020 to 08/28/2020     Visits Canceled: 0  Visits No Show: 0    Time In:  0958  Time Out:  1046  Total Billable Time:  48    Precautions:  Standard and COVID-19    Subjective:     Patient reports: no complaints  Home exercise program to be established.     Response to previous treatment: no complaints   Pain Scale:  0/10 on VAS currently.   Pain Location: n/a    Objective:       UNTIMED  Procedure Min.   {Speech- Language- Voice Therapy  33    Swallowing and Oral Function Evaluation  15     Total Timed Units: n/a  Total Untimed Units: 1  Charges Billed/# of units: 1    1.  Patient will recall and state 2 internal and external memory strategies  2.  Consistent, reliable external recall system will be implemented  3.  Cognitive assessment will be completed  4.  Dysphagia assessment to be completed    Assessment:     Patient demonstrated recall of plan for not taking walks alone as addressed last session.  He reports he went for a walk yesterday, and a staff member was present at the door who walked with him part of the way before she had to tend to something at a specific time.  Ongoing cognitive evaluation with Patient exhibiting orientation to person with MOD I, to place with MIN assist, and to time with MOD assist.  Dysphagia evaluation completed:    Assessment Type: Clinical Swallow Evaluation    Reason for Referral  A  Clinical Swallow Evaluation was recommended to assess the efficiency of the Patient's swallow function, rule out aspiration and make recommendations regarding safe dietary consistencies, effective compensatory strategies, and safe eating environment.  Patient denies difficulty swallowing.  He reports anterior spillage of liquids that is improved with use of a straw.    ORAL MECHANISM EXAMINATION:     Strength ROM Sensation   Labial Decreased with gross tremor Mild decrease for protrusion/retraction WFL   Lingual Moderate decrease with gross tremor WFL, but decreases with increased rate of movement WFL   Buccal Mild/moderate decrease Mild/moderate decrease WFL   Palate WFL WFL WFL   Jaw WFL WFL WFL     Alertness:  WFL  Comprehension:  WFL  Dentition:  Natural, adequate  Cough:   Reflexive:  Strong, adequate   Volitional:  Weak glottal closure  Swallow   Reflexive:  Laryngeal elevation visible and timely   Volitional:  Laryngeal elevation visible and timely    Vocal quality:  Decreased vocal intensity, intermittently wet    FUNCTIONAL SWALLOW ASSESSMENT:    Consistencies Tested:   Thin liquid, self administered via cup and straw  Pureed solid, self administered via spoon  Regular solid, self administered    Oral Phase:  WFL    Pharyngeal Phase:  Cough after continuous straw sipping    Esophageal phase:  No reported sx    Clinical signs of aspiration:  Immediate cough with continuous straw sipping of thin liquid    Compensatory techniques used during assessment:  Single sips    Impressions/Severity of dysphagia:  Mild oropharyngeal dysphagia    Diet Recommendations:  Regular solids/thin liquids/medications whole as tolerated    Treatment rationale:  to maximize functional swallow for minimal aspiration risk and adequate nutrition/hydration, establish safe diet, educate/train recommended compensatory strategies/maneuvers    Education provided re: role of SLP, results of clinical swallow evaluation, goals of therapy,  recommended solid/liquid consistencies, recommended mode of medication administration.    Patient is participating well in tx toward current goals.  Dysphagia assessment complete; goals to be updated as follows:  Add goals:  Patient will perform oral motor HEP with SB assist  Patient will state and follow compensatory swallow strategies with SB assist    Patient prognosis is Good.  He will continue to benefit from skilled outpatient speech and language therapy to address the deficits listed at initial evaluation, provide patient/family education and to maximize Patient's level of independence in the home and community environment.     Barriers to Therapy: level of severity, potential for new learning  Patient's spiritual, cultural and educational needs considered and Patient agreeable to plan of care and goals.    Patient Education/Response:     Decreased comprehension of functional problem solving questions v. decreased topic maintenance    Written Home Exercises Provided: to be established.    Plan:     Continue POC with focus on completion of cognitive assessment, implementation of oral motor HEP and use of memory strategies.      Patty Mata MS CCC/SLP  7/22/2020

## 2020-07-23 LAB — SARS-COV-2 RNA RESP QL NAA+PROBE: NEGATIVE

## 2020-07-27 ENCOUNTER — CLINICAL SUPPORT (OUTPATIENT)
Dept: REHABILITATION | Facility: HOSPITAL | Age: 75
End: 2020-07-27
Payer: MEDICARE

## 2020-07-27 DIAGNOSIS — R41.841 COGNITIVE COMMUNICATION DEFICIT: ICD-10-CM

## 2020-07-27 PROCEDURE — 92507 TX SP LANG VOICE COMM INDIV: CPT

## 2020-07-27 NOTE — PROGRESS NOTES
Outpatient [facility] Rehabilitation   Speech and Language Therapy Daily Note  Date:  7/27/2020     Name: Amilcar Phillip   MRN: [id number]     Therapy Diagnosis:   Encounter Diagnosis   Name Primary?    Cognitive communication deficit        Referring Physician: Lindsey Felix NP  F/U Physician:  Lindsey Felix NP  Physician Orders: Speech Therapy Eval and Treat  Medical Diagnosis from Referral: Memory deficit     Date of Evaluation:  7/10/2020  Insurance Authorization Period: 06/29/2020 to 12/31/2020  Visit #/Visits authorized: 4/ 104  Plan of Care Certification Period:   07/10/2020 to 08/28/2020     Visits Canceled: 0  Visits No Show: 0    Time In:  1335  Time Out:  1417  Total Billable Time:  43    Precautions:  Standard and COVID-19    Subjective:     Patient reports: no complaints  Home exercise program to be established.     Response to previous treatment: no complaints   Pain Scale:  0/10 on VAS currently.   Pain Location: n/a    Objective:       UNTIMED  Procedure Min.   Speech- Language- Voice Therapy  43          Total Timed Units: n/a  Total Untimed Units: 1  Charges Billed/# of units: 1    1.  Patient will recall and state 2 internal and external memory strategies  2.  Consistent, reliable external recall system will be implemented  3.  Cognitive assessment will be completed  4.  Patient will perform oral motor HEP with SB assist  5.  Patient will state and follow compensatory swallow strategies with SB assist    Assessment:     Ongoing cognitive evaluation as follows:    Reading comprehension:  Sentence MOD I  Paragraph Simple MOD I; Complex MIN assist    Sequencing:  DEP    Following Written Directions:  MIN assist    Problem Solving:  Moderate level DEP    Establishment of oral motor HEP initiated.    Patient is participating well in tx toward current goals.  Current goals are appropriate.  Goals to be updated as needed.    Patient prognosis is Good.  He will continue to benefit from  skilled outpatient speech and language therapy to address the deficits listed at initial evaluation, provide patient/family education and to maximize Patient's level of independence in the home and community environment.     Barriers to Therapy: level of severity, potential for new learning  Patient's spiritual, cultural and educational needs considered and Patient agreeable to plan of care and goals.    Patient Education/Response:     Decreased comprehension of functional problem solving questions v. decreased topic maintenance; decreased ability to follow functional directions v. decreased problem solving skills    Written Home Exercises Provided: yes, oral motor HEP initiated.    Plan:     Continue POC with focus on completion of cognitive assessment, implementation of oral motor HEP and use of memory strategies.      Patty Mata, MS CCC/SLP  7/27/2020

## 2020-07-29 ENCOUNTER — CLINICAL SUPPORT (OUTPATIENT)
Dept: REHABILITATION | Facility: HOSPITAL | Age: 75
End: 2020-07-29
Payer: MEDICARE

## 2020-07-29 DIAGNOSIS — R41.841 COGNITIVE COMMUNICATION DEFICIT: ICD-10-CM

## 2020-07-29 PROCEDURE — 92507 TX SP LANG VOICE COMM INDIV: CPT

## 2020-07-29 NOTE — PROGRESS NOTES
Outpatient [facility] Rehabilitation   Speech and Language Therapy Daily Note  Date:  7/29/2020     Name: Amilcar Phillip   MRN: [id number]     Therapy Diagnosis:   Encounter Diagnosis   Name Primary?    Cognitive communication deficit        Referring Physician: Lindsey Felix NP  F/U Physician:  Lindsey Felix NP  Physician Orders: Speech Therapy Eval and Treat  Medical Diagnosis from Referral: Memory deficit     Date of Evaluation:  7/10/2020  Insurance Authorization Period: 06/29/2020 to 12/31/2020  Visit #/Visits authorized: 5/ 104  Plan of Care Certification Period:   07/10/2020 to 08/28/2020     Visits Canceled: 0  Visits No Show: 0    Time In:  1342  Time Out:  1430  Total Billable Time:  48    Precautions:  Standard and COVID-19    Subjective:     Patient reports: no complaints  Home exercise program to be established.     Response to previous treatment: no complaints   Pain Scale:  0/10 on VAS currently.   Pain Location: n/a    Objective:       UNTIMED  Procedure Min.   Speech- Language- Voice Therapy  48          Total Timed Units: n/a  Total Untimed Units: 1  Charges Billed/# of units: 1    1.  Patient will recall and state 2 internal and external memory strategies  2.  Consistent, reliable external recall system will be implemented  3.  Cognitive assessment will be completed  4.  Patient will perform oral motor HEP with SB assist  5.  Patient will state and follow compensatory swallow strategies with SB assist    Assessment:     Cognitive evaluation completed as follows:    Immediate Memory:  MOD assist    Short Term Memory:  DEP    Long Term Memory:   Verbal - MOD I   Visual - MOD assist    Convergent Categorization:  SB assist    Category Exclusion:  MAX assist    Similarities and Differences:  MOD I    Patient is participating well in tx toward current goals.  Cognitive evaluation completed.  Goals to be updated as follows:  Add:  Patient will follow simple written directions with SB  assist  Patient will demonstrate verbal reasoning skills with MIN assist    Patient prognosis is Good.  He will continue to benefit from skilled outpatient speech and language therapy to address the deficits listed at initial evaluation, provide patient/family education and to maximize Patient's level of independence in the home and community environment.     Barriers to Therapy: level of severity, potential for new learning  Patient's spiritual, cultural and educational needs considered and Patient agreeable to plan of care and goals.    Patient Education/Response:     Decreased comprehension of functional problem solving questions v. decreased topic maintenance; decreased ability to follow functional directions v. decreased problem solving skills    Written Home Exercises Provided: yes, oral motor HEP initiated.    Plan:     Continue POC with focus on simple functional reasoning, implementation of oral motor HEP and use of memory strategies.      Patty Mata MS CCC/SLP  7/29/2020

## 2020-07-30 ENCOUNTER — LAB VISIT (OUTPATIENT)
Dept: LAB | Facility: OTHER | Age: 75
End: 2020-07-30
Attending: INTERNAL MEDICINE
Payer: MEDICARE

## 2020-07-30 DIAGNOSIS — Z20.822 SUSPECTED COVID-19 VIRUS INFECTION: ICD-10-CM

## 2020-07-30 DIAGNOSIS — Z03.818 ENCOUNTER FOR OBSERVATION FOR SUSPECTED EXPOSURE TO OTHER BIOLOGICAL AGENTS RULED OUT: ICD-10-CM

## 2020-07-30 PROCEDURE — U0003 INFECTIOUS AGENT DETECTION BY NUCLEIC ACID (DNA OR RNA); SEVERE ACUTE RESPIRATORY SYNDROME CORONAVIRUS 2 (SARS-COV-2) (CORONAVIRUS DISEASE [COVID-19]), AMPLIFIED PROBE TECHNIQUE, MAKING USE OF HIGH THROUGHPUT TECHNOLOGIES AS DESCRIBED BY CMS-2020-01-R: HCPCS

## 2020-08-03 LAB — SARS-COV-2 RNA RESP QL NAA+PROBE: NORMAL

## 2020-08-05 ENCOUNTER — CLINICAL SUPPORT (OUTPATIENT)
Dept: REHABILITATION | Facility: HOSPITAL | Age: 75
End: 2020-08-05
Payer: MEDICARE

## 2020-08-05 DIAGNOSIS — R41.841 COGNITIVE COMMUNICATION DEFICIT: ICD-10-CM

## 2020-08-05 PROCEDURE — 92507 TX SP LANG VOICE COMM INDIV: CPT

## 2020-08-05 NOTE — PROGRESS NOTES
Outpatient [facility]   Speech and Language Therapy Daily Note  Date:  8/5/2020     Name: Amilcar Phillip   MRN: [id number]     Therapy Diagnosis:   Encounter Diagnosis   Name Primary?    Cognitive communication deficit        Referring Physician: Lindsey Felix NP  F/U Physician:  Lindsey Felix NP  Physician Orders: Speech Therapy Eval and Treat  Medical Diagnosis from Referral: Memory deficit     Date of Evaluation:  7/10/2020  Insurance Authorization Period: 06/29/2020 to 12/31/2020  Visit #/Visits authorized: 6/ 104  Plan of Care Certification Period:   07/10/2020 to 08/28/2020     Visits Canceled: 0  Visits No Show: 0    Time In:  1528  Time Out:  1615  Total Billable Time:  47    Precautions:  Standard and COVID-19    Subjective:     Patient reports: no complaints  Home exercise program to be established.     Response to previous treatment: no complaints   Pain Scale:  0/10 on VAS currently.   Pain Location: n/a    Objective:       UNTIMED  Procedure Min.   Speech- Language- Voice Therapy  47          Total Timed Units: n/a  Total Untimed Units: 1  Charges Billed/# of units: 1    1.  Patient will recall and state 2 internal and external memory strategies  2.  Consistent, reliable external recall system will be implemented  3.  Cognitive assessment will be completed - MET  4.  Patient will perform oral motor HEP with SB assist  5.  Patient will state and follow compensatory swallow strategies with SB assist  6.  Patient will follow simple written directions with SB assist  7.  Patient will demonstrate verbal reasoning skills with MIN assist    Assessment:     Patient required MOD assist for convergent category exclusion and MOD assist for associated verbal reasoning.      Patient is participating well in tx toward current goals.  Current goals remain appropriate.  Goals to be updated as needed.    Patient prognosis is Good.  He will continue to benefit from skilled outpatient speech  and language therapy to address the deficits listed at initial evaluation, provide patient/family education and to maximize Patient's level of independence in the home and community environment.     Barriers to Therapy: level of severity, potential for new learning  Patient's spiritual, cultural and educational needs considered and Patient agreeable to plan of care and goals.    Patient Education/Response:     Decreased comprehension of functional problem solving questions v. decreased topic maintenance; decreased ability to follow functional directions v. decreased problem solving skills    Written Home Exercises Provided: yes, oral motor HEP, verbal reasoning.    Plan:     Continue POC with focus on simple functional reasoning, implementation of oral motor HEP and use of memory strategies.      Patty Mata MS CCC/SLP  8/5/2020

## 2020-08-07 ENCOUNTER — CLINICAL SUPPORT (OUTPATIENT)
Dept: REHABILITATION | Facility: HOSPITAL | Age: 75
End: 2020-08-07
Payer: MEDICARE

## 2020-08-07 DIAGNOSIS — R41.841 COGNITIVE COMMUNICATION DEFICIT: ICD-10-CM

## 2020-08-07 PROCEDURE — 92507 TX SP LANG VOICE COMM INDIV: CPT

## 2020-08-07 NOTE — PROGRESS NOTES
Outpatient [facility]   Speech and Language Therapy Daily Note  Date:  8/7/2020     Name: Amilcar Phillip   MRN: [id number]     Therapy Diagnosis:   Encounter Diagnosis   Name Primary?    Cognitive communication deficit        Referring Physician: Lindsey Felix NP  F/U Physician:  Lindsey Felix NP  Physician Orders: Speech Therapy Eval and Treat  Medical Diagnosis from Referral: Memory deficit     Date of Evaluation:  7/10/2020  Insurance Authorization Period: 06/29/2020 to 12/31/2020  Visit #/Visits authorized: 7/ 104  Plan of Care Certification Period:   07/10/2020 to 08/28/2020     Visits Canceled: 0  Visits No Show: 0    Time In:  1322  Time Out:  1412  Total Billable Time:  50    Precautions:  Standard and COVID-19    Subjective:     Patient reports: no complaints.  Patient exhibiting poor intelligibility d/t decreased vocal intensity, imprecise articulation.  Patient was compliant with HEP.     Response to previous treatment: no complaints   Pain Scale:  0/10 on VAS currently.   Pain Location: n/a    Objective:       UNTIMED  Procedure Min.   Speech- Language- Voice Therapy  50          Total Timed Units: n/a  Total Untimed Units: 1  Charges Billed/# of units: 1    1.  Patient will recall and state 2 internal and external memory strategies  2.  Consistent, reliable external recall system will be implemented  3.  Patient will perform oral motor HEP with SB assist  4.  Patient will state and follow compensatory swallow strategies with SB assist  5.  Patient will follow simple written directions with SB assist  6.  Patient will demonstrate verbal reasoning skills with MIN assist    Assessment:     Patient required MIN assist for convergent category exclusion and MOD assist for associated verbal reasoning.  Patient demonstrated stimulability for increased vocal intensity during sustained phonation and phrase level reading with verbal cue.    Patient is participating well in tx toward  current goals.  Current goals remain appropriate.  Goals to be updated as needed.    Patient prognosis is Good.  He will continue to benefit from skilled outpatient speech and language therapy to address the deficits listed at initial evaluation, provide patient/family education and to maximize Patient's level of independence in the home and community environment.     Barriers to Therapy: level of severity, potential for new learning  Patient's spiritual, cultural and educational needs considered and Patient agreeable to plan of care and goals.    Patient Education/Response:     Decreased comprehension of functional problem solving questions v. decreased topic maintenance; decreased ability to follow functional directions v. decreased problem solving skills    Written Home Exercises Provided: yes, oral motor HEP, verbal reasoning.    Plan:     Continue POC with focus on simple functional reasoning, implementation of oral motor HEP and use of memory strategies.      Patty Mata MS CCC/SLP  8/7/2020

## 2020-08-10 ENCOUNTER — CLINICAL SUPPORT (OUTPATIENT)
Dept: REHABILITATION | Facility: HOSPITAL | Age: 75
End: 2020-08-10
Payer: MEDICARE

## 2020-08-10 DIAGNOSIS — R41.841 COGNITIVE COMMUNICATION DEFICIT: ICD-10-CM

## 2020-08-10 DIAGNOSIS — R93.89 ABNORMAL RADIOLOGICAL FINDINGS IN SKIN AND SUBCUTANEOUS TISSUE: Primary | ICD-10-CM

## 2020-08-10 PROCEDURE — 92507 TX SP LANG VOICE COMM INDIV: CPT

## 2020-08-10 NOTE — PROGRESS NOTES
Outpatient [facility]   Speech and Language Therapy Daily Note  Date:  8/10/2020     Name: Amilcar Phillip   MRN: [id number]     Therapy Diagnosis:   Encounter Diagnosis   Name Primary?    Cognitive communication deficit        Referring Physician: Lindsey Felix NP  F/U Physician:  Lindsey Felix NP  Physician Orders: Speech Therapy Eval and Treat  Medical Diagnosis from Referral: Memory deficit     Date of Evaluation:  7/10/2020  Insurance Authorization Period: 06/29/2020 to 12/31/2020  Visit #/Visits authorized: 8/ 104  Plan of Care Certification Period:   07/10/2020 to 08/28/2020     Visits Canceled: 0  Visits No Show: 0    Time In:  1432  Time Out:  1518  Total Billable Time:  46    Precautions:  Standard and COVID-19    Subjective:     Patient reports: no complaints.  Patient was compliant with HEP.     Response to previous treatment: no complaints   Pain Scale:  0/10 on VAS currently.   Pain Location: n/a    Objective:       UNTIMED  Procedure Min.   Speech- Language- Voice Therapy  46          Total Timed Units: n/a  Total Untimed Units: 1  Charges Billed/# of units: 1    1.  Patient will recall and state 2 internal and external memory strategies  2.  Consistent, reliable external recall system will be implemented  3.  Patient will perform oral motor HEP with SB assist  4.  Patient will state and follow compensatory swallow strategies with SB assist  5.  Patient will follow simple written directions with SB assist  6.  Patient will demonstrate verbal reasoning skills with MIN assist    Assessment:     Patient required SB assist for convergent category exclusion and MOD assist for associated verbal reasoning.    Patient is participating well in tx toward current goals.  Current goals remain appropriate.  Goals to be updated as needed.    Patient prognosis is Good.  He will continue to benefit from skilled outpatient speech and language therapy to address the deficits listed at  initial evaluation, provide patient/family education and to maximize Patient's level of independence in the home and community environment.     Barriers to Therapy: level of severity, potential for new learning  Patient's spiritual, cultural and educational needs considered and Patient agreeable to plan of care and goals.    Patient Education/Response:     Decreased comprehension of functional problem solving questions v. decreased topic maintenance; decreased ability to follow functional directions v. decreased problem solving skills    Written Home Exercises Provided: yes, oral motor HEP, verbal reasoning.    Plan:     Continue POC with focus on simple functional reasoning, implementation of oral motor HEP and use of memory strategies.      Patty Mata MS CCC/SLP  8/10/2020

## 2020-08-12 ENCOUNTER — CLINICAL SUPPORT (OUTPATIENT)
Dept: REHABILITATION | Facility: HOSPITAL | Age: 75
End: 2020-08-12
Payer: MEDICARE

## 2020-08-12 DIAGNOSIS — R26.9 ABNORMALITY OF GAIT: Primary | ICD-10-CM

## 2020-08-12 DIAGNOSIS — R41.841 COGNITIVE COMMUNICATION DEFICIT: ICD-10-CM

## 2020-08-12 DIAGNOSIS — R93.89 ABNORMAL MRI: ICD-10-CM

## 2020-08-12 DIAGNOSIS — R41.3 MEMORY DEFICIT: ICD-10-CM

## 2020-08-12 DIAGNOSIS — E23.6 RATHKE'S POUCH CYST: ICD-10-CM

## 2020-08-12 PROCEDURE — 92507 TX SP LANG VOICE COMM INDIV: CPT

## 2020-08-12 NOTE — PROGRESS NOTES
Outpatient [facility]   Speech and Language Therapy Daily Note  Date:  8/12/2020     Name: Amilcar Phillip   MRN: [id number]     Therapy Diagnosis:   Encounter Diagnosis   Name Primary?    Cognitive communication deficit        Referring Physician: Lindsey Felix NP  F/U Physician:  Lindsey Felix NP  Physician Orders: Speech Therapy Eval and Treat  Medical Diagnosis from Referral: Memory deficit     Date of Evaluation:  7/10/2020  Insurance Authorization Period: 06/29/2020 to 12/31/2020  Visit #/Visits authorized: 9/ 104  Plan of Care Certification Period:   07/10/2020 to 08/28/2020     Visits Canceled: 0  Visits No Show: 0    Time In:  1430  Time Out:  1516  Total Billable Time:  46    Precautions:  Standard and COVID-19    Subjective:     Patient reports: no complaints.  Patient was compliant with HEP.     Response to previous treatment: no complaints   Pain Scale:  0/10 on VAS currently.   Pain Location: n/a    Objective:       UNTIMED  Procedure Min.   Speech- Language- Voice Therapy  46          Total Timed Units: n/a  Total Untimed Units: 1  Charges Billed/# of units: 1    1.  Patient will recall and state 2 internal and external memory strategies  2.  Consistent, reliable external recall system will be implemented  3.  Patient will perform oral motor HEP with SB assist  4.  Patient will state and follow compensatory swallow strategies with SB assist  5.  Patient will follow simple written directions with SB assist  6.  Patient will demonstrate verbal reasoning skills with MIN assist    Assessment:     Patient required MAX assist to transfer appointments written by his sister onto a calender as simulation of utilizing it  as an external memory aid.  He required MOD assist to perform oral motor HEP.    Patient is participating well in tx toward current goals.  Current goals remain appropriate.  Goals to be updated as needed.    Patient prognosis is Good.  He will continue to benefit  from skilled outpatient speech and language therapy to address the deficits listed at initial evaluation, provide patient/family education and to maximize Patient's level of independence in the home and community environment.     Barriers to Therapy: level of severity, potential for new learning  Patient's spiritual, cultural and educational needs considered and Patient agreeable to plan of care and goals.    Patient Education/Response:     Decreased comprehension of functional problem solving questions v. decreased topic maintenance; decreased ability to follow functional directions v. decreased problem solving skills    Written Home Exercises Provided: yes, oral motor HEP, verbal reasoning.    Plan:     Continue POC with focus on simple functional reasoning, implementation of oral motor HEP and use of memory strategies.      Patty Mata, MS CCC/SLP  8/12/2020

## 2020-08-13 DIAGNOSIS — I63.233 CEREBRAL INFARCTION DUE TO UNSPECIFIED OCCLUSION OR STENOSIS OF BILATERAL CAROTID ARTERIES: Primary | ICD-10-CM

## 2020-08-17 ENCOUNTER — CLINICAL SUPPORT (OUTPATIENT)
Dept: REHABILITATION | Facility: HOSPITAL | Age: 75
End: 2020-08-17
Payer: MEDICARE

## 2020-08-17 DIAGNOSIS — R41.841 COGNITIVE COMMUNICATION DEFICIT: ICD-10-CM

## 2020-08-17 PROCEDURE — 92507 TX SP LANG VOICE COMM INDIV: CPT

## 2020-08-17 NOTE — PROGRESS NOTES
Outpatient [facility]   Speech and Language Therapy Daily Note  Date:  8/17/2020     Name: Amilcar Phillip   MRN: [id number]     Therapy Diagnosis:   Encounter Diagnosis   Name Primary?    Cognitive communication deficit        Referring Physician: Lindsey Felix NP  F/U Physician:  Lindsey Felix NP  Physician Orders: Speech Therapy Eval and Treat  Medical Diagnosis from Referral: Memory deficit     Date of Evaluation:  7/10/2020  Insurance Authorization Period: 06/29/2020 to 12/31/2020  Visit #/Visits authorized: 10/ 104  Plan of Care Certification Period:   07/10/2020 to 08/28/2020     Visits Canceled: 0  Visits No Show: 0    Time In:  1436  Time Out:  1520  Total Billable Time:  44    Precautions:  Standard and COVID-19    Subjective:     Patient reports: no complaints.  Patient was compliant with HEP.     Response to previous treatment: no complaints   Pain Scale:  0/10 on VAS currently.   Pain Location: n/a    Objective:       UNTIMED  Procedure Min.   Speech- Language- Voice Therapy  44          Total Timed Units: n/a  Total Untimed Units: 1  Charges Billed/# of units: 1    1.  Patient will recall and state 2 internal and external memory strategies  2.  Consistent, reliable external recall system will be implemented  3.  Patient will perform oral motor HEP with SB assist  4.  Patient will state and follow compensatory swallow strategies with SB assist  5.  Patient will follow simple written directions with SB assist  6.  Patient will demonstrate verbal reasoning skills with MIN assist    Assessment:     Patient required MOD assist to perform oral motor HEP.  Baseline SPL taken d/t Patient continuing to exhibit decreased vocal intensity during running speech.  Patient exhibited an average SPL of 61 dB in conversation.    Patient is participating well in tx toward current goals.  Current goals remain appropriate.  Goals to be updated as follows:  Add goals:  Patient will achieve an  average SPL of 75 dB during sentence level reading with MIN assist.    Patient prognosis is Good.  He will continue to benefit from skilled outpatient speech and language therapy to address the deficits listed at initial evaluation, provide patient/family education and to maximize Patient's level of independence in the home and community environment.     Barriers to Therapy: level of severity, potential for new learning  Patient's spiritual, cultural and educational needs considered and Patient agreeable to plan of care and goals.    Patient Education/Response:     Decreased comprehension of functional problem solving questions v. decreased topic maintenance; decreased ability to follow functional directions v. decreased problem solving skills    Written Home Exercises Provided: yes, oral motor HEP, simple written directions, vocal intensity.    Plan:     Continue POC with focus on simple functional reasoning, implementation of oral motor HEP and use of memory strategies.      Patty Mata MS CCC/SLP  8/17/2020

## 2020-08-19 ENCOUNTER — CLINICAL SUPPORT (OUTPATIENT)
Dept: REHABILITATION | Facility: HOSPITAL | Age: 75
End: 2020-08-19
Payer: MEDICARE

## 2020-08-19 DIAGNOSIS — R93.89 ABNORMAL RADIOLOGICAL FINDINGS IN SKIN AND SUBCUTANEOUS TISSUE: Primary | ICD-10-CM

## 2020-08-19 DIAGNOSIS — R41.841 COGNITIVE COMMUNICATION DEFICIT: ICD-10-CM

## 2020-08-19 PROCEDURE — 92507 TX SP LANG VOICE COMM INDIV: CPT

## 2020-08-19 NOTE — PROGRESS NOTES
Outpatient [facility]   Speech and Language Therapy Daily Note  Date:  8/19/2020     Name: Amilcar Phillip   MRN: [id number]     Therapy Diagnosis:   Encounter Diagnosis   Name Primary?    Cognitive communication deficit        Referring Physician: Lindsey Felix NP  F/U Physician:  Lindsey Felix NP  Physician Orders: Speech Therapy Eval and Treat  Medical Diagnosis from Referral: Memory deficit     Date of Evaluation:  7/10/2020  Insurance Authorization Period: 06/29/2020 to 12/31/2020  Visit #/Visits authorized: 11/ 104  Plan of Care Certification Period:   07/10/2020 to 08/28/2020     Visits Canceled: 0  Visits No Show: 0    Time In:  1355  Time Out:  1429  Total Billable Time:  34    Precautions:  Standard and COVID-19    Subjective:     Patient reports: no complaints.  He apologized for being late.  Patient was compliant with HEP.     Response to previous treatment: no complaints   Pain Scale:  0/10 on VAS currently.   Pain Location: n/a    Objective:       UNTIMED  Procedure Min.   Speech- Language- Voice Therapy  34          Total Timed Units: n/a  Total Untimed Units: 1  Charges Billed/# of units: 1    1.  Patient will recall and state 2 internal and external memory strategies  2.  Consistent, reliable external recall system will be implemented  3.  Patient will perform oral motor HEP with SB assist  4.  Patient will state and follow compensatory swallow strategies with SB assist  5.  Patient will follow simple written directions with SB assist  6.  Patient will demonstrate verbal reasoning skills with MIN assist  7. Patient will achieve an average SPL of 75 dB during sentence level reading with MIN assist     Assessment:     Patient required SB for following simple written directions.  He completed simple verbal reasoning with MOD assist.    Patient is participating well in tx toward current goals.  Current goals remain appropriate.  Goals to be updated as needed.    Patient  prognosis is Good.  He will continue to benefit from skilled outpatient speech and language therapy to address the deficits listed at initial evaluation, provide patient/family education and to maximize Patient's level of independence in the home and community environment.     Barriers to Therapy: level of severity, potential for new learning  Patient's spiritual, cultural and educational needs considered and Patient agreeable to plan of care and goals.    Patient Education/Response:     Decreased comprehension of functional problem solving questions v. decreased topic maintenance; decreased ability to follow functional directions v. decreased problem solving skills    Written Home Exercises Provided: yes, oral motor HEP, simple written directions, vocal intensity.    Plan:     Continue POC with focus on simple functional reasoning, implementation of oral motor HEP and use of memory strategies.      Patty Mata MS CCC/SLP  8/19/2020

## 2020-08-27 RX ORDER — CHOLECALCIFEROL (VITAMIN D3) 125 MCG
5000 CAPSULE ORAL DAILY
Qty: 90 CAPSULE | Refills: 1 | Status: SHIPPED | OUTPATIENT
Start: 2020-08-27 | End: 2021-03-08

## 2020-08-31 ENCOUNTER — CLINICAL SUPPORT (OUTPATIENT)
Dept: CARDIOLOGY | Facility: HOSPITAL | Age: 75
End: 2020-08-31
Attending: NURSE PRACTITIONER
Payer: MEDICARE

## 2020-08-31 ENCOUNTER — HOSPITAL ENCOUNTER (OUTPATIENT)
Dept: RADIOLOGY | Facility: HOSPITAL | Age: 75
Discharge: HOME | End: 2020-08-31
Attending: NURSE PRACTITIONER
Payer: MEDICARE

## 2020-08-31 VITALS — HEIGHT: 67 IN | WEIGHT: 177.94 LBS | BODY MASS INDEX: 27.93 KG/M2

## 2020-08-31 DIAGNOSIS — R93.89 ABNORMAL RADIOLOGICAL FINDINGS IN SKIN AND SUBCUTANEOUS TISSUE: ICD-10-CM

## 2020-08-31 DIAGNOSIS — I63.233 CEREBRAL INFARCTION DUE TO UNSPECIFIED OCCLUSION OR STENOSIS OF BILATERAL CAROTID ARTERIES: ICD-10-CM

## 2020-08-31 LAB
AORTIC ROOT ANNULUS: 3.73 CM
AORTIC VALVE CUSP SEPERATION: 2.1 CM
AV INDEX (PROSTH): 0.94
AV MEAN GRADIENT: 4 MMHG
AV PEAK GRADIENT: 8 MMHG
AV VALVE AREA: 4.47 CM2
AV VELOCITY RATIO: 87.37
BSA FOR ECHO PROCEDURE: 1.95 M2
CV ECHO LV RWT: 0.45 CM
DOP CALC AO PEAK VEL: 1.42 M/S
DOP CALC AO VTI: 31.02 CM
DOP CALC LVOT AREA: 4.8 CM2
DOP CALC LVOT DIAMETER: 2.46 CM
DOP CALC LVOT PEAK VEL: 124.06 M/S
DOP CALC LVOT STROKE VOLUME: 138.76 CM3
DOP CALCLVOT PEAK VEL VTI: 29.21 CM
E WAVE DECELERATION TIME: 232.06 MSEC
E/A RATIO: 0.83
E/E' RATIO: 10.93 M/S
ECHO LV POSTERIOR WALL: 0.92 CM (ref 0.6–1.1)
FRACTIONAL SHORTENING: 24 % (ref 28–44)
INTERVENTRICULAR SEPTUM: 0.92 CM (ref 0.6–1.1)
IVRT: 77.35 MSEC
LEFT ATRIUM SIZE: 4.41 CM
LEFT INTERNAL DIMENSION IN SYSTOLE: 3.13 CM (ref 2.1–4)
LEFT VENTRICLE MASS INDEX: 61 G/M2
LEFT VENTRICULAR INTERNAL DIMENSION IN DIASTOLE: 4.11 CM (ref 3.5–6)
LEFT VENTRICULAR MASS: 118.09 G
LV LATERAL E/E' RATIO: 9.11 M/S
LV SEPTAL E/E' RATIO: 13.67 M/S
MV PEAK A VEL: 0.99 M/S
MV PEAK E VEL: 0.82 M/S
PISA TR MAX VEL: 2.16 M/S
PV PEAK VELOCITY: 116.36 CM/S
RA PRESSURE: 3 MMHG
RIGHT VENTRICULAR END-DIASTOLIC DIMENSION: 234 CM
TDI LATERAL: 0.09 M/S
TDI SEPTAL: 0.06 M/S
TDI: 0.08 M/S
TR MAX PG: 19 MMHG
TV REST PULMONARY ARTERY PRESSURE: 22 MMHG

## 2020-08-31 PROCEDURE — 93306 TTE W/DOPPLER COMPLETE: CPT

## 2020-08-31 PROCEDURE — 93880 EXTRACRANIAL BILAT STUDY: CPT | Mod: TC

## 2020-08-31 PROCEDURE — 93306 TTE W/DOPPLER COMPLETE: CPT | Mod: 26,,, | Performed by: SPECIALIST

## 2020-08-31 PROCEDURE — 93306 ECHO (CUPID ONLY): ICD-10-PCS | Mod: 26,,, | Performed by: SPECIALIST

## 2020-09-10 ENCOUNTER — LAB VISIT (OUTPATIENT)
Dept: LAB | Facility: OTHER | Age: 75
End: 2020-09-10
Attending: INTERNAL MEDICINE
Payer: MEDICARE

## 2020-09-10 DIAGNOSIS — Z03.818 ENCOUNTER FOR OBSERVATION FOR SUSPECTED EXPOSURE TO OTHER BIOLOGICAL AGENTS RULED OUT: ICD-10-CM

## 2020-09-10 PROCEDURE — U0003 INFECTIOUS AGENT DETECTION BY NUCLEIC ACID (DNA OR RNA); SEVERE ACUTE RESPIRATORY SYNDROME CORONAVIRUS 2 (SARS-COV-2) (CORONAVIRUS DISEASE [COVID-19]), AMPLIFIED PROBE TECHNIQUE, MAKING USE OF HIGH THROUGHPUT TECHNOLOGIES AS DESCRIBED BY CMS-2020-01-R: HCPCS

## 2020-09-12 LAB — SARS-COV-2 RNA RESP QL NAA+PROBE: NOT DETECTED

## 2020-09-22 ENCOUNTER — LAB VISIT (OUTPATIENT)
Dept: LAB | Facility: OTHER | Age: 75
End: 2020-09-22
Attending: INTERNAL MEDICINE
Payer: MEDICARE

## 2020-09-22 DIAGNOSIS — Z03.818 ENCOUNTER FOR OBSERVATION FOR SUSPECTED EXPOSURE TO OTHER BIOLOGICAL AGENTS RULED OUT: ICD-10-CM

## 2020-09-22 PROCEDURE — U0003 INFECTIOUS AGENT DETECTION BY NUCLEIC ACID (DNA OR RNA); SEVERE ACUTE RESPIRATORY SYNDROME CORONAVIRUS 2 (SARS-COV-2) (CORONAVIRUS DISEASE [COVID-19]), AMPLIFIED PROBE TECHNIQUE, MAKING USE OF HIGH THROUGHPUT TECHNOLOGIES AS DESCRIBED BY CMS-2020-01-R: HCPCS

## 2020-09-23 LAB — SARS-COV-2 RNA RESP QL NAA+PROBE: NOT DETECTED

## 2020-09-29 ENCOUNTER — LAB VISIT (OUTPATIENT)
Dept: LAB | Facility: OTHER | Age: 75
End: 2020-09-29
Payer: MEDICARE

## 2020-09-29 DIAGNOSIS — Z03.818 ENCOUNTER FOR OBSERVATION FOR SUSPECTED EXPOSURE TO OTHER BIOLOGICAL AGENTS RULED OUT: ICD-10-CM

## 2020-09-29 PROCEDURE — U0003 INFECTIOUS AGENT DETECTION BY NUCLEIC ACID (DNA OR RNA); SEVERE ACUTE RESPIRATORY SYNDROME CORONAVIRUS 2 (SARS-COV-2) (CORONAVIRUS DISEASE [COVID-19]), AMPLIFIED PROBE TECHNIQUE, MAKING USE OF HIGH THROUGHPUT TECHNOLOGIES AS DESCRIBED BY CMS-2020-01-R: HCPCS

## 2020-09-30 LAB — SARS-COV-2 RNA RESP QL NAA+PROBE: NOT DETECTED

## 2020-10-06 ENCOUNTER — LAB VISIT (OUTPATIENT)
Dept: LAB | Facility: OTHER | Age: 75
End: 2020-10-06
Attending: INTERNAL MEDICINE
Payer: MEDICARE

## 2020-10-06 DIAGNOSIS — Z03.818 ENCOUNTER FOR OBSERVATION FOR SUSPECTED EXPOSURE TO OTHER BIOLOGICAL AGENTS RULED OUT: ICD-10-CM

## 2020-10-06 PROCEDURE — U0003 INFECTIOUS AGENT DETECTION BY NUCLEIC ACID (DNA OR RNA); SEVERE ACUTE RESPIRATORY SYNDROME CORONAVIRUS 2 (SARS-COV-2) (CORONAVIRUS DISEASE [COVID-19]), AMPLIFIED PROBE TECHNIQUE, MAKING USE OF HIGH THROUGHPUT TECHNOLOGIES AS DESCRIBED BY CMS-2020-01-R: HCPCS

## 2020-10-07 LAB — SARS-COV-2 RNA RESP QL NAA+PROBE: NOT DETECTED

## 2020-10-13 ENCOUNTER — LAB VISIT (OUTPATIENT)
Dept: LAB | Facility: OTHER | Age: 75
End: 2020-10-13
Attending: INTERNAL MEDICINE
Payer: MEDICARE

## 2020-10-13 DIAGNOSIS — Z03.818 ENCOUNTER FOR OBSERVATION FOR SUSPECTED EXPOSURE TO OTHER BIOLOGICAL AGENTS RULED OUT: ICD-10-CM

## 2020-10-13 PROCEDURE — U0003 INFECTIOUS AGENT DETECTION BY NUCLEIC ACID (DNA OR RNA); SEVERE ACUTE RESPIRATORY SYNDROME CORONAVIRUS 2 (SARS-COV-2) (CORONAVIRUS DISEASE [COVID-19]), AMPLIFIED PROBE TECHNIQUE, MAKING USE OF HIGH THROUGHPUT TECHNOLOGIES AS DESCRIBED BY CMS-2020-01-R: HCPCS

## 2020-10-14 LAB — SARS-COV-2 RNA RESP QL NAA+PROBE: NOT DETECTED

## 2020-10-20 ENCOUNTER — LAB VISIT (OUTPATIENT)
Dept: LAB | Facility: OTHER | Age: 75
End: 2020-10-20
Payer: MEDICARE

## 2020-10-20 DIAGNOSIS — Z03.818 ENCOUNTER FOR OBSERVATION FOR SUSPECTED EXPOSURE TO OTHER BIOLOGICAL AGENTS RULED OUT: ICD-10-CM

## 2020-10-20 PROCEDURE — U0003 INFECTIOUS AGENT DETECTION BY NUCLEIC ACID (DNA OR RNA); SEVERE ACUTE RESPIRATORY SYNDROME CORONAVIRUS 2 (SARS-COV-2) (CORONAVIRUS DISEASE [COVID-19]), AMPLIFIED PROBE TECHNIQUE, MAKING USE OF HIGH THROUGHPUT TECHNOLOGIES AS DESCRIBED BY CMS-2020-01-R: HCPCS

## 2020-10-22 LAB — SARS-COV-2 RNA RESP QL NAA+PROBE: NOT DETECTED

## 2020-12-11 ENCOUNTER — LAB VISIT (OUTPATIENT)
Dept: LAB | Facility: OTHER | Age: 75
End: 2020-12-11
Attending: INTERNAL MEDICINE
Payer: MEDICARE

## 2020-12-11 DIAGNOSIS — Z03.818 ENCOUNTER FOR OBSERVATION FOR SUSPECTED EXPOSURE TO OTHER BIOLOGICAL AGENTS RULED OUT: ICD-10-CM

## 2020-12-11 PROCEDURE — U0003 INFECTIOUS AGENT DETECTION BY NUCLEIC ACID (DNA OR RNA); SEVERE ACUTE RESPIRATORY SYNDROME CORONAVIRUS 2 (SARS-COV-2) (CORONAVIRUS DISEASE [COVID-19]), AMPLIFIED PROBE TECHNIQUE, MAKING USE OF HIGH THROUGHPUT TECHNOLOGIES AS DESCRIBED BY CMS-2020-01-R: HCPCS

## 2020-12-13 LAB — SARS-COV-2 RNA RESP QL NAA+PROBE: NOT DETECTED

## 2020-12-18 ENCOUNTER — LAB VISIT (OUTPATIENT)
Dept: LAB | Facility: OTHER | Age: 75
End: 2020-12-18
Payer: MEDICARE

## 2020-12-18 DIAGNOSIS — Z03.818 ENCOUNTER FOR OBSERVATION FOR SUSPECTED EXPOSURE TO OTHER BIOLOGICAL AGENTS RULED OUT: ICD-10-CM

## 2020-12-18 PROCEDURE — U0003 INFECTIOUS AGENT DETECTION BY NUCLEIC ACID (DNA OR RNA); SEVERE ACUTE RESPIRATORY SYNDROME CORONAVIRUS 2 (SARS-COV-2) (CORONAVIRUS DISEASE [COVID-19]), AMPLIFIED PROBE TECHNIQUE, MAKING USE OF HIGH THROUGHPUT TECHNOLOGIES AS DESCRIBED BY CMS-2020-01-R: HCPCS

## 2020-12-20 LAB — SARS-COV-2 RNA RESP QL NAA+PROBE: NOT DETECTED

## 2020-12-28 ENCOUNTER — LAB VISIT (OUTPATIENT)
Dept: LAB | Facility: OTHER | Age: 75
End: 2020-12-28
Payer: MEDICARE

## 2020-12-28 DIAGNOSIS — Z03.818 ENCOUNTER FOR OBSERVATION FOR SUSPECTED EXPOSURE TO OTHER BIOLOGICAL AGENTS RULED OUT: ICD-10-CM

## 2020-12-28 PROCEDURE — U0003 INFECTIOUS AGENT DETECTION BY NUCLEIC ACID (DNA OR RNA); SEVERE ACUTE RESPIRATORY SYNDROME CORONAVIRUS 2 (SARS-COV-2) (CORONAVIRUS DISEASE [COVID-19]), AMPLIFIED PROBE TECHNIQUE, MAKING USE OF HIGH THROUGHPUT TECHNOLOGIES AS DESCRIBED BY CMS-2020-01-R: HCPCS

## 2020-12-30 LAB — SARS-COV-2 RNA RESP QL NAA+PROBE: NOT DETECTED

## 2021-01-05 ENCOUNTER — LAB VISIT (OUTPATIENT)
Dept: LAB | Facility: OTHER | Age: 76
End: 2021-01-05
Payer: MEDICARE

## 2021-01-05 DIAGNOSIS — Z03.818 ENCOUNTER FOR OBSERVATION FOR SUSPECTED EXPOSURE TO OTHER BIOLOGICAL AGENTS RULED OUT: ICD-10-CM

## 2021-01-05 PROCEDURE — U0003 INFECTIOUS AGENT DETECTION BY NUCLEIC ACID (DNA OR RNA); SEVERE ACUTE RESPIRATORY SYNDROME CORONAVIRUS 2 (SARS-COV-2) (CORONAVIRUS DISEASE [COVID-19]), AMPLIFIED PROBE TECHNIQUE, MAKING USE OF HIGH THROUGHPUT TECHNOLOGIES AS DESCRIBED BY CMS-2020-01-R: HCPCS

## 2021-01-07 ENCOUNTER — TELEPHONE (OUTPATIENT)
Dept: CARDIOLOGY | Facility: CLINIC | Age: 76
End: 2021-01-07

## 2021-01-07 LAB — SARS-COV-2 RNA RESP QL NAA+PROBE: NOT DETECTED

## 2021-01-08 ENCOUNTER — OFFICE VISIT (OUTPATIENT)
Dept: FAMILY MEDICINE | Facility: CLINIC | Age: 76
End: 2021-01-08
Payer: MEDICARE

## 2021-01-08 VITALS
HEIGHT: 67 IN | TEMPERATURE: 98 F | RESPIRATION RATE: 96 BRPM | WEIGHT: 189 LBS | DIASTOLIC BLOOD PRESSURE: 64 MMHG | SYSTOLIC BLOOD PRESSURE: 104 MMHG | HEART RATE: 69 BPM | BODY MASS INDEX: 29.66 KG/M2

## 2021-01-08 DIAGNOSIS — R29.898 WEAKNESS OF EXTREMITY: ICD-10-CM

## 2021-01-08 DIAGNOSIS — F03.91 DEMENTIA WITH BEHAVIORAL DISTURBANCE, UNSPECIFIED DEMENTIA TYPE: ICD-10-CM

## 2021-01-08 DIAGNOSIS — R26.9 GAIT DISTURBANCE: ICD-10-CM

## 2021-01-08 DIAGNOSIS — E55.9 VITAMIN D DEFICIENCY: ICD-10-CM

## 2021-01-08 DIAGNOSIS — H61.23 BILATERAL IMPACTED CERUMEN: ICD-10-CM

## 2021-01-08 DIAGNOSIS — I10 ESSENTIAL HYPERTENSION: Primary | ICD-10-CM

## 2021-01-08 DIAGNOSIS — E78.00 HYPERCHOLESTEREMIA: ICD-10-CM

## 2021-01-08 PROBLEM — H52.00 HYPEROPIA: Status: ACTIVE | Noted: 2021-01-08

## 2021-01-08 PROBLEM — H25.10 NUCLEAR SENILE CATARACT: Status: ACTIVE | Noted: 2021-01-08

## 2021-01-08 PROBLEM — H52.4 PRESBYOPIA: Status: ACTIVE | Noted: 2021-01-08

## 2021-01-08 PROCEDURE — 99214 PR OFFICE/OUTPT VISIT, EST, LEVL IV, 30-39 MIN: ICD-10-PCS | Mod: S$GLB,,, | Performed by: INTERNAL MEDICINE

## 2021-01-08 PROCEDURE — 99214 OFFICE O/P EST MOD 30 MIN: CPT | Mod: S$GLB,,, | Performed by: INTERNAL MEDICINE

## 2021-01-08 RX ORDER — SERTRALINE HYDROCHLORIDE 100 MG/1
50 TABLET, FILM COATED ORAL NIGHTLY
COMMUNITY
Start: 2020-12-14 | End: 2021-04-12

## 2021-01-08 RX ORDER — INFLUENZA A VIRUS A/MICHIGAN/45/2015 X-275 (H1N1) ANTIGEN (FORMALDEHYDE INACTIVATED), INFLUENZA A VIRUS A/SINGAPORE/INFIMH-16-0019/2016 IVR-186 (H3N2) ANTIGEN (FORMALDEHYDE INACTIVATED), INFLUENZA B VIRUS B/PHUKET/3073/2013 ANTIGEN (FORMALDEHYDE INACTIVATED), AND INFLUENZA B VIRUS B/MARYLAND/15/2016 BX-69A ANTIGEN (FORMALDEHYDE INACTIVATED) 60; 60; 60; 60 UG/.7ML; UG/.7ML; UG/.7ML; UG/.7ML
INJECTION, SUSPENSION INTRAMUSCULAR
COMMUNITY
Start: 2020-10-01 | End: 2021-01-08 | Stop reason: ALTCHOICE

## 2021-01-11 ENCOUNTER — OFFICE VISIT (OUTPATIENT)
Dept: FAMILY MEDICINE | Facility: CLINIC | Age: 76
End: 2021-01-11
Payer: MEDICARE

## 2021-01-11 VITALS
TEMPERATURE: 98 F | HEART RATE: 72 BPM | DIASTOLIC BLOOD PRESSURE: 70 MMHG | HEIGHT: 67 IN | OXYGEN SATURATION: 98 % | WEIGHT: 186 LBS | SYSTOLIC BLOOD PRESSURE: 100 MMHG | BODY MASS INDEX: 29.19 KG/M2

## 2021-01-11 DIAGNOSIS — H61.23 BILATERAL IMPACTED CERUMEN: Primary | ICD-10-CM

## 2021-01-11 LAB
TB INDURATION - 48 HR READ: 0 MM
TB INDURATION - 72 HR READ: 0 MM
TB SKIN TEST - 48 HR READ: NEGATIVE
TB SKIN TEST - 72 HR READ: NEGATIVE

## 2021-01-11 PROCEDURE — 86580 TB INTRADERMAL TEST: CPT | Mod: S$GLB,,, | Performed by: INTERNAL MEDICINE

## 2021-01-11 PROCEDURE — 69209 REMOVE IMPACTED EAR WAX UNI: CPT | Mod: 50,S$GLB,, | Performed by: INTERNAL MEDICINE

## 2021-01-11 PROCEDURE — 86580 POCT TB SKIN TEST: ICD-10-PCS | Mod: S$GLB,,, | Performed by: INTERNAL MEDICINE

## 2021-01-11 PROCEDURE — 69209 EAR CERUMEN REMOVAL: ICD-10-PCS | Mod: 50,S$GLB,, | Performed by: INTERNAL MEDICINE

## 2021-01-13 ENCOUNTER — LAB VISIT (OUTPATIENT)
Dept: LAB | Facility: OTHER | Age: 76
End: 2021-01-13
Payer: MEDICARE

## 2021-01-13 DIAGNOSIS — Z20.822 ENCOUNTER FOR LABORATORY TESTING FOR COVID-19 VIRUS: ICD-10-CM

## 2021-01-13 PROCEDURE — U0003 INFECTIOUS AGENT DETECTION BY NUCLEIC ACID (DNA OR RNA); SEVERE ACUTE RESPIRATORY SYNDROME CORONAVIRUS 2 (SARS-COV-2) (CORONAVIRUS DISEASE [COVID-19]), AMPLIFIED PROBE TECHNIQUE, MAKING USE OF HIGH THROUGHPUT TECHNOLOGIES AS DESCRIBED BY CMS-2020-01-R: HCPCS

## 2021-01-15 LAB — SARS-COV-2 RNA RESP QL NAA+PROBE: NOT DETECTED

## 2021-01-20 ENCOUNTER — LAB VISIT (OUTPATIENT)
Dept: LAB | Facility: OTHER | Age: 76
End: 2021-01-20
Payer: MEDICARE

## 2021-01-20 DIAGNOSIS — Z20.822 ENCOUNTER FOR LABORATORY TESTING FOR COVID-19 VIRUS: ICD-10-CM

## 2021-01-20 PROCEDURE — U0003 INFECTIOUS AGENT DETECTION BY NUCLEIC ACID (DNA OR RNA); SEVERE ACUTE RESPIRATORY SYNDROME CORONAVIRUS 2 (SARS-COV-2) (CORONAVIRUS DISEASE [COVID-19]), AMPLIFIED PROBE TECHNIQUE, MAKING USE OF HIGH THROUGHPUT TECHNOLOGIES AS DESCRIBED BY CMS-2020-01-R: HCPCS

## 2021-01-22 LAB — SARS-COV-2 RNA RESP QL NAA+PROBE: NOT DETECTED

## 2021-01-27 ENCOUNTER — LAB VISIT (OUTPATIENT)
Dept: LAB | Facility: OTHER | Age: 76
End: 2021-01-27
Payer: MEDICARE

## 2021-01-27 ENCOUNTER — EXTERNAL HOME HEALTH (OUTPATIENT)
Dept: HOME HEALTH SERVICES | Facility: HOSPITAL | Age: 76
End: 2021-01-27

## 2021-01-27 DIAGNOSIS — Z20.822 ENCOUNTER FOR LABORATORY TESTING FOR COVID-19 VIRUS: ICD-10-CM

## 2021-01-27 PROCEDURE — U0003 INFECTIOUS AGENT DETECTION BY NUCLEIC ACID (DNA OR RNA); SEVERE ACUTE RESPIRATORY SYNDROME CORONAVIRUS 2 (SARS-COV-2) (CORONAVIRUS DISEASE [COVID-19]), AMPLIFIED PROBE TECHNIQUE, MAKING USE OF HIGH THROUGHPUT TECHNOLOGIES AS DESCRIBED BY CMS-2020-01-R: HCPCS

## 2021-01-29 LAB — SARS-COV-2 RNA RESP QL NAA+PROBE: NOT DETECTED

## 2021-02-03 ENCOUNTER — LAB VISIT (OUTPATIENT)
Dept: LAB | Facility: OTHER | Age: 76
End: 2021-02-03
Payer: MEDICARE

## 2021-02-03 DIAGNOSIS — Z20.822 ENCOUNTER FOR LABORATORY TESTING FOR COVID-19 VIRUS: ICD-10-CM

## 2021-02-03 PROCEDURE — U0003 INFECTIOUS AGENT DETECTION BY NUCLEIC ACID (DNA OR RNA); SEVERE ACUTE RESPIRATORY SYNDROME CORONAVIRUS 2 (SARS-COV-2) (CORONAVIRUS DISEASE [COVID-19]), AMPLIFIED PROBE TECHNIQUE, MAKING USE OF HIGH THROUGHPUT TECHNOLOGIES AS DESCRIBED BY CMS-2020-01-R: HCPCS

## 2021-02-04 ENCOUNTER — TELEPHONE (OUTPATIENT)
Dept: CARDIOLOGY | Facility: CLINIC | Age: 76
End: 2021-02-04

## 2021-02-04 LAB — SARS-COV-2 RNA RESP QL NAA+PROBE: NOT DETECTED

## 2021-02-10 ENCOUNTER — LAB VISIT (OUTPATIENT)
Dept: LAB | Facility: OTHER | Age: 76
End: 2021-02-10
Payer: MEDICARE

## 2021-02-10 DIAGNOSIS — Z20.822 ENCOUNTER FOR LABORATORY TESTING FOR COVID-19 VIRUS: ICD-10-CM

## 2021-02-10 PROCEDURE — U0003 INFECTIOUS AGENT DETECTION BY NUCLEIC ACID (DNA OR RNA); SEVERE ACUTE RESPIRATORY SYNDROME CORONAVIRUS 2 (SARS-COV-2) (CORONAVIRUS DISEASE [COVID-19]), AMPLIFIED PROBE TECHNIQUE, MAKING USE OF HIGH THROUGHPUT TECHNOLOGIES AS DESCRIBED BY CMS-2020-01-R: HCPCS

## 2021-02-11 LAB — SARS-COV-2 RNA RESP QL NAA+PROBE: NOT DETECTED

## 2021-03-10 ENCOUNTER — DOCUMENT SCAN (OUTPATIENT)
Dept: HOME HEALTH SERVICES | Facility: HOSPITAL | Age: 76
End: 2021-03-10

## 2021-08-24 LAB
25(OH)D3 SERPL-MCNC: 92 NG/ML (ref 30–100)
ALBUMIN SERPL-MCNC: 4.4 G/DL (ref 3.6–5.1)
ALBUMIN/GLOB SERPL: 1.6 (CALC) (ref 1–2.5)
ALP SERPL-CCNC: 63 U/L (ref 35–144)
ALT SERPL-CCNC: 15 U/L (ref 9–46)
APPEARANCE UR: CLEAR
AST SERPL-CCNC: 15 U/L (ref 10–35)
BASOPHILS # BLD AUTO: 43 CELLS/UL (ref 0–200)
BASOPHILS NFR BLD AUTO: 0.5 %
BILIRUB SERPL-MCNC: 0.6 MG/DL (ref 0.2–1.2)
BILIRUB UR QL STRIP: NEGATIVE
BUN SERPL-MCNC: 16 MG/DL (ref 7–25)
BUN/CREAT SERPL: NORMAL (CALC) (ref 6–22)
CALCIUM SERPL-MCNC: 9.5 MG/DL (ref 8.6–10.3)
CHLORIDE SERPL-SCNC: 105 MMOL/L (ref 98–110)
CHOLEST SERPL-MCNC: 239 MG/DL
CHOLEST/HDLC SERPL: 5.6 (CALC)
CO2 SERPL-SCNC: 27 MMOL/L (ref 20–32)
COLOR UR: NORMAL
CREAT SERPL-MCNC: 0.88 MG/DL (ref 0.7–1.18)
EOSINOPHIL # BLD AUTO: 232 CELLS/UL (ref 15–500)
EOSINOPHIL NFR BLD AUTO: 2.7 %
ERYTHROCYTE [DISTWIDTH] IN BLOOD BY AUTOMATED COUNT: 13 % (ref 11–15)
GLOBULIN SER CALC-MCNC: 2.7 G/DL (CALC) (ref 1.9–3.7)
GLUCOSE SERPL-MCNC: 81 MG/DL (ref 65–99)
GLUCOSE UR QL STRIP: NEGATIVE
HCT VFR BLD AUTO: 40.2 % (ref 38.5–50)
HDLC SERPL-MCNC: 43 MG/DL
HGB BLD-MCNC: 13.8 G/DL (ref 13.2–17.1)
HGB UR QL STRIP: NEGATIVE
KETONES UR QL STRIP: NEGATIVE
LDLC SERPL CALC-MCNC: 171 MG/DL (CALC)
LEUKOCYTE ESTERASE UR QL STRIP: NEGATIVE
LYMPHOCYTES # BLD AUTO: 2047 CELLS/UL (ref 850–3900)
LYMPHOCYTES NFR BLD AUTO: 23.8 %
MCH RBC QN AUTO: 31.4 PG (ref 27–33)
MCHC RBC AUTO-ENTMCNC: 34.3 G/DL (ref 32–36)
MCV RBC AUTO: 91.4 FL (ref 80–100)
MONOCYTES # BLD AUTO: 894 CELLS/UL (ref 200–950)
MONOCYTES NFR BLD AUTO: 10.4 %
NEUTROPHILS # BLD AUTO: 5384 CELLS/UL (ref 1500–7800)
NEUTROPHILS NFR BLD AUTO: 62.6 %
NITRITE UR QL STRIP: NEGATIVE
NONHDLC SERPL-MCNC: 196 MG/DL (CALC)
PH UR STRIP: 7.5 [PH] (ref 5–8)
PLATELET # BLD AUTO: 266 THOUSAND/UL (ref 140–400)
PMV BLD REES-ECKER: 10.5 FL (ref 7.5–12.5)
POTASSIUM SERPL-SCNC: 4.5 MMOL/L (ref 3.5–5.3)
PROT SERPL-MCNC: 7.1 G/DL (ref 6.1–8.1)
PROT UR QL STRIP: NEGATIVE
RBC # BLD AUTO: 4.4 MILLION/UL (ref 4.2–5.8)
SODIUM SERPL-SCNC: 139 MMOL/L (ref 135–146)
SP GR UR STRIP: 1.02 (ref 1–1.03)
TRIGL SERPL-MCNC: 121 MG/DL
WBC # BLD AUTO: 8.6 THOUSAND/UL (ref 3.8–10.8)

## 2021-08-26 ENCOUNTER — OFFICE VISIT (OUTPATIENT)
Dept: FAMILY MEDICINE | Facility: CLINIC | Age: 76
End: 2021-08-26
Payer: MEDICARE

## 2021-08-26 VITALS
SYSTOLIC BLOOD PRESSURE: 100 MMHG | HEIGHT: 67 IN | WEIGHT: 180 LBS | DIASTOLIC BLOOD PRESSURE: 64 MMHG | TEMPERATURE: 97 F | BODY MASS INDEX: 28.25 KG/M2 | OXYGEN SATURATION: 97 % | HEART RATE: 69 BPM

## 2021-08-26 DIAGNOSIS — F03.91 DEMENTIA WITH BEHAVIORAL DISTURBANCE, UNSPECIFIED DEMENTIA TYPE: ICD-10-CM

## 2021-08-26 DIAGNOSIS — E78.00 HYPERCHOLESTEREMIA: ICD-10-CM

## 2021-08-26 DIAGNOSIS — I10 ESSENTIAL HYPERTENSION: Primary | ICD-10-CM

## 2021-08-26 DIAGNOSIS — M17.0 ARTHRITIS OF BOTH KNEES: ICD-10-CM

## 2021-08-26 PROCEDURE — 99214 PR OFFICE/OUTPT VISIT, EST, LEVL IV, 30-39 MIN: ICD-10-PCS | Mod: S$GLB,,, | Performed by: INTERNAL MEDICINE

## 2021-08-26 PROCEDURE — 99214 OFFICE O/P EST MOD 30 MIN: CPT | Mod: S$GLB,,, | Performed by: INTERNAL MEDICINE

## 2021-08-26 RX ORDER — DONEPEZIL HYDROCHLORIDE 5 MG/1
5 TABLET, FILM COATED ORAL EVERY MORNING
Qty: 90 TABLET | Refills: 1 | Status: SHIPPED | OUTPATIENT
Start: 2021-08-26 | End: 2021-12-23 | Stop reason: SDUPTHER

## 2021-08-26 RX ORDER — OXCARBAZEPINE 150 MG/1
TABLET, FILM COATED ORAL
Qty: 180 TABLET | Refills: 1 | Status: SHIPPED | OUTPATIENT
Start: 2021-08-26 | End: 2022-02-21

## 2021-08-26 RX ORDER — SERTRALINE HYDROCHLORIDE 100 MG/1
50 TABLET, FILM COATED ORAL NIGHTLY
Qty: 90 TABLET | Refills: 1 | Status: SHIPPED | OUTPATIENT
Start: 2021-08-26 | End: 2022-08-15

## 2021-08-26 RX ORDER — ROSUVASTATIN CALCIUM 10 MG/1
10 TABLET, COATED ORAL DAILY
Qty: 90 TABLET | Refills: 1 | Status: SHIPPED | OUTPATIENT
Start: 2021-08-26 | End: 2022-02-21

## 2021-08-26 RX ORDER — INDOMETHACIN 50 MG/1
50 CAPSULE ORAL 2 TIMES DAILY PRN
Status: CANCELLED | OUTPATIENT
Start: 2021-08-26

## 2021-08-26 RX ORDER — MELOXICAM 15 MG/1
15 TABLET ORAL DAILY
Qty: 90 TABLET | Refills: 1 | Status: SHIPPED | OUTPATIENT
Start: 2021-08-26 | End: 2022-01-24

## 2021-08-26 RX ORDER — VALSARTAN 40 MG/1
40 TABLET ORAL DAILY
Qty: 90 TABLET | Refills: 1 | Status: SHIPPED | OUTPATIENT
Start: 2021-08-26 | End: 2021-12-23 | Stop reason: HOSPADM

## 2021-08-26 RX ORDER — PRAVASTATIN SODIUM 10 MG/1
10 TABLET ORAL DAILY
Qty: 90 TABLET | Refills: 0 | Status: CANCELLED | OUTPATIENT
Start: 2021-08-26

## 2021-08-26 RX ORDER — RISPERIDONE 0.25 MG/1
0.25 TABLET ORAL NIGHTLY
Qty: 90 TABLET | Refills: 1 | Status: SHIPPED | OUTPATIENT
Start: 2021-08-26 | End: 2022-02-21

## 2021-10-04 ENCOUNTER — PATIENT MESSAGE (OUTPATIENT)
Dept: FAMILY MEDICINE | Facility: CLINIC | Age: 76
End: 2021-10-04

## 2021-10-04 DIAGNOSIS — M17.0 ARTHRITIS OF BOTH KNEES: Primary | ICD-10-CM

## 2021-10-04 RX ORDER — TRAMADOL HYDROCHLORIDE 50 MG/1
50 TABLET ORAL
Qty: 30 TABLET | Refills: 0 | Status: ON HOLD | OUTPATIENT
Start: 2021-10-04 | End: 2022-07-22 | Stop reason: HOSPADM

## 2021-10-21 ENCOUNTER — OFFICE VISIT (OUTPATIENT)
Dept: ORTHOPEDICS | Facility: CLINIC | Age: 76
End: 2021-10-21
Payer: MEDICARE

## 2021-10-21 VITALS — HEIGHT: 67 IN | BODY MASS INDEX: 28.25 KG/M2 | WEIGHT: 180 LBS

## 2021-10-21 DIAGNOSIS — M23.203 OTHER OLD TEAR OF MEDIAL MENISCUS OF RIGHT KNEE: Primary | ICD-10-CM

## 2021-10-21 DIAGNOSIS — M23.204 OTHER OLD TEAR OF MEDIAL MENISCUS OF LEFT KNEE: ICD-10-CM

## 2021-10-21 PROCEDURE — 20610 LARGE JOINT ASPIRATION/INJECTION: R KNEE: ICD-10-PCS | Mod: RT,S$GLB,, | Performed by: ORTHOPAEDIC SURGERY

## 2021-10-21 PROCEDURE — 99203 PR OFFICE/OUTPT VISIT, NEW, LEVL III, 30-44 MIN: ICD-10-PCS | Mod: 25,S$GLB,, | Performed by: ORTHOPAEDIC SURGERY

## 2021-10-21 PROCEDURE — 20610 DRAIN/INJ JOINT/BURSA W/O US: CPT | Mod: RT,S$GLB,, | Performed by: ORTHOPAEDIC SURGERY

## 2021-10-21 PROCEDURE — 99203 OFFICE O/P NEW LOW 30 MIN: CPT | Mod: 25,S$GLB,, | Performed by: ORTHOPAEDIC SURGERY

## 2021-10-21 RX ORDER — TRIAMCINOLONE ACETONIDE 40 MG/ML
40 INJECTION, SUSPENSION INTRA-ARTICULAR; INTRAMUSCULAR
Status: DISCONTINUED | OUTPATIENT
Start: 2021-10-21 | End: 2021-10-21 | Stop reason: HOSPADM

## 2021-10-21 RX ADMIN — TRIAMCINOLONE ACETONIDE 40 MG: 40 INJECTION, SUSPENSION INTRA-ARTICULAR; INTRAMUSCULAR at 02:10

## 2021-12-21 ENCOUNTER — OFFICE VISIT (OUTPATIENT)
Dept: ORTHOPEDICS | Facility: CLINIC | Age: 76
End: 2021-12-21
Payer: MEDICARE

## 2021-12-21 VITALS — HEIGHT: 67 IN | WEIGHT: 180 LBS | BODY MASS INDEX: 28.25 KG/M2 | HEART RATE: 78 BPM

## 2021-12-21 DIAGNOSIS — M23.203 DEGENERATIVE TEAR OF MEDIAL MENISCUS OF RIGHT KNEE: Primary | ICD-10-CM

## 2021-12-21 PROCEDURE — 99213 OFFICE O/P EST LOW 20 MIN: CPT | Mod: S$GLB,,, | Performed by: ORTHOPAEDIC SURGERY

## 2021-12-21 PROCEDURE — 99213 PR OFFICE/OUTPT VISIT, EST, LEVL III, 20-29 MIN: ICD-10-PCS | Mod: S$GLB,,, | Performed by: ORTHOPAEDIC SURGERY

## 2021-12-21 RX ORDER — PRAVASTATIN SODIUM 10 MG/1
TABLET ORAL
COMMUNITY
Start: 2021-07-30 | End: 2021-12-23 | Stop reason: ALTCHOICE

## 2021-12-21 RX ORDER — INDOMETHACIN 50 MG/1
CAPSULE ORAL
COMMUNITY
End: 2022-02-18 | Stop reason: ALTCHOICE

## 2021-12-22 ENCOUNTER — TELEPHONE (OUTPATIENT)
Dept: FAMILY MEDICINE | Facility: CLINIC | Age: 76
End: 2021-12-22
Payer: MEDICARE

## 2021-12-22 DIAGNOSIS — R26.9 GAIT DISTURBANCE: ICD-10-CM
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
## 2021-12-22 DIAGNOSIS — I10 ESSENTIAL HYPERTENSION: ICD-10-CM

## 2021-12-23 ENCOUNTER — OFFICE VISIT (OUTPATIENT)
Dept: FAMILY MEDICINE | Facility: CLINIC | Age: 76
End: 2021-12-23
Payer: MEDICARE

## 2021-12-23 VITALS
TEMPERATURE: 97 F | HEART RATE: 75 BPM | OXYGEN SATURATION: 97 % | HEIGHT: 67 IN | DIASTOLIC BLOOD PRESSURE: 58 MMHG | BODY MASS INDEX: 26.21 KG/M2 | SYSTOLIC BLOOD PRESSURE: 86 MMHG | WEIGHT: 167 LBS

## 2021-12-23 DIAGNOSIS — I10 PRIMARY HYPERTENSION: Primary | ICD-10-CM

## 2021-12-23 DIAGNOSIS — R82.90 ABNORMAL URINALYSIS: ICD-10-CM

## 2021-12-23 DIAGNOSIS — F03.91 DEMENTIA WITH BEHAVIORAL DISTURBANCE, UNSPECIFIED DEMENTIA TYPE: ICD-10-CM

## 2021-12-23 PROBLEM — G31.84 MILD COGNITIVE IMPAIRMENT: Status: ACTIVE | Noted: 2021-12-23

## 2021-12-23 PROBLEM — I51.7 LEFT ATRIAL ENLARGEMENT: Status: ACTIVE | Noted: 2021-12-23

## 2021-12-23 PROBLEM — E23.7 DISORDER OF PITUITARY GLAND: Status: ACTIVE | Noted: 2021-12-23

## 2021-12-23 PROBLEM — I63.233: Status: ACTIVE | Noted: 2021-12-23

## 2021-12-23 LAB
BILIRUB UR QL STRIP: POSITIVE
GLUCOSE UR QL STRIP: POSITIVE
KETONES UR QL STRIP: POSITIVE
LEUKOCYTE ESTERASE UR QL STRIP: NEGATIVE
PH, POC UA: 5
POC BLOOD, URINE: NEGATIVE
POC NITRATES, URINE: NEGATIVE
PROT UR QL STRIP: POSITIVE
SP GR UR STRIP: 1.02 (ref 1–1.03)
UROBILINOGEN UR STRIP-ACNC: 1 (ref 0.3–2.2)

## 2021-12-23 PROCEDURE — 81003 URINALYSIS AUTO W/O SCOPE: CPT | Mod: QW,S$GLB,, | Performed by: INTERNAL MEDICINE

## 2021-12-23 PROCEDURE — 99214 OFFICE O/P EST MOD 30 MIN: CPT | Mod: S$GLB,,, | Performed by: INTERNAL MEDICINE

## 2021-12-23 PROCEDURE — 81003 POCT URINALYSIS, DIPSTICK, AUTOMATED, W/O SCOPE: ICD-10-PCS | Mod: QW,S$GLB,, | Performed by: INTERNAL MEDICINE

## 2021-12-23 PROCEDURE — 99214 PR OFFICE/OUTPT VISIT, EST, LEVL IV, 30-39 MIN: ICD-10-PCS | Mod: S$GLB,,, | Performed by: INTERNAL MEDICINE

## 2021-12-23 RX ORDER — DONEPEZIL HYDROCHLORIDE 10 MG/1
10 TABLET, FILM COATED ORAL NIGHTLY
Qty: 90 TABLET | Refills: 1 | Status: CANCELLED | OUTPATIENT
Start: 2021-12-23

## 2021-12-23 RX ORDER — DONEPEZIL HYDROCHLORIDE 10 MG/1
10 TABLET, FILM COATED ORAL NIGHTLY
Qty: 90 TABLET | Refills: 1 | Status: SHIPPED | OUTPATIENT
Start: 2021-12-23 | End: 2022-08-15

## 2021-12-26 LAB
BACTERIA UR CULT: NORMAL
BACTERIA UR CULT: NORMAL

## 2022-01-06 ENCOUNTER — HOSPITAL ENCOUNTER (OUTPATIENT)
Dept: RADIOLOGY | Facility: HOSPITAL | Age: 77
Discharge: HOME | End: 2022-01-06
Attending: ORTHOPAEDIC SURGERY
Payer: MEDICARE

## 2022-01-06 DIAGNOSIS — M23.203 DEGENERATIVE TEAR OF MEDIAL MENISCUS OF RIGHT KNEE: ICD-10-CM

## 2022-01-06 PROCEDURE — 73721 MRI JNT OF LWR EXTRE W/O DYE: CPT | Mod: TC,PO,RT

## 2022-01-11 ENCOUNTER — OFFICE VISIT (OUTPATIENT)
Dept: ORTHOPEDICS | Facility: CLINIC | Age: 77
End: 2022-01-11
Payer: MEDICARE

## 2022-01-11 VITALS
BODY MASS INDEX: 26.21 KG/M2 | SYSTOLIC BLOOD PRESSURE: 112 MMHG | WEIGHT: 167 LBS | DIASTOLIC BLOOD PRESSURE: 62 MMHG | HEIGHT: 67 IN

## 2022-01-11 DIAGNOSIS — M17.11 PRIMARY OSTEOARTHRITIS OF RIGHT KNEE: Primary | ICD-10-CM

## 2022-01-11 PROCEDURE — 99213 OFFICE O/P EST LOW 20 MIN: CPT | Mod: 25,S$GLB,, | Performed by: ORTHOPAEDIC SURGERY

## 2022-01-11 PROCEDURE — 20610 LARGE JOINT ASPIRATION/INJECTION: R KNEE: ICD-10-PCS | Mod: RT,S$GLB,, | Performed by: ORTHOPAEDIC SURGERY

## 2022-01-11 PROCEDURE — 99213 PR OFFICE/OUTPT VISIT, EST, LEVL III, 20-29 MIN: ICD-10-PCS | Mod: 25,S$GLB,, | Performed by: ORTHOPAEDIC SURGERY

## 2022-01-11 PROCEDURE — 20610 DRAIN/INJ JOINT/BURSA W/O US: CPT | Mod: RT,S$GLB,, | Performed by: ORTHOPAEDIC SURGERY

## 2022-01-11 RX ORDER — TRIAMCINOLONE ACETONIDE 40 MG/ML
40 INJECTION, SUSPENSION INTRA-ARTICULAR; INTRAMUSCULAR
Status: DISCONTINUED | OUTPATIENT
Start: 2022-01-11 | End: 2022-01-11 | Stop reason: HOSPADM

## 2022-01-11 RX ADMIN — TRIAMCINOLONE ACETONIDE 40 MG: 40 INJECTION, SUSPENSION INTRA-ARTICULAR; INTRAMUSCULAR at 01:01

## 2022-01-11 NOTE — PROCEDURES
Large Joint Aspiration/Injection: R knee    Date/Time: 1/11/2022 1:15 PM  Performed by: Lionel Rodriguez MD  Authorized by: Lionel Rodriguez MD     Consent Done?:  Yes (Verbal)  Indications:  Pain  Site marked: the procedure site was marked    Timeout: prior to procedure the correct patient, procedure, and site was verified    Prep: patient was prepped and draped in usual sterile fashion      Local anesthesia used?: Yes    Local anesthetic:  Lidocaine 1% without epinephrine  Ultrasonic Guidance for needle placement?: No    Location:  Knee  Site:  R knee  Medications:  40 mg triamcinolone acetonide 40 mg/mL  Patient tolerance:  Patient tolerated the procedure well with no immediate complications

## 2022-01-11 NOTE — PROGRESS NOTES
[facility]    Subjective:     Chief Complaint:   Chief Complaint   Patient presents with    Right Knee - Pain     Pt is here with Right knee pain x 8 months from a fall and MRI results       Past Medical History:   Diagnosis Date    Hypercholesteremia     Hypertension     Vitamin D deficiency        Past Surgical History:   Procedure Laterality Date    COLONOSCOPY      HERNIA REPAIR         Current Outpatient Medications   Medication Sig    acetaminophen (TYLENOL) 650 MG TbSR Take 1 tablet (650 mg total) by mouth every 8 (eight) hours. PRN FEVER    cholecalciferol, vitamin D3, 125 mcg (5,000 unit) capsule TAKE ONE CAPSULE BY MOUTH DAILY    donepeziL (ARICEPT) 10 MG tablet Take 1 tablet (10 mg total) by mouth every evening.    ibuprofen (ADVIL,MOTRIN) 200 MG tablet Take 400 mg by mouth every 6 (six) hours as needed for Pain.    magnesium hydroxide 400 mg/5 ml (MILK OF MAGNESIA) 400 mg/5 mL Susp Take 30 mLs by mouth daily as needed.    meloxicam (MOBIC) 15 MG tablet Take 1 tablet (15 mg total) by mouth once daily.    multivit-min/FA/lycopen/lutein (CENTRUM SILVER MEN ORAL) Take 1 tablet by mouth once daily.    OXcarbazepine (TRILEPTAL) 150 MG Tab TAKE ONE TABLET BY MOUTH EVERY MORNING and TAKE ONE TABLET BY MOUTH EVERY EVENING    risperiDONE (RISPERDAL) 0.25 MG Tab Take 1 tablet (0.25 mg total) by mouth every evening.    rosuvastatin (CRESTOR) 10 MG tablet Take 1 tablet (10 mg total) by mouth once daily.    sertraline (ZOLOFT) 100 MG tablet Take 0.5 tablets (50 mg total) by mouth every evening.    traMADoL (ULTRAM) 50 mg tablet Take 1 tablet (50 mg total) by mouth every 4 to 6 hours as needed for Pain.    indomethacin (INDOCIN) 50 MG capsule 1 capsule with food or milk     No current facility-administered medications for this visit.       Review of patient's allergies indicates:  No Known Allergies    Family History   Problem Relation Age of Onset    Osteoporosis Mother     Vaginal  cancer Mother     Stroke Father        Social History     Socioeconomic History    Marital status:    Occupational History    Occupation: retired   Tobacco Use    Smoking status: Former Smoker     Types: Cigarettes, Cigars     Quit date:      Years since quittin.0    Smokeless tobacco: Never Used   Substance and Sexual Activity    Alcohol use: Yes    Drug use: Never    Sexual activity: Not Currently   Social History Narrative    Live in assistive living center       History of present illness:  Patient comes in today for the right knee.  He continues complain of significant right knee pain.  He has had an MRI.  He is here again with his sister who is his caretaker.      Review of Systems:    Constitution: Negative for chills, fever, and sweats.  Negative for unexplained weight loss.    HENT:  Negative for headaches and blurry vision.    Cardiovascular:Negative for chest pain or irregular heart beat. Negative for hypertension.    Respiratory:  Negative for cough and shortness of breath.    Gastrointestinal: Negative for abdominal pain, heartburn, melena, nausea, and vomitting.    Genitourinary:  Negative bladder incontinence and dysuria.    Musculoskeletal:  See HPI for details.     Neurological: Negative for numbness.    Psychiatric/Behavioral: Negative for depression.  The patient is not nervous/anxious.      Endocrine: Negative for polyuria    Hematologic/Lymphatic: Negative for bleeding problem.  Does not bruise/bleed easily.    Skin: Negative for poor would healing and rash    Objective:      Physical Examination:    Vital Signs:    Vitals:    22 1344   BP: 112/62       Body mass index is 26.16 kg/m².    This a well-developed, well nourished patient in no acute distress.  They are alert and oriented and cooperative to examination.        Patient has range of motion 0-115 degrees.  The knee is stable to varus valgus stresses.  He has 1+ effusion.  Pertinent New Results:  MRI of the  right knee is centrally demonstrates advanced arthritis of the right knee  XRAY Report / Interpretation:       Assessment/Plan:      Severe arthritis right knee.  I injected the right knee with Kenalog and lidocaine.  We spoke about knee replacement.  His sister had lots of questions about potentially doing a knee replacement.  I would like to make sure that we attempt all conservative measures 1st.  I explained that to them.  That will follow-up in 6 weeks      This note was created using Dragon voice recognition software that occasionally misinterpreted phrases or words.

## 2022-02-22 ENCOUNTER — OFFICE VISIT (OUTPATIENT)
Dept: ORTHOPEDICS | Facility: CLINIC | Age: 77
End: 2022-02-22
Payer: MEDICARE

## 2022-02-22 VITALS
WEIGHT: 167 LBS | BODY MASS INDEX: 26.21 KG/M2 | HEIGHT: 67 IN | SYSTOLIC BLOOD PRESSURE: 114 MMHG | DIASTOLIC BLOOD PRESSURE: 66 MMHG

## 2022-02-22 DIAGNOSIS — M17.11 PRIMARY OSTEOARTHRITIS OF RIGHT KNEE: Primary | ICD-10-CM

## 2022-02-22 PROCEDURE — 20610 DRAIN/INJ JOINT/BURSA W/O US: CPT | Mod: RT,S$GLB,, | Performed by: ORTHOPAEDIC SURGERY

## 2022-02-22 PROCEDURE — 20610 LARGE JOINT ASPIRATION/INJECTION: R KNEE: ICD-10-PCS | Mod: RT,S$GLB,, | Performed by: ORTHOPAEDIC SURGERY

## 2022-02-22 PROCEDURE — 99499 NO LOS: ICD-10-PCS | Mod: S$GLB,,, | Performed by: ORTHOPAEDIC SURGERY

## 2022-02-22 PROCEDURE — 99499 UNLISTED E&M SERVICE: CPT | Mod: S$GLB,,, | Performed by: ORTHOPAEDIC SURGERY

## 2022-02-22 NOTE — PROGRESS NOTES
[facility]    Subjective:     Chief Complaint:   Chief Complaint   Patient presents with    Right Knee - Pain     Right knee pain. Received inj 01/11/22 which helped slightly, but pain has returned as more of an aching pain.       Past Medical History:   Diagnosis Date    Hypercholesteremia     Hypertension     Vitamin D deficiency        Past Surgical History:   Procedure Laterality Date    COLONOSCOPY      HERNIA REPAIR         Current Outpatient Medications   Medication Sig    cholecalciferol, vitamin D3, 125 mcg (5,000 unit) capsule TAKE ONE CAPSULE BY MOUTH DAILY    donepeziL (ARICEPT) 10 MG tablet Take 1 tablet (10 mg total) by mouth every evening.    magnesium hydroxide 400 mg/5 ml (MILK OF MAGNESIA) 400 mg/5 mL Susp Take 30 mLs by mouth daily as needed.    meloxicam (MOBIC) 15 MG tablet TAKE ONE TABLET BY MOUTH EVERY DAY    multivit-min/FA/lycopen/lutein (CENTRUM SILVER MEN ORAL) Take 1 tablet by mouth once daily.    OXcarbazepine (TRILEPTAL) 150 MG Tab TAKE ONE TABLET BY MOUTH EVERY MORNING and TAKE ONE TABLET BY MOUTH EVERY EVENING    risperiDONE (RISPERDAL) 0.25 MG Tab TAKE ONE TABLET BY MOUTH EVERY EVENING    rosuvastatin (CRESTOR) 10 MG tablet TAKE ONE TABLET BY MOUTH EVERY DAY    sertraline (ZOLOFT) 100 MG tablet Take 0.5 tablets (50 mg total) by mouth every evening.    traMADoL (ULTRAM) 50 mg tablet Take 1 tablet (50 mg total) by mouth every 4 to 6 hours as needed for Pain.    acetaminophen (TYLENOL) 650 MG TbSR Take 1 tablet (650 mg total) by mouth every 8 (eight) hours. PRN FEVER (Patient not taking: Reported on 2/22/2022)     No current facility-administered medications for this visit.       Review of patient's allergies indicates:  No Known Allergies    Family History   Problem Relation Age of Onset    Osteoporosis Mother     Vaginal cancer Mother     Stroke Father        Social History     Socioeconomic History    Marital status:    Occupational History     Occupation: retired   Tobacco Use    Smoking status: Former Smoker     Types: Cigarettes, Cigars     Quit date:      Years since quittin.1    Smokeless tobacco: Never Used   Substance and Sexual Activity    Alcohol use: Yes    Drug use: Never    Sexual activity: Not Currently   Social History Narrative    Live in assistive living center       History of present illness:  Mr. Fitzgerald comes in today for follow-up for his right knee osteoarthritis.  He was last seen and injected in his right knee approximately 1 month ago with minimal relief of his symptoms.  This was his 2nd corticosteroid injection.  He did have an MRI which did reveal moderate-severe osteoarthritis in the medial and patellofemoral compartments of the right knee.    Review of Systems:    Constitution: Negative for chills, fever, and sweats.  Negative for unexplained weight loss.    HENT:  Negative for headaches and blurry vision.    Cardiovascular:Negative for chest pain or irregular heart beat. Negative for hypertension.    Respiratory:  Negative for cough and shortness of breath.    Gastrointestinal: Negative for abdominal pain, heartburn, melena, nausea, and vomitting.    Genitourinary:  Negative bladder incontinence and dysuria.    Musculoskeletal:  See HPI for details.     Neurological: Negative for numbness.    Psychiatric/Behavioral: Negative for depression.  The patient is not nervous/anxious.      Endocrine: Negative for polyuria    Hematologic/Lymphatic: Negative for bleeding problem.  Does not bruise/bleed easily.    Skin: Negative for poor would healing and rash    Objective:      Physical Examination:    Vital Signs:    Vitals:    22 1311   BP: 114/66       Body mass index is 26.16 kg/m².    This a well-developed, well nourished patient in no acute distress.  They are alert and oriented and cooperative to examination.        Right knee exam:  Skin right knee is clean dry and intact.  There is no erythema or  "ecchymosis.  There are no signs or symptoms of infection.  Patient is neurovascularly intact throughout the right lower extremity.  Right calf is soft and nontender.  Right knee active range of motion approximately 0-115 degrees.  His right knee is stable to varus and valgus stresses while in extension.  He can weight bear as tolerated right lower extremity but has an antalgic gait and ambulates via a rolling walker.  He does have diffuse crepitus with range of motion of the right knee.  He is diffusely tender along the medial aspect of the right knee.    Pertinent New Results:    XRAY Report / Interpretation:   No new images were taken in the office today.    Assessment/Plan:      1.  Right knee osteoarthritis.    Patient just had a recent corticosteroid injection is too soon to repeat.  Patient and his daughter are not interested in proceeding with surgical intervention at this time.  Therefore, we will administer Synvisc-One hyaluronic acid viscosupplementation injection to his right knee today.  I anesthetized the anterior lateral portal to his right knee with 1% lidocaine plain.  I then injected the Synvisc-One via the same portal.  He tolerated this well.  We will see him back in 3 months to see how he is doing with this additional treatment modality.    Mekhi Gibson, Physician Assistant, served in the capacity as a "scribe" for this patient encounter.  A "face-to-face" encounter occurred with Dr. Lionel Rodriguez on this date.  The treatment plan and medical decision-making is outlined above. Patient was seen and examined with a chaperone.       This note was created using Dragon voice recognition software that occasionally misinterpreted phrases or words.          "

## 2022-02-22 NOTE — PROCEDURES
Large Joint Aspiration/Injection: R knee    Date/Time: 2/22/2022 1:15 PM  Performed by: Lionel Rodriguez MD  Authorized by: Lionel Rodriguez MD     Consent Done?:  Yes (Verbal)  Indications:  Pain  Site marked: the procedure site was marked    Timeout: prior to procedure the correct patient, procedure, and site was verified    Prep: patient was prepped and draped in usual sterile fashion      Local anesthesia used?: Yes    Local anesthetic:  Lidocaine 1% without epinephrine    Details:  Needle Size:  20 G  Ultrasonic Guidance for needle placement?: No    Location:  Knee  Site:  R knee  Medications:  48 mg hylan g-f 20 48 mg/6 mL  Patient tolerance:  Patient tolerated the procedure well with no immediate complications

## 2022-02-23 ENCOUNTER — PATIENT OUTREACH (OUTPATIENT)
Dept: HOME HEALTH SERVICES | Facility: HOSPITAL | Age: 77
End: 2022-02-23
Payer: MEDICARE

## 2022-02-23 ENCOUNTER — EXTERNAL HOME HEALTH (OUTPATIENT)
Dept: HOME HEALTH SERVICES | Facility: HOSPITAL | Age: 77
End: 2022-02-23
Payer: MEDICARE

## 2022-03-29 ENCOUNTER — OFFICE VISIT (OUTPATIENT)
Dept: FAMILY MEDICINE | Facility: CLINIC | Age: 77
End: 2022-03-29
Payer: MEDICARE

## 2022-03-29 VITALS
HEIGHT: 67 IN | TEMPERATURE: 97 F | WEIGHT: 155 LBS | BODY MASS INDEX: 24.33 KG/M2 | HEART RATE: 68 BPM | SYSTOLIC BLOOD PRESSURE: 100 MMHG | OXYGEN SATURATION: 97 % | DIASTOLIC BLOOD PRESSURE: 56 MMHG

## 2022-03-29 DIAGNOSIS — I10 PRIMARY HYPERTENSION: Primary | ICD-10-CM

## 2022-03-29 DIAGNOSIS — F03.91 DEMENTIA WITH BEHAVIORAL DISTURBANCE, UNSPECIFIED DEMENTIA TYPE: ICD-10-CM

## 2022-03-29 PROCEDURE — 99213 PR OFFICE/OUTPT VISIT, EST, LEVL III, 20-29 MIN: ICD-10-PCS | Mod: AQ,S$GLB,, | Performed by: INTERNAL MEDICINE

## 2022-03-29 PROCEDURE — 86580 PR  TB INTRADERMAL TEST: ICD-10-PCS | Mod: S$GLB,,, | Performed by: INTERNAL MEDICINE

## 2022-03-29 PROCEDURE — 86580 TB INTRADERMAL TEST: CPT | Mod: S$GLB,,, | Performed by: INTERNAL MEDICINE

## 2022-03-29 PROCEDURE — 99213 OFFICE O/P EST LOW 20 MIN: CPT | Mod: AQ,S$GLB,, | Performed by: INTERNAL MEDICINE

## 2022-03-29 RX ORDER — DEXTROMETHORPHAN HYDROBROMIDE, GUAIFENESIN 5; 100 MG/5ML; MG/5ML
1300 LIQUID ORAL EVERY 8 HOURS
Status: ON HOLD | COMMUNITY
End: 2022-07-22 | Stop reason: HOSPADM

## 2022-03-29 NOTE — PROGRESS NOTES
Subjective:       Patient ID: Amilcar Phillip is a 76 y.o. male.    Chief Complaint: Hypertension, Hyperlipidemia (3 months follow up/Tb skin test needed), and Hemorrhoids (Patient states that he may have a hemorrhoid)    Here for follow up.  No further reports of behavioral issues.  He lives in an assisted living facility; needs PPD.      Hypertension  This is a chronic problem. The problem is controlled. Pertinent negatives include no anxiety, chest pain, headaches, malaise/fatigue, neck pain, palpitations, peripheral edema or shortness of breath. Past treatments include angiotensin blockers. There are no compliance problems.    Hyperlipidemia  This is a chronic problem. The problem is uncontrolled. Recent lipid tests were reviewed and are high. Pertinent negatives include no chest pain, myalgias or shortness of breath. Current antihyperlipidemic treatment includes statins. There are no compliance problems.  Risk factors for coronary artery disease include hypertension, male sex and dyslipidemia.     Review of Systems   Constitutional: Negative for activity change, appetite change, chills, diaphoresis, fatigue, fever, malaise/fatigue and unexpected weight change.   HENT: Negative for congestion, ear discharge, ear pain, hearing loss, nosebleeds, postnasal drip, rhinorrhea, sinus pressure, sinus pain, sneezing, sore throat, tinnitus, trouble swallowing and voice change.    Eyes: Negative for photophobia, pain, discharge, redness, itching and visual disturbance.   Respiratory: Negative for apnea, cough, choking, chest tightness, shortness of breath and wheezing.    Cardiovascular: Negative for chest pain, palpitations and leg swelling.   Gastrointestinal: Positive for blood in stool (sister reports he told her he saw blood). Negative for abdominal distention, abdominal pain, constipation, diarrhea, nausea, rectal pain (hemorrhoid) and vomiting.   Endocrine: Negative for cold intolerance, heat intolerance,  polydipsia and polyuria.        Drooling   Genitourinary: Negative for decreased urine volume, difficulty urinating, dysuria, enuresis, flank pain, frequency, genital sores, hematuria, penile discharge, penile pain, scrotal swelling, testicular pain and urgency.   Musculoskeletal: Positive for arthralgias (bilateral knee pain). Negative for back pain, gait problem, joint swelling, myalgias, neck pain and neck stiffness.   Skin: Negative for rash and wound.   Allergic/Immunologic: Negative for environmental allergies, food allergies and immunocompromised state.   Neurological: Negative for dizziness, tremors, seizures, syncope, facial asymmetry, speech difficulty, weakness, light-headedness, numbness and headaches.   Hematological: Negative for adenopathy. Does not bruise/bleed easily.   Psychiatric/Behavioral: Negative for confusion, decreased concentration, hallucinations, self-injury, sleep disturbance and suicidal ideas. The patient is not nervous/anxious.        Past Medical History:   Diagnosis Date    Hypercholesteremia     Hypertension     Vitamin D deficiency       Past Surgical History:   Procedure Laterality Date    COLONOSCOPY      HERNIA REPAIR         Family History   Problem Relation Age of Onset    Osteoporosis Mother     Vaginal cancer Mother     Stroke Father        Social History     Socioeconomic History    Marital status:    Occupational History    Occupation: retired   Tobacco Use    Smoking status: Former Smoker     Types: Cigarettes, Cigars     Quit date:      Years since quittin.2    Smokeless tobacco: Never Used   Substance and Sexual Activity    Alcohol use: Yes    Drug use: Never    Sexual activity: Not Currently   Social History Narrative    Live in assistive living center       Current Outpatient Medications   Medication Sig Dispense Refill    acetaminophen (TYLENOL) 650 MG TbSR Take 1,300 mg by mouth every 8 (eight) hours.      cholecalciferol, vitamin  "D3, 125 mcg (5,000 unit) capsule TAKE ONE CAPSULE BY MOUTH DAILY 90 capsule 1    donepeziL (ARICEPT) 10 MG tablet Take 1 tablet (10 mg total) by mouth every evening. 90 tablet 1    magnesium hydroxide 400 mg/5 ml (MILK OF MAGNESIA) 400 mg/5 mL Susp Take 30 mLs by mouth daily as needed.      meloxicam (MOBIC) 15 MG tablet TAKE ONE TABLET BY MOUTH EVERY DAY 90 tablet 1    multivit-min/FA/lycopen/lutein (CENTRUM SILVER MEN ORAL) Take 1 tablet by mouth once daily.      OXcarbazepine (TRILEPTAL) 150 MG Tab TAKE ONE TABLET BY MOUTH EVERY MORNING and TAKE ONE TABLET BY MOUTH EVERY EVENING 180 tablet 1    risperiDONE (RISPERDAL) 0.25 MG Tab TAKE ONE TABLET BY MOUTH EVERY EVENING 90 tablet 1    rosuvastatin (CRESTOR) 10 MG tablet TAKE ONE TABLET BY MOUTH EVERY DAY 90 tablet 1    sertraline (ZOLOFT) 100 MG tablet Take 0.5 tablets (50 mg total) by mouth every evening. 90 tablet 1    traMADoL (ULTRAM) 50 mg tablet Take 1 tablet (50 mg total) by mouth every 4 to 6 hours as needed for Pain. 30 tablet 0     Current Facility-Administered Medications   Medication Dose Route Frequency Provider Last Rate Last Admin    tuberculin injection 5 Units  5 Units Intradermal 1 time in Clinic/HOD Angel Edwards Jr., MD           Review of patient's allergies indicates:  No Known Allergies  Objective:    HPI     Hyperlipidemia      Additional comments: 3 months follow up  Tb skin test needed              Hemorrhoids      Additional comments: Patient states that he may have a hemorrhoid          Last edited by Tomer Sampson MA on 3/29/2022  1:59 PM. (History)      Blood pressure (!) 100/56, pulse 68, temperature 96.9 °F (36.1 °C), temperature source Temporal, height 5' 7" (1.702 m), weight 70.3 kg (155 lb), SpO2 97 %. Body mass index is 24.28 kg/m².   Physical Exam  Vitals and nursing note reviewed.   Constitutional:       General: He is not in acute distress.     Appearance: He is well-developed. He is not ill-appearing, " toxic-appearing or diaphoretic.   HENT:      Nose: Nose normal.   Eyes:      General: No scleral icterus.        Right eye: No discharge.         Left eye: No discharge.      Conjunctiva/sclera: Conjunctivae normal.   Neck:      Vascular: No carotid bruit.   Cardiovascular:      Rate and Rhythm: Normal rate and regular rhythm.      Heart sounds: Normal heart sounds. No murmur heard.  Pulmonary:      Effort: Pulmonary effort is normal. No respiratory distress.      Breath sounds: Normal breath sounds. No decreased breath sounds, wheezing, rhonchi or rales.   Abdominal:      General: There is no distension.      Palpations: Abdomen is soft.      Tenderness: There is no abdominal tenderness. There is no guarding or rebound.   Genitourinary:     Rectum: Guaiac result negative. No anal fissure or external hemorrhoid.   Musculoskeletal:      Thoracic back: Deformity (kyphosis) present.      Comments: Crepitus knees   Skin:     General: Skin is warm and dry.   Neurological:      Mental Status: He is alert.      Cranial Nerves: No facial asymmetry.      Motor: No tremor.      Gait: Gait abnormal (ambulation with cane).   Psychiatric:         Speech: Speech normal.         Behavior: Behavior normal.         Cognition and Memory: Memory is impaired.             Assessment:       1. Primary hypertension    2. Dementia with behavioral disturbance, unspecified dementia type    3. Living in assisted living        Plan:       Amilcar was seen today for hypertension, hyperlipidemia and hemorrhoids.    Diagnoses and all orders for this visit:    Primary hypertension  Comments:  No longer on meds and BP is fine    Dementia with behavioral disturbance, unspecified dementia type    Living in assisted living  Comments:  Place PPD and staff at facility will read.    Orders:  -     tuberculin injection 5 Units         Sister will ask staff to monitor for any blood in stool

## 2022-05-11 ENCOUNTER — PES CALL (OUTPATIENT)
Dept: ADMINISTRATIVE | Facility: CLINIC | Age: 77
End: 2022-05-11
Payer: MEDICARE

## 2022-06-09 ENCOUNTER — OFFICE VISIT (OUTPATIENT)
Dept: ORTHOPEDICS | Facility: CLINIC | Age: 77
End: 2022-06-09
Payer: MEDICARE

## 2022-06-09 VITALS — BODY MASS INDEX: 24.33 KG/M2 | HEIGHT: 67 IN | WEIGHT: 155 LBS

## 2022-06-09 DIAGNOSIS — M17.11 PRIMARY OSTEOARTHRITIS OF RIGHT KNEE: Primary | ICD-10-CM

## 2022-06-09 PROCEDURE — 20610 DRAIN/INJ JOINT/BURSA W/O US: CPT | Mod: RT,S$GLB,, | Performed by: ORTHOPAEDIC SURGERY

## 2022-06-09 PROCEDURE — 99213 PR OFFICE/OUTPT VISIT, EST, LEVL III, 20-29 MIN: ICD-10-PCS | Mod: 25,S$GLB,, | Performed by: ORTHOPAEDIC SURGERY

## 2022-06-09 PROCEDURE — 20610 LARGE JOINT ASPIRATION/INJECTION: R KNEE: ICD-10-PCS | Mod: RT,S$GLB,, | Performed by: ORTHOPAEDIC SURGERY

## 2022-06-09 PROCEDURE — 99213 OFFICE O/P EST LOW 20 MIN: CPT | Mod: 25,S$GLB,, | Performed by: ORTHOPAEDIC SURGERY

## 2022-06-09 RX ORDER — TRIAMCINOLONE ACETONIDE 40 MG/ML
40 INJECTION, SUSPENSION INTRA-ARTICULAR; INTRAMUSCULAR
Status: DISCONTINUED | OUTPATIENT
Start: 2022-06-09 | End: 2022-06-09 | Stop reason: HOSPADM

## 2022-06-09 RX ADMIN — TRIAMCINOLONE ACETONIDE 40 MG: 40 INJECTION, SUSPENSION INTRA-ARTICULAR; INTRAMUSCULAR at 01:06

## 2022-06-09 NOTE — PROCEDURES
Large Joint Aspiration/Injection: R knee    Date/Time: 6/9/2022 1:15 PM  Performed by: Lionel Rodriguez MD  Authorized by: Lionel Rodriguez MD     Consent Done?:  Yes (Verbal)  Indications:  Pain  Site marked: the procedure site was marked    Timeout: prior to procedure the correct patient, procedure, and site was verified    Prep: patient was prepped and draped in usual sterile fashion      Local anesthesia used?: Yes    Local anesthetic:  Lidocaine 1% without epinephrine  Ultrasonic Guidance for needle placement?: No    Location:  Knee  Site:  R knee  Medications:  40 mg triamcinolone acetonide 40 mg/mL  Patient tolerance:  Patient tolerated the procedure well with no immediate complications

## 2022-06-09 NOTE — PROGRESS NOTES
[facility]    Subjective:     Chief Complaint:   Chief Complaint   Patient presents with    Right Knee - Pain     Pt is here to f/up on Right knee Synvisc injection on 2/22/22, Injection helped but is getting pain in his Right knee when sitting at the table to eat       Past Medical History:   Diagnosis Date    Hypercholesteremia     Hypertension     Vitamin D deficiency        Past Surgical History:   Procedure Laterality Date    COLONOSCOPY      HERNIA REPAIR         Current Outpatient Medications   Medication Sig    acetaminophen (TYLENOL) 650 MG TbSR Take 1,300 mg by mouth every 8 (eight) hours.    cholecalciferol, vitamin D3, 125 mcg (5,000 unit) capsule TAKE ONE CAPSULE BY MOUTH DAILY    donepeziL (ARICEPT) 10 MG tablet Take 1 tablet (10 mg total) by mouth every evening.    magnesium hydroxide 400 mg/5 ml (MILK OF MAGNESIA) 400 mg/5 mL Susp Take 30 mLs by mouth daily as needed.    meloxicam (MOBIC) 15 MG tablet TAKE ONE TABLET BY MOUTH EVERY DAY    multivit-min/FA/lycopen/lutein (CENTRUM SILVER MEN ORAL) Take 1 tablet by mouth once daily.    OXcarbazepine (TRILEPTAL) 150 MG Tab TAKE ONE TABLET BY MOUTH EVERY MORNING and TAKE ONE TABLET BY MOUTH EVERY EVENING    risperiDONE (RISPERDAL) 0.25 MG Tab TAKE ONE TABLET BY MOUTH EVERY EVENING    rosuvastatin (CRESTOR) 10 MG tablet TAKE ONE TABLET BY MOUTH EVERY DAY    sertraline (ZOLOFT) 100 MG tablet Take 0.5 tablets (50 mg total) by mouth every evening.    traMADoL (ULTRAM) 50 mg tablet Take 1 tablet (50 mg total) by mouth every 4 to 6 hours as needed for Pain.     No current facility-administered medications for this visit.       Review of patient's allergies indicates:  No Known Allergies    Family History   Problem Relation Age of Onset    Osteoporosis Mother     Vaginal cancer Mother     Stroke Father        Social History     Socioeconomic History    Marital status:    Occupational History    Occupation: retired    Tobacco Use    Smoking status: Former Smoker     Types: Cigarettes, Cigars     Quit date: 2009     Years since quittin.4    Smokeless tobacco: Never Used   Substance and Sexual Activity    Alcohol use: Yes    Drug use: Never    Sexual activity: Not Currently   Social History Narrative    Live in assistive living center       History of present illness:  Mr. Fitzgerald comes in today for follow-up for his right knee osteoarthritis.  He was last seen and given a viscosupplementation injection in his right knee approximately 3 and half months ago with decent relief.  Unfortunately, the pain is beginning to return primarily after prolonged periods of rest.  He has had a cortisone injection, approximately 5 months ago.    Review of Systems:    Constitution: Negative for chills, fever, and sweats.  Negative for unexplained weight loss.    HENT:  Negative for headaches and blurry vision.    Cardiovascular:Negative for chest pain or irregular heart beat. Negative for hypertension.    Respiratory:  Negative for cough and shortness of breath.    Gastrointestinal: Negative for abdominal pain, heartburn, melena, nausea, and vomitting.    Genitourinary:  Negative bladder incontinence and dysuria.    Musculoskeletal:  See HPI for details.     Neurological: Negative for numbness.    Psychiatric/Behavioral: Negative for depression.  The patient is not nervous/anxious.      Endocrine: Negative for polyuria    Hematologic/Lymphatic: Negative for bleeding problem.  Does not bruise/bleed easily.    Skin: Negative for poor would healing and rash    Objective:      Physical Examination:    Vital Signs:  There were no vitals filed for this visit.    Body mass index is 24.28 kg/m².    This a well-developed, well nourished patient in no acute distress.  They are alert and oriented and cooperative to examination.        Right knee exam:  Skin to his right knee is clean dry and intact.  There is no erythema or ecchymosis.  There are  "no signs or symptoms of infection.  Patient is neurovascular intact throughout the right lower extremity.  Right calf is soft and nontender.  Patient has no effusion to the right knee.  Right knee range of motion approximately 5-110 degrees.    Pertinent New Results:    XRAY Report / Interpretation:   Three views were taken of the right knee today:  AP, lateral, and sunrise views.  They reveal no acute fractures or dislocations.  Patient has moderate arthrosis in the medial compartment of his right knee.  Visualized soft tissues are unremarkable.    Assessment/Plan:      1.  Right knee osteoarthritis.    Injected the patient's right knee today via an anterior lateral approach with 40 mg of Kenalog and lidocaine.  He tolerated this well.  We will have her follow back up with us in approximately 3 months when he is eligible for a repeat viscosupplementation injection.  Patient and his sister were in agreement with this treatment plan and verbalized understanding.    Mekhi Gibson, Physician Assistant, served in the capacity as a "scribe" for this patient encounter.  A "face-to-face" encounter occurred with Dr. Lionel Rodriguez on this date.  The treatment plan and medical decision-making is outlined above. Patient was seen and examined with a chaperone.       This note was created using Dragon voice recognition software that occasionally misinterpreted phrases or words.          "

## 2022-07-07 ENCOUNTER — HOSPITAL ENCOUNTER (INPATIENT)
Facility: HOSPITAL | Age: 77
LOS: 3 days | Discharge: NURSING, REHAB OR LONG-TERM CARE | DRG: 312 | End: 2022-07-11
Attending: EMERGENCY MEDICINE | Admitting: INTERNAL MEDICINE
Payer: MEDICARE

## 2022-07-07 DIAGNOSIS — R55 SYNCOPE, UNSPECIFIED SYNCOPE TYPE: Primary | ICD-10-CM
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
## 2022-07-07 DIAGNOSIS — M62.82 NON-TRAUMATIC RHABDOMYOLYSIS: ICD-10-CM

## 2022-07-07 DIAGNOSIS — Z74.1 REQUIRES ASSISTANCE WITH ACTIVITIES OF DAILY LIVING (ADL): ICD-10-CM

## 2022-07-07 PROBLEM — R53.81 PHYSICAL DECONDITIONING: Status: ACTIVE | Noted: 2022-07-07

## 2022-07-07 PROBLEM — T79.6XXA TRAUMATIC RHABDOMYOLYSIS: Status: ACTIVE | Noted: 2022-07-07

## 2022-07-07 LAB
ALBUMIN SERPL BCP-MCNC: 4.2 G/DL (ref 3.5–5.2)
ALP SERPL-CCNC: 74 U/L (ref 55–135)
ALT SERPL W/O P-5'-P-CCNC: 17 U/L (ref 10–44)
ANION GAP SERPL CALC-SCNC: 8 MMOL/L (ref 8–16)
AST SERPL-CCNC: 26 U/L (ref 10–40)
BASOPHILS # BLD AUTO: 0.03 K/UL (ref 0–0.2)
BASOPHILS NFR BLD: 0.2 % (ref 0–1.9)
BILIRUB SERPL-MCNC: 1.1 MG/DL (ref 0.1–1)
BILIRUB UR QL STRIP: NEGATIVE
BNP SERPL-MCNC: 78 PG/ML (ref 0–99)
BUN SERPL-MCNC: 15 MG/DL (ref 8–23)
CALCIUM SERPL-MCNC: 9.2 MG/DL (ref 8.7–10.5)
CHLORIDE SERPL-SCNC: 102 MMOL/L (ref 95–110)
CK SERPL-CCNC: 610 U/L (ref 20–200)
CLARITY UR: CLEAR
CO2 SERPL-SCNC: 28 MMOL/L (ref 23–29)
COLOR UR: YELLOW
CREAT SERPL-MCNC: 0.7 MG/DL (ref 0.5–1.4)
D DIMER PPP IA.FEU-MCNC: 0.96 UG/ML FEU
DIFFERENTIAL METHOD: ABNORMAL
EOSINOPHIL # BLD AUTO: 0 K/UL (ref 0–0.5)
EOSINOPHIL NFR BLD: 0.3 % (ref 0–8)
ERYTHROCYTE [DISTWIDTH] IN BLOOD BY AUTOMATED COUNT: 14.9 % (ref 11.5–14.5)
EST. GFR  (AFRICAN AMERICAN): >60 ML/MIN/1.73 M^2
EST. GFR  (NON AFRICAN AMERICAN): >60 ML/MIN/1.73 M^2
GLUCOSE SERPL-MCNC: 102 MG/DL (ref 70–110)
GLUCOSE UR QL STRIP: NEGATIVE
HCT VFR BLD AUTO: 32.1 % (ref 40–54)
HGB BLD-MCNC: 10.6 G/DL (ref 14–18)
HGB UR QL STRIP: NEGATIVE
IMM GRANULOCYTES # BLD AUTO: 0.04 K/UL (ref 0–0.04)
IMM GRANULOCYTES NFR BLD AUTO: 0.3 % (ref 0–0.5)
KETONES UR QL STRIP: NEGATIVE
LACTATE SERPL-SCNC: 1.1 MMOL/L (ref 0.5–1.9)
LEUKOCYTE ESTERASE UR QL STRIP: NEGATIVE
LYMPHOCYTES # BLD AUTO: 1 K/UL (ref 1–4.8)
LYMPHOCYTES NFR BLD: 6.5 % (ref 18–48)
MCH RBC QN AUTO: 30.3 PG (ref 27–31)
MCHC RBC AUTO-ENTMCNC: 33 G/DL (ref 32–36)
MCV RBC AUTO: 92 FL (ref 82–98)
MONOCYTES # BLD AUTO: 1.3 K/UL (ref 0.3–1)
MONOCYTES NFR BLD: 8.1 % (ref 4–15)
NEUTROPHILS # BLD AUTO: 13.1 K/UL (ref 1.8–7.7)
NEUTROPHILS NFR BLD: 84.6 % (ref 38–73)
NITRITE UR QL STRIP: NEGATIVE
NRBC BLD-RTO: 0 /100 WBC
PH UR STRIP: 7 [PH] (ref 5–8)
PLATELET # BLD AUTO: 174 K/UL (ref 150–450)
PMV BLD AUTO: 10.7 FL (ref 9.2–12.9)
POTASSIUM SERPL-SCNC: 3.5 MMOL/L (ref 3.5–5.1)
PROT SERPL-MCNC: 7 G/DL (ref 6–8.4)
PROT UR QL STRIP: NEGATIVE
RBC # BLD AUTO: 3.5 M/UL (ref 4.6–6.2)
SARS-COV-2 RDRP RESP QL NAA+PROBE: NEGATIVE
SODIUM SERPL-SCNC: 138 MMOL/L (ref 136–145)
SP GR UR STRIP: 1.01 (ref 1–1.03)
TROPONIN I SERPL DL<=0.01 NG/ML-MCNC: <0.03 NG/ML
URN SPEC COLLECT METH UR: NORMAL
UROBILINOGEN UR STRIP-ACNC: NEGATIVE EU/DL
WBC # BLD AUTO: 15.45 K/UL (ref 3.9–12.7)

## 2022-07-07 PROCEDURE — 96372 THER/PROPH/DIAG INJ SC/IM: CPT | Performed by: INTERNAL MEDICINE

## 2022-07-07 PROCEDURE — 93010 EKG 12-LEAD: ICD-10-PCS | Mod: ,,, | Performed by: INTERNAL MEDICINE

## 2022-07-07 PROCEDURE — 93010 ELECTROCARDIOGRAM REPORT: CPT | Mod: ,,, | Performed by: INTERNAL MEDICINE

## 2022-07-07 PROCEDURE — 84484 ASSAY OF TROPONIN QUANT: CPT | Performed by: NURSE PRACTITIONER

## 2022-07-07 PROCEDURE — 80053 COMPREHEN METABOLIC PANEL: CPT | Performed by: NURSE PRACTITIONER

## 2022-07-07 PROCEDURE — 82550 ASSAY OF CK (CPK): CPT | Performed by: NURSE PRACTITIONER

## 2022-07-07 PROCEDURE — 87040 BLOOD CULTURE FOR BACTERIA: CPT | Performed by: NURSE PRACTITIONER

## 2022-07-07 PROCEDURE — 96361 HYDRATE IV INFUSION ADD-ON: CPT

## 2022-07-07 PROCEDURE — 83605 ASSAY OF LACTIC ACID: CPT | Performed by: NURSE PRACTITIONER

## 2022-07-07 PROCEDURE — 25500020 PHARM REV CODE 255: Performed by: EMERGENCY MEDICINE

## 2022-07-07 PROCEDURE — 25000003 PHARM REV CODE 250: Performed by: INTERNAL MEDICINE

## 2022-07-07 PROCEDURE — G0378 HOSPITAL OBSERVATION PER HR: HCPCS

## 2022-07-07 PROCEDURE — 83880 ASSAY OF NATRIURETIC PEPTIDE: CPT | Performed by: NURSE PRACTITIONER

## 2022-07-07 PROCEDURE — 81003 URINALYSIS AUTO W/O SCOPE: CPT | Performed by: NURSE PRACTITIONER

## 2022-07-07 PROCEDURE — U0002 COVID-19 LAB TEST NON-CDC: HCPCS | Performed by: NURSE PRACTITIONER

## 2022-07-07 PROCEDURE — 63600175 PHARM REV CODE 636 W HCPCS: Performed by: INTERNAL MEDICINE

## 2022-07-07 PROCEDURE — 96360 HYDRATION IV INFUSION INIT: CPT

## 2022-07-07 PROCEDURE — 99285 EMERGENCY DEPT VISIT HI MDM: CPT | Mod: 25

## 2022-07-07 PROCEDURE — 85379 FIBRIN DEGRADATION QUANT: CPT | Performed by: NURSE PRACTITIONER

## 2022-07-07 PROCEDURE — 93005 ELECTROCARDIOGRAM TRACING: CPT | Performed by: INTERNAL MEDICINE

## 2022-07-07 PROCEDURE — 85025 COMPLETE CBC W/AUTO DIFF WBC: CPT | Performed by: NURSE PRACTITIONER

## 2022-07-07 RX ORDER — OXCARBAZEPINE 150 MG/1
150 TABLET, FILM COATED ORAL 2 TIMES DAILY
Status: DISCONTINUED | OUTPATIENT
Start: 2022-07-07 | End: 2022-07-11 | Stop reason: HOSPADM

## 2022-07-07 RX ORDER — MAGNESIUM SULFATE HEPTAHYDRATE 40 MG/ML
4 INJECTION, SOLUTION INTRAVENOUS
Status: DISCONTINUED | OUTPATIENT
Start: 2022-07-07 | End: 2022-07-11 | Stop reason: HOSPADM

## 2022-07-07 RX ORDER — RISPERIDONE 0.25 MG/1
0.25 TABLET ORAL NIGHTLY
Status: DISCONTINUED | OUTPATIENT
Start: 2022-07-07 | End: 2022-07-11 | Stop reason: HOSPADM

## 2022-07-07 RX ORDER — MULTIVITAMIN
1 TABLET ORAL DAILY
COMMUNITY
Start: 2022-06-20

## 2022-07-07 RX ORDER — ACETAMINOPHEN 325 MG/1
650 TABLET ORAL EVERY 8 HOURS PRN
Status: DISCONTINUED | OUTPATIENT
Start: 2022-07-07 | End: 2022-07-11 | Stop reason: HOSPADM

## 2022-07-07 RX ORDER — POTASSIUM CHLORIDE 20 MEQ/1
20 TABLET, EXTENDED RELEASE ORAL
Status: DISCONTINUED | OUTPATIENT
Start: 2022-07-07 | End: 2022-07-11 | Stop reason: HOSPADM

## 2022-07-07 RX ORDER — ONDANSETRON 2 MG/ML
4 INJECTION INTRAMUSCULAR; INTRAVENOUS EVERY 8 HOURS PRN
Status: DISCONTINUED | OUTPATIENT
Start: 2022-07-07 | End: 2022-07-11 | Stop reason: HOSPADM

## 2022-07-07 RX ORDER — ENOXAPARIN SODIUM 100 MG/ML
40 INJECTION SUBCUTANEOUS EVERY 24 HOURS
Status: DISCONTINUED | OUTPATIENT
Start: 2022-07-07 | End: 2022-07-11 | Stop reason: HOSPADM

## 2022-07-07 RX ORDER — MAGNESIUM SULFATE HEPTAHYDRATE 40 MG/ML
2 INJECTION, SOLUTION INTRAVENOUS
Status: DISCONTINUED | OUTPATIENT
Start: 2022-07-07 | End: 2022-07-11 | Stop reason: HOSPADM

## 2022-07-07 RX ORDER — ACETAMINOPHEN 500 MG
5000 TABLET ORAL DAILY
Status: DISCONTINUED | OUTPATIENT
Start: 2022-07-08 | End: 2022-07-11 | Stop reason: HOSPADM

## 2022-07-07 RX ORDER — MELOXICAM 7.5 MG/1
15 TABLET ORAL DAILY
Status: DISCONTINUED | OUTPATIENT
Start: 2022-07-08 | End: 2022-07-11 | Stop reason: HOSPADM

## 2022-07-07 RX ORDER — POTASSIUM CHLORIDE 20 MEQ/1
40 TABLET, EXTENDED RELEASE ORAL
Status: DISCONTINUED | OUTPATIENT
Start: 2022-07-07 | End: 2022-07-11 | Stop reason: HOSPADM

## 2022-07-07 RX ORDER — DONEPEZIL HYDROCHLORIDE 5 MG/1
10 TABLET, FILM COATED ORAL NIGHTLY
Status: DISCONTINUED | OUTPATIENT
Start: 2022-07-07 | End: 2022-07-11 | Stop reason: HOSPADM

## 2022-07-07 RX ORDER — MAGNESIUM SULFATE 1 G/100ML
1 INJECTION INTRAVENOUS
Status: DISCONTINUED | OUTPATIENT
Start: 2022-07-07 | End: 2022-07-11 | Stop reason: HOSPADM

## 2022-07-07 RX ORDER — TALC
6 POWDER (GRAM) TOPICAL NIGHTLY PRN
Status: DISCONTINUED | OUTPATIENT
Start: 2022-07-07 | End: 2022-07-11 | Stop reason: HOSPADM

## 2022-07-07 RX ORDER — SODIUM CHLORIDE, SODIUM LACTATE, POTASSIUM CHLORIDE, CALCIUM CHLORIDE 600; 310; 30; 20 MG/100ML; MG/100ML; MG/100ML; MG/100ML
INJECTION, SOLUTION INTRAVENOUS CONTINUOUS
Status: DISCONTINUED | OUTPATIENT
Start: 2022-07-07 | End: 2022-07-11 | Stop reason: HOSPADM

## 2022-07-07 RX ORDER — SERTRALINE HYDROCHLORIDE 50 MG/1
50 TABLET, FILM COATED ORAL NIGHTLY
Status: DISCONTINUED | OUTPATIENT
Start: 2022-07-07 | End: 2022-07-11 | Stop reason: HOSPADM

## 2022-07-07 RX ORDER — TRAMADOL HYDROCHLORIDE 50 MG/1
50 TABLET ORAL EVERY 6 HOURS PRN
Status: DISCONTINUED | OUTPATIENT
Start: 2022-07-07 | End: 2022-07-11 | Stop reason: HOSPADM

## 2022-07-07 RX ORDER — ATORVASTATIN CALCIUM 10 MG/1
10 TABLET, FILM COATED ORAL NIGHTLY
Status: DISCONTINUED | OUTPATIENT
Start: 2022-07-07 | End: 2022-07-11 | Stop reason: HOSPADM

## 2022-07-07 RX ORDER — LANOLIN ALCOHOL/MO/W.PET/CERES
800 CREAM (GRAM) TOPICAL
Status: DISCONTINUED | OUTPATIENT
Start: 2022-07-07 | End: 2022-07-11 | Stop reason: HOSPADM

## 2022-07-07 RX ADMIN — ENOXAPARIN SODIUM 40 MG: 40 INJECTION SUBCUTANEOUS at 09:07

## 2022-07-07 RX ADMIN — SODIUM CHLORIDE, SODIUM LACTATE, POTASSIUM CHLORIDE, AND CALCIUM CHLORIDE: .6; .31; .03; .02 INJECTION, SOLUTION INTRAVENOUS at 09:07

## 2022-07-07 RX ADMIN — ATORVASTATIN CALCIUM 10 MG: 10 TABLET, FILM COATED ORAL at 09:07

## 2022-07-07 RX ADMIN — RISPERIDONE 0.25 MG: 0.25 TABLET ORAL at 09:07

## 2022-07-07 RX ADMIN — OXCARBAZEPINE 150 MG: 150 TABLET, FILM COATED ORAL at 09:07

## 2022-07-07 RX ADMIN — DONEPEZIL HYDROCHLORIDE 10 MG: 5 TABLET, FILM COATED ORAL at 09:07

## 2022-07-07 RX ADMIN — IOHEXOL 75 ML: 350 INJECTION, SOLUTION INTRAVENOUS at 03:07

## 2022-07-07 RX ADMIN — SERTRALINE HYDROCHLORIDE 50 MG: 50 TABLET ORAL at 09:07

## 2022-07-07 NOTE — ED PROVIDER NOTES
"Encounter Date: 2022       History     Chief Complaint   Patient presents with    Fall     Unsure of timeline - only thing patient remembers is calling for people to come help - there is redness on both knees, nothing on abdomen or back.      Amilcar Phillip is a 76 year old male with history of HTN, Cognitive decline presenting to the ED after being found this AM down on the floor of his room in the assisted living center he lives in. He stated that last PM he got up from bed and then found himself on the floor. He "Thinks" he fell, but does not remember exactly what happened. He states that he called for help throughout the night because he was unable to get himself off the floor but was not found until this morning. He has no current complaints and states he does not recall how he fell or if he had any symptoms prior to this episode. The patient's sister was in the ED and states patient is currently at baseline but has had a steady decline over the past few months.         Review of patient's allergies indicates:  No Known Allergies  Past Medical History:   Diagnosis Date    Hypercholesteremia     Hypertension     Vitamin D deficiency      Past Surgical History:   Procedure Laterality Date    COLONOSCOPY      HERNIA REPAIR       Family History   Problem Relation Age of Onset    Osteoporosis Mother     Vaginal cancer Mother     Stroke Father      Social History     Tobacco Use    Smoking status: Former Smoker     Types: Cigarettes, Cigars     Quit date:      Years since quittin.5    Smokeless tobacco: Never Used   Substance Use Topics    Alcohol use: Yes    Drug use: Never     Review of Systems   Constitutional: Negative for fever.   HENT: Negative for sore throat.    Respiratory: Negative for shortness of breath.    Cardiovascular: Negative for chest pain.   Gastrointestinal: Negative for nausea.   Genitourinary: Negative for dysuria.   Musculoskeletal: Negative for back pain.   Skin: " Negative for rash.   Neurological: Negative for weakness.   Hematological: Does not bruise/bleed easily.       Physical Exam     Initial Vitals [07/07/22 1009]   BP Pulse Resp Temp SpO2   125/74 74 18 98 °F (36.7 °C) 95 %      MAP       --         Physical Exam    Nursing note and vitals reviewed.  Constitutional: He appears well-developed and well-nourished. He is not diaphoretic. No distress.   HENT:   Head: Normocephalic and atraumatic.   Mouth/Throat: Oropharynx is clear and moist.   Eyes: Conjunctivae and EOM are normal. Pupils are equal, round, and reactive to light.   Neck: Neck supple.   Cardiovascular: Normal rate, regular rhythm, normal heart sounds and intact distal pulses. Exam reveals no gallop and no friction rub.    No murmur heard.  Pulmonary/Chest: Breath sounds normal. He has no wheezes. He has no rhonchi. He has no rales.   Abdominal: Abdomen is soft. He exhibits no distension. There is no abdominal tenderness.   Musculoskeletal:         General: Normal range of motion.      Cervical back: Neck supple.     Neurological: He is alert and oriented to person, place, and time. No sensory deficit.   4/5 strength in bilateral upper extremities  5/5 strength in bilateral lower extremities  Unable to raise arms but with passive raising, he has no pronator drift  Unable to stand without assistance   Skin: No rash noted. No erythema.         ED Course   Procedures  Labs Reviewed   CBC W/ AUTO DIFFERENTIAL - Abnormal; Notable for the following components:       Result Value    WBC 15.45 (*)     RBC 3.50 (*)     Hemoglobin 10.6 (*)     Hematocrit 32.1 (*)     RDW 14.9 (*)     Gran # (ANC) 13.1 (*)     Mono # 1.3 (*)     Gran % 84.6 (*)     Lymph % 6.5 (*)     All other components within normal limits   COMPREHENSIVE METABOLIC PANEL - Abnormal; Notable for the following components:    Total Bilirubin 1.1 (*)     All other components within normal limits    Narrative:     Recoll. [id number] by ALLA at 07/07/2022  13:30, reason: Specimen   hemolyzed   CK - Abnormal; Notable for the following components:     (*)     All other components within normal limits    Narrative:     Recoll. [id number] by [email or web address] at 07/07/2022 13:30, reason: Specimen   hemolyzed   D DIMER, QUANTITATIVE - Abnormal; Notable for the following components:    D-Dimer 0.96 (*)     All other components within normal limits    Narrative:     DDimer critical result(s) called and verbal readback obtained from     by HS3 07/07/2022 13:25   CULTURE, BLOOD   CULTURE, BLOOD   TROPONIN I    Narrative:     Recoll. [id number] by [email or web address] at 07/07/2022 13:30, reason: Specimen   hemolyzed   B-TYPE NATRIURETIC PEPTIDE   SARS-COV-2 RNA AMPLIFICATION, QUAL   URINALYSIS, REFLEX TO URINE CULTURE    Narrative:     Specimen Source->Urine   LACTIC ACID, PLASMA        ECG Results          EKG 12-lead (In process)  Result time 07/07/22 13:34:32    In process by Interface, Lab In [facility] (07/07/22 13:34:32)                 Narrative:    Test Reason : W19.XXXA,    Vent. Rate : 065 BPM     Atrial Rate : 065 BPM     P-R Int : 170 ms          QRS Dur : 088 ms      QT Int : 440 ms       P-R-T Axes : 043 030 003 degrees     QTc Int : 457 ms    Normal sinus rhythm  Inferior infarct ,age undetermined  Abnormal ECG  When compared with ECG of 23-DEC-2019 23:25,  Vent. rate has decreased BY  40 BPM    Referred By: [email or web address]   SELF           Confirmed By:                             Imaging Results          CTA Chest Non-Coronary (PE Study) (Final result)  Result time 07/07/22 15:58:50    Final result by Savannah Arango MD (07/07/22 15:58:50)                 Narrative:    CT All CT scans at this facility used dose modulation, iterative reconstruction and/or weight-based dosing when appropriate to reduce radiation doses  as low as reasonably achievable.    HISTORY: Positive d-dimer, fall.    FINDINGS: Thin axial imaging through the chest was performed with 100 mL Omnipaque 350 IV  contrast, with sagittal and coronal reformatted images and 3-D reconstructions performed on an independent workstation, with images stored in the patient's permanent electronic medical record.    This is a high-grade quality study for the evaluation of pulmonary embolism. The pulmonary arteries are normal in appearance without pulmonary emboli noted up to the subsegmental level, noting limitations of CT technique for identifying small isolated subsegmental emboli.    The heart is normal in size. The aorta is normal in caliber without aneurysm or dissection. There is mild vascular calcification of the aorta.  There is no hilar, mediastinal or axillary adenopathy.    There are no pulmonary nodules, infiltrates or pleural effusions. The trachea and bronchi are normal.  The esophagus is unremarkable.    The visualized portion of the upper abdomen demonstrates tiny gallstones. The adrenal glands are normal.    There are degenerative changes of the thoracic spine. There is mild compression of the superior endplate of T7 the age of which is indeterminate.      IMPRESSION: No CT evidence of pulmonary emboli    No acute pulmonary process    Mild compression of the superior endplate of T7 the age of which is indeterminate    Tiny gallstones.    Electronically signed by:  Savannah Arango MD  7/7/2022 3:58 PM CDT Workstation: [id number]                             X-Ray Pelvis Routine AP (Final result)  Result time 07/07/22 13:17:47    Final result by Alejo Ramirez MD (07/07/22 13:17:47)                 Narrative:    HISTORY: Pelvic pain post fall.    FINDINGS: 2 AP views of pelvis show no acute fracture, dislocation or destructive osseous lesion. The hip joint spaces are fairly well preserved, with the sacroiliac joints and symphysis pubis within normal limits. Bone mineralization is normal.    IMPRESSION: Negative for acute fracture or dislocation.    Electronically signed by:  Alejo Ramirez MD  7/7/2022 1:17 PM CDT  Workstation: [id number]                             X-Ray Chest AP Portable (Final result)  Result time 07/07/22 12:51:51    Final result by Savannah Arango MD (07/07/22 12:51:51)                 Narrative:    XR CHEST 1 VIEW    CLINICAL HISTORY:  76 years Male fall    COMPARISON: 12/23/2019    FINDINGS: Cardiomediastinal silhouette is within normal limits. Lungs are normally expanded with no airspace consolidation. No pleural effusion or pneumothorax. No acute osseous abnormality.    IMPRESSION: No acute pulmonary process.    Electronically signed by:  Savannah Arango MD  7/7/2022 12:51 PM CDT Workstation: [id number]                             CT Cervical Spine Without Contrast (Final result)  Result time 07/07/22 12:43:27    Final result by Savannah Arango MD (07/07/22 12:43:27)                 Narrative:    All CT scans at this facility used dose modulation, iterative reconstruction and/or weight-based dosing when appropriate to reduce radiation doses  as low as reasonably achievable.    CT scan of the cervical spine    Clinical history is Neck trauma (Age >= 65y)    Axial images the cervical spine were obtained with sagittal and coronal reconstructed images. The cervical spine is in satisfactory alignment. The vertebral bodies are of normal height. There is disc space narrowing at C4-5. The remainder of the intervertebral disc spaces are maintained. The odontoid process is intact. Lateral masses of C1 are symmetrical. The facet joints are aligned. There is no fracture or subluxation. There is no epidural fluid collection. The paraspinous soft tissues are normal. There is vascular calcification at the carotid bifurcations. The lung apices are clear.    IMPRESSION: No evidence of cervical spine fracture or subluxation    Mild disc space narrowing at C4-5    Electronically signed by:  Savannah Arango MD  7/7/2022 12:43 PM CDT Workstation: [id number]                             CT Head Without Contrast  (Final result)  Result time 07/07/22 12:42:09    Final result by Augie Smith MD (07/07/22 12:42:09)                 Narrative:    CMS MANDATED QUALITY DATA - CT RADIATION - 436    All CT scans at this facility utilize dose modulation, iterative reconstruction, and/or weight based dosing when appropriate to reduce radiation dose to as low as reasonably achievable.        Reason: Syncope, recurrent Fall (Unsure of timeline - only thing patient remembers is calling for people to come help    TECHNIQUE: Head CT without IV contrast.    COMPARISON: MR brain March 11, 2020.    FINDINGS:    Gray-white differentiation is maintained without hemorrhage, midline shift, or mass effect. Periventricular and subcortical white matter low-attenuation bilaterally suggest chronic small vessel ischemic changes. Diffuse cerebral and cerebellar atrophy is evident.    The ventricles and cisterns are maintained.    Calvarium is intact. There is chronic opacification of the left maxillary sinus with focal dehiscence of the medial maxillary sinus wall and extension of opacification into the left nasal cavity. Findings could possibly reflect antrochoanal polyps.    IMPRESSION:    1. No acute intracranial abnormality.  2. Chronic and involutional changes of the brain.            Electronically signed by:  Augie Smith DO  7/7/2022 12:42 PM CDT Workstation: [id number]                               Medications   lactated ringers infusion (has no administration in time range)   iohexoL (OMNIPAQUE 350) injection 75 mL (75 mLs Intravenous Given 7/7/22 1550)           APC / Resident Notes:   The patient had a possible syncopal episode with no recollection of how he arrived on the floor of his residence. Workup in the ED is unremarkable for any emergent findings or explaination of how he fell. He is unable to stand or ambulate without assistance and is very unsteady. He has leukocytosis with mildly elevated CPK and no YASSINE. I discussed the case  with Dr. Estrada who also evaluated the patient in the ED. He will be admitted for PT evaluation and further monitoring.         ED Course as of 07/07/22 1827   Thu Jul 07, 2022   1350 EKG 13:  23 normal sinus rhythm rate of 65.  No ST elevation or depression.  No STEMI.  EKG interpreted by me [JR]      ED Course User Index  [JR] Joao Richardson DO             Clinical Impression:   Final diagnoses:  [W19.XXXA] Fall  [R55] Syncope, unspecified syncope type (Primary)  [Z74.1] Requires assistance with activities of daily living (ADL)          ED Disposition Condition    Observation               Yvette Wilkinson NP  07/07/22 6073

## 2022-07-07 NOTE — SUBJECTIVE & OBJECTIVE
Past Medical History:   Diagnosis Date    Hypercholesteremia     Hypertension     Vitamin D deficiency        Past Surgical History:   Procedure Laterality Date    COLONOSCOPY      HERNIA REPAIR         Review of patient's allergies indicates:  No Known Allergies    No current facility-administered medications on file prior to encounter.     Current Outpatient Medications on File Prior to Encounter   Medication Sig    cholecalciferol, vitamin D3, 125 mcg (5,000 unit) capsule TAKE ONE CAPSULE BY MOUTH DAILY    donepeziL (ARICEPT) 10 MG tablet Take 1 tablet (10 mg total) by mouth every evening.    meloxicam (MOBIC) 15 MG tablet TAKE ONE TABLET BY MOUTH EVERY DAY    multivitamin (THERAGRAN) per tablet Take 1 tablet by mouth once daily.    OXcarbazepine (TRILEPTAL) 150 MG Tab TAKE ONE TABLET BY MOUTH EVERY MORNING and TAKE ONE TABLET BY MOUTH EVERY EVENING    risperiDONE (RISPERDAL) 0.25 MG Tab TAKE ONE TABLET BY MOUTH EVERY EVENING    rosuvastatin (CRESTOR) 10 MG tablet TAKE ONE TABLET BY MOUTH EVERY DAY    sertraline (ZOLOFT) 100 MG tablet Take 0.5 tablets (50 mg total) by mouth every evening.    acetaminophen (TYLENOL) 650 MG TbSR Take 1,300 mg by mouth every 8 (eight) hours.    magnesium hydroxide 400 mg/5 ml (MILK OF MAGNESIA) 400 mg/5 mL Susp Take 15 mLs by mouth daily as needed.    traMADoL (ULTRAM) 50 mg tablet Take 1 tablet (50 mg total) by mouth every 4 to 6 hours as needed for Pain.    [DISCONTINUED] multivit-min/FA/lycopen/lutein (CENTRUM SILVER MEN ORAL) Take 1 tablet by mouth once daily.     Family History       Problem Relation (Age of Onset)    Osteoporosis Mother    Stroke Father    Vaginal cancer Mother          Tobacco Use    Smoking status: Former Smoker     Types: Cigarettes, Cigars     Quit date:      Years since quittin.5    Smokeless tobacco: Never Used   Substance and Sexual Activity    Alcohol use: Yes    Drug use: Never    Sexual activity: Not Currently     Review of Systems    Constitutional: Negative.    HENT: Negative.     Eyes: Negative.    Respiratory: Negative.     Cardiovascular: Negative.    Gastrointestinal: Negative.    Endocrine: Negative.    Genitourinary: Negative.    Musculoskeletal: Negative.    Skin: Negative.    Allergic/Immunologic: Negative.    Neurological: Negative.    Hematological: Negative.    All other systems reviewed and are negative.  Objective:     Vital Signs (Most Recent):  Temp: 98 °F (36.7 °C) (07/07/22 1009)  Pulse: 68 (07/07/22 1724)  Resp: 12 (07/07/22 1724)  BP: 137/74 (07/07/22 1724)  SpO2: 96 % (07/07/22 1724) Vital Signs (24h Range):  Temp:  [98 °F (36.7 °C)] 98 °F (36.7 °C)  Pulse:  [66-74] 68  Resp:  [12-18] 12  SpO2:  [95 %-97 %] 96 %  BP: (125-146)/(72-76) 137/74     Weight: 70.3 kg (155 lb)  Body mass index is 24.28 kg/m².    Physical Exam  Vitals and nursing note reviewed.   Constitutional:       Appearance: He is well-developed.   HENT:      Head: Normocephalic and atraumatic.      Right Ear: External ear normal.      Left Ear: External ear normal.      Nose: Nose normal.   Eyes:      Conjunctiva/sclera: Conjunctivae normal.      Pupils: Pupils are equal, round, and reactive to light.   Cardiovascular:      Rate and Rhythm: Normal rate and regular rhythm.      Heart sounds: Normal heart sounds.   Pulmonary:      Effort: Pulmonary effort is normal.      Breath sounds: Normal breath sounds.      Comments: Decreased entry without adventitious sounds  Abdominal:      General: Bowel sounds are normal.      Palpations: Abdomen is soft.   Musculoskeletal:         General: Normal range of motion.      Cervical back: Normal range of motion and neck supple.   Skin:     General: Skin is warm and dry.      Capillary Refill: Capillary refill takes less than 2 seconds.   Neurological:      Mental Status: He is alert and oriented to person, place, and time.   Psychiatric:         Behavior: Behavior normal.         Thought Content: Thought content normal.          Judgment: Judgment normal.         CRANIAL NERVES     CN III, IV, VI   Pupils are equal, round, and reactive to light.     Significant Labs: All pertinent labs within the past 24 hours have been reviewed.  CBC:   Recent Labs   Lab 07/07/22  1049   WBC 15.45*   HGB 10.6*   HCT 32.1*        CMP:   Recent Labs   Lab 07/07/22  1436      K 3.5      CO2 28      BUN 15   CREATININE 0.7   CALCIUM 9.2   PROT 7.0   ALBUMIN 4.2   BILITOT 1.1*   ALKPHOS 74   AST 26   ALT 17   ANIONGAP 8   EGFRNONAA >60.0     Cardiac Markers:   Recent Labs   Lab 07/07/22  1049   BNP 78     Troponin:   Recent Labs   Lab 07/07/22  1436   TROPONINI <0.030       Significant Imaging: I have reviewed all pertinent imaging results/findings within the past 24 hours.

## 2022-07-07 NOTE — ED NOTES
Orthostatic BP   07/07/22 1751 07/07/22 1753 07/07/22 1754   Vital Signs   Pulse 68 80 85   Resp 16 (!) 41 (!) 38   SpO2 96 % (!) 94 % 95 %   BP (!) 145/69 (!) 144/72 135/67   MAP (mmHg) 99 101 94   BP Location Right arm Right arm Right arm   BP Method Automatic Automatic Automatic   Patient Position Lying Sitting Standing

## 2022-07-07 NOTE — H&P
"[facility] - Emergency Dept  Hospital Medicine  History & Physical    Patient Name: Amilcar Phillip  MRN: [id number]  Patient Class: OP- Observation  Admission Date: 7/7/2022  Attending Physician: Josh Estrada MD  Primary Care Provider: Angel Edwards Jr, MD         Patient information was obtained from patient, past medical records and ER records.     Subjective:     Principal Problem:Syncope with normal neurologic examination    Chief Complaint:   Chief Complaint   Patient presents with    Fall     Unsure of timeline - only thing patient remembers is calling for people to come help - there is redness on both knees, nothing on abdomen or back.         HPI: 76 year old male with history of HTN, Cognitive decline, and has had 2/3 COVID vaccinations was brought to ED after being found this AM down on the floor of his room in the assisted living center he lives in. He stated that last PM he got up from bed and then found himself on the floor. He "Thinks" he fell, but does not remember exactly what happened. He recollect calling out for help, but "No one came". He laid on the floor all PM. He denied any pain. No CP or SOB. No palpitations. No HA, vision changes or dizziness. No abdominal pain. The patient is very soft spoken. One must put ear close to his head to hear his speech. ED provider stated that when she tried to stand the patient he was "Very unstable on his feet". Discussed with provider: will order PT eval to see if patient requires higher level of care than assisted living.    In ED: labs reviewed: leukocytosis, with mild normocytic anemia; normal electrolytes, renal function and transaminases; normal BNP and troponin; CPK is elevated, D-dimer is elevated, and lactate is normal. CTA: chest: no PE. CT Head no acute pathology. C-spine: no fracture. Pelvis: no fracture.  CXR reviewed: NAPD. EKG reviewed: sinus with old inferior, but no acute segments. Discussed with ED provider: admit " for telemetry monitoring for possible syncope, and gentle hydration for rhabdomyolysis.       Past Medical History:   Diagnosis Date    Hypercholesteremia     Hypertension     Vitamin D deficiency        Past Surgical History:   Procedure Laterality Date    COLONOSCOPY      HERNIA REPAIR         Review of patient's allergies indicates:  No Known Allergies    No current facility-administered medications on file prior to encounter.     Current Outpatient Medications on File Prior to Encounter   Medication Sig    cholecalciferol, vitamin D3, 125 mcg (5,000 unit) capsule TAKE ONE CAPSULE BY MOUTH DAILY    donepeziL (ARICEPT) 10 MG tablet Take 1 tablet (10 mg total) by mouth every evening.    meloxicam (MOBIC) 15 MG tablet TAKE ONE TABLET BY MOUTH EVERY DAY    multivitamin (THERAGRAN) per tablet Take 1 tablet by mouth once daily.    OXcarbazepine (TRILEPTAL) 150 MG Tab TAKE ONE TABLET BY MOUTH EVERY MORNING and TAKE ONE TABLET BY MOUTH EVERY EVENING    risperiDONE (RISPERDAL) 0.25 MG Tab TAKE ONE TABLET BY MOUTH EVERY EVENING    rosuvastatin (CRESTOR) 10 MG tablet TAKE ONE TABLET BY MOUTH EVERY DAY    sertraline (ZOLOFT) 100 MG tablet Take 0.5 tablets (50 mg total) by mouth every evening.    acetaminophen (TYLENOL) 650 MG TbSR Take 1,300 mg by mouth every 8 (eight) hours.    magnesium hydroxide 400 mg/5 ml (MILK OF MAGNESIA) 400 mg/5 mL Susp Take 15 mLs by mouth daily as needed.    traMADoL (ULTRAM) 50 mg tablet Take 1 tablet (50 mg total) by mouth every 4 to 6 hours as needed for Pain.    [DISCONTINUED] multivit-min/FA/lycopen/lutein (CENTRUM SILVER MEN ORAL) Take 1 tablet by mouth once daily.     Family History       Problem Relation (Age of Onset)    Osteoporosis Mother    Stroke Father    Vaginal cancer Mother          Tobacco Use    Smoking status: Former Smoker     Types: Cigarettes, Cigars     Quit date:      Years since quittin.5    Smokeless tobacco: Never Used   Substance and Sexual  Activity    Alcohol use: Yes    Drug use: Never    Sexual activity: Not Currently     Review of Systems   Constitutional: Negative.    HENT: Negative.     Eyes: Negative.    Respiratory: Negative.     Cardiovascular: Negative.    Gastrointestinal: Negative.    Endocrine: Negative.    Genitourinary: Negative.    Musculoskeletal: Negative.    Skin: Negative.    Allergic/Immunologic: Negative.    Neurological: Negative.    Hematological: Negative.    All other systems reviewed and are negative.  Objective:     Vital Signs (Most Recent):  Temp: 98 °F (36.7 °C) (07/07/22 1009)  Pulse: 68 (07/07/22 1724)  Resp: 12 (07/07/22 1724)  BP: 137/74 (07/07/22 1724)  SpO2: 96 % (07/07/22 1724) Vital Signs (24h Range):  Temp:  [98 °F (36.7 °C)] 98 °F (36.7 °C)  Pulse:  [66-74] 68  Resp:  [12-18] 12  SpO2:  [95 %-97 %] 96 %  BP: (125-146)/(72-76) 137/74     Weight: 70.3 kg (155 lb)  Body mass index is 24.28 kg/m².    Physical Exam  Vitals and nursing note reviewed.   Constitutional:       Appearance: He is well-developed.   HENT:      Head: Normocephalic and atraumatic.      Right Ear: External ear normal.      Left Ear: External ear normal.      Nose: Nose normal.   Eyes:      Conjunctiva/sclera: Conjunctivae normal.      Pupils: Pupils are equal, round, and reactive to light.   Cardiovascular:      Rate and Rhythm: Normal rate and regular rhythm.      Heart sounds: Normal heart sounds.   Pulmonary:      Effort: Pulmonary effort is normal.      Breath sounds: Normal breath sounds.      Comments: Decreased entry without adventitious sounds  Abdominal:      General: Bowel sounds are normal.      Palpations: Abdomen is soft.   Musculoskeletal:         General: Normal range of motion.      Cervical back: Normal range of motion and neck supple.   Skin:     General: Skin is warm and dry.      Capillary Refill: Capillary refill takes less than 2 seconds.   Neurological:      Mental Status: He is alert and oriented to person, place, and  time.   Psychiatric:         Behavior: Behavior normal.         Thought Content: Thought content normal.         Judgment: Judgment normal.         CRANIAL NERVES     CN III, IV, VI   Pupils are equal, round, and reactive to light.     Significant Labs: All pertinent labs within the past 24 hours have been reviewed.  CBC:   Recent Labs   Lab 07/07/22  1049   WBC 15.45*   HGB 10.6*   HCT 32.1*        CMP:   Recent Labs   Lab 07/07/22  1436      K 3.5      CO2 28      BUN 15   CREATININE 0.7   CALCIUM 9.2   PROT 7.0   ALBUMIN 4.2   BILITOT 1.1*   ALKPHOS 74   AST 26   ALT 17   ANIONGAP 8   EGFRNONAA >60.0     Cardiac Markers:   Recent Labs   Lab 07/07/22  1049   BNP 78     Troponin:   Recent Labs   Lab 07/07/22  1436   TROPONINI <0.030       Significant Imaging: I have reviewed all pertinent imaging results/findings within the past 24 hours.    Assessment/Plan:     * Syncope with normal neurologic examination  Telemetry monitoring overnight      Physical deconditioning  PT evaluation      Traumatic rhabdomyolysis  Gentle hydration        VTE Risk Mitigation (From admission, onward)    None             Josh Estrada MD  Department of Hospital Medicine   [facility] - Emergency Dept

## 2022-07-07 NOTE — HPI
"76 year old male with history of HTN, Cognitive decline, and has had 2/3 COVID vaccinations was brought to ED after being found this AM down on the floor of his room in the assisted living center he lives in. He stated that last PM he got up from bed and then found himself on the floor. He "Thinks" he fell, but does not remember exactly what happened. He recollect calling out for help, but "No one came". He laid on the floor all PM. He denied any pain. No CP or SOB. No palpitations. No HA, vision changes or dizziness. No abdominal pain. The patient is very soft spoken. One must put ear close to his head to hear his speech. ED provider stated that when she tried to stand the patient he was "Very unstable on his feet". Discussed with provider: will order PT eval to see if patient requires higher level of care than assisted living.    In ED: labs reviewed: leukocytosis, with mild normocytic anemia; normal electrolytes, renal function and transaminases; normal BNP and troponin; CPK is elevated, D-dimer is elevated, and lactate is normal. CTA: chest: no PE. CT Head no acute pathology. C-spine: no fracture. Pelvis: no fracture.  CXR reviewed: NAPD. EKG reviewed: sinus with old inferior, but no acute segments. Discussed with ED provider: admit for telemetry monitoring for possible syncope, and gentle hydration for rhabdomyolysis.   "

## 2022-07-08 PROBLEM — M62.82 NON-TRAUMATIC RHABDOMYOLYSIS: Status: ACTIVE | Noted: 2022-07-08

## 2022-07-08 LAB
ANION GAP SERPL CALC-SCNC: 10 MMOL/L (ref 8–16)
BASOPHILS # BLD AUTO: 0.04 K/UL (ref 0–0.2)
BASOPHILS NFR BLD: 0.5 % (ref 0–1.9)
BUN SERPL-MCNC: 16 MG/DL (ref 8–23)
CALCIUM SERPL-MCNC: 9.1 MG/DL (ref 8.7–10.5)
CHLORIDE SERPL-SCNC: 103 MMOL/L (ref 95–110)
CK SERPL-CCNC: 404 U/L (ref 20–200)
CO2 SERPL-SCNC: 26 MMOL/L (ref 23–29)
CREAT SERPL-MCNC: 0.7 MG/DL (ref 0.5–1.4)
DIFFERENTIAL METHOD: ABNORMAL
EOSINOPHIL # BLD AUTO: 0.4 K/UL (ref 0–0.5)
EOSINOPHIL NFR BLD: 4.2 % (ref 0–8)
ERYTHROCYTE [DISTWIDTH] IN BLOOD BY AUTOMATED COUNT: 14.9 % (ref 11.5–14.5)
EST. GFR  (AFRICAN AMERICAN): >60 ML/MIN/1.73 M^2
EST. GFR  (NON AFRICAN AMERICAN): >60 ML/MIN/1.73 M^2
GLUCOSE SERPL-MCNC: 78 MG/DL (ref 70–110)
HCT VFR BLD AUTO: 36.2 % (ref 40–54)
HGB BLD-MCNC: 12 G/DL (ref 14–18)
IMM GRANULOCYTES # BLD AUTO: 0.02 K/UL (ref 0–0.04)
IMM GRANULOCYTES NFR BLD AUTO: 0.2 % (ref 0–0.5)
LYMPHOCYTES # BLD AUTO: 1.4 K/UL (ref 1–4.8)
LYMPHOCYTES NFR BLD: 16.1 % (ref 18–48)
MCH RBC QN AUTO: 29.7 PG (ref 27–31)
MCHC RBC AUTO-ENTMCNC: 33.1 G/DL (ref 32–36)
MCV RBC AUTO: 90 FL (ref 82–98)
MONOCYTES # BLD AUTO: 0.9 K/UL (ref 0.3–1)
MONOCYTES NFR BLD: 10.6 % (ref 4–15)
NEUTROPHILS # BLD AUTO: 6 K/UL (ref 1.8–7.7)
NEUTROPHILS NFR BLD: 68.4 % (ref 38–73)
NRBC BLD-RTO: 0 /100 WBC
PLATELET # BLD AUTO: 250 K/UL (ref 150–450)
PLATELET BLD QL SMEAR: ABNORMAL
PMV BLD AUTO: 10.4 FL (ref 9.2–12.9)
POTASSIUM SERPL-SCNC: 3.6 MMOL/L (ref 3.5–5.1)
RBC # BLD AUTO: 4.04 M/UL (ref 4.6–6.2)
SODIUM SERPL-SCNC: 139 MMOL/L (ref 136–145)
WBC # BLD AUTO: 8.8 K/UL (ref 3.9–12.7)

## 2022-07-08 PROCEDURE — 12000002 HC ACUTE/MED SURGE SEMI-PRIVATE ROOM

## 2022-07-08 PROCEDURE — 25000003 PHARM REV CODE 250: Performed by: INTERNAL MEDICINE

## 2022-07-08 PROCEDURE — 63600175 PHARM REV CODE 636 W HCPCS: Performed by: STUDENT IN AN ORGANIZED HEALTH CARE EDUCATION/TRAINING PROGRAM

## 2022-07-08 PROCEDURE — 36415 COLL VENOUS BLD VENIPUNCTURE: CPT | Performed by: STUDENT IN AN ORGANIZED HEALTH CARE EDUCATION/TRAINING PROGRAM

## 2022-07-08 PROCEDURE — 85025 COMPLETE CBC W/AUTO DIFF WBC: CPT | Performed by: INTERNAL MEDICINE

## 2022-07-08 PROCEDURE — 87040 BLOOD CULTURE FOR BACTERIA: CPT | Performed by: STUDENT IN AN ORGANIZED HEALTH CARE EDUCATION/TRAINING PROGRAM

## 2022-07-08 PROCEDURE — 97166 OT EVAL MOD COMPLEX 45 MIN: CPT

## 2022-07-08 PROCEDURE — 80048 BASIC METABOLIC PNL TOTAL CA: CPT | Performed by: INTERNAL MEDICINE

## 2022-07-08 PROCEDURE — 63600175 PHARM REV CODE 636 W HCPCS: Performed by: INTERNAL MEDICINE

## 2022-07-08 PROCEDURE — 36415 COLL VENOUS BLD VENIPUNCTURE: CPT | Performed by: INTERNAL MEDICINE

## 2022-07-08 PROCEDURE — 96361 HYDRATE IV INFUSION ADD-ON: CPT

## 2022-07-08 PROCEDURE — 82550 ASSAY OF CK (CPK): CPT | Performed by: INTERNAL MEDICINE

## 2022-07-08 PROCEDURE — 97535 SELF CARE MNGMENT TRAINING: CPT

## 2022-07-08 PROCEDURE — 97161 PT EVAL LOW COMPLEX 20 MIN: CPT

## 2022-07-08 RX ADMIN — OXCARBAZEPINE 150 MG: 150 TABLET, FILM COATED ORAL at 08:07

## 2022-07-08 RX ADMIN — DONEPEZIL HYDROCHLORIDE 10 MG: 5 TABLET, FILM COATED ORAL at 08:07

## 2022-07-08 RX ADMIN — OXCARBAZEPINE 150 MG: 150 TABLET, FILM COATED ORAL at 09:07

## 2022-07-08 RX ADMIN — ATORVASTATIN CALCIUM 10 MG: 10 TABLET, FILM COATED ORAL at 08:07

## 2022-07-08 RX ADMIN — THERA TABS 1 TABLET: TAB at 09:07

## 2022-07-08 RX ADMIN — RISPERIDONE 0.25 MG: 0.25 TABLET ORAL at 08:07

## 2022-07-08 RX ADMIN — SERTRALINE HYDROCHLORIDE 50 MG: 50 TABLET ORAL at 08:07

## 2022-07-08 RX ADMIN — MELOXICAM 15 MG: 7.5 TABLET ORAL at 09:07

## 2022-07-08 RX ADMIN — ENOXAPARIN SODIUM 40 MG: 40 INJECTION SUBCUTANEOUS at 04:07

## 2022-07-08 RX ADMIN — POTASSIUM CHLORIDE 20 MEQ: 1500 TABLET, EXTENDED RELEASE ORAL at 09:07

## 2022-07-08 RX ADMIN — Medication 5000 UNITS: at 09:07

## 2022-07-08 RX ADMIN — SODIUM CHLORIDE, SODIUM LACTATE, POTASSIUM CHLORIDE, AND CALCIUM CHLORIDE: .6; .31; .03; .02 INJECTION, SOLUTION INTRAVENOUS at 02:07

## 2022-07-08 NOTE — PLAN OF CARE
[facility]  Initial Discharge Assessment       Primary Care Provider: Angel Edwards Jr, MD    Admission Diagnosis: Syncope, unspecified syncope type [R55]    Admission Date: 7/7/2022  Expected Discharge Date:       met with patient at bedside to complete assessment.  contacted patient's sister Leia via phone to confirm some patient info. Demographics, PCP and insurance verified. Patient resides at [facility]. Patient oriented xs 4. Patient has RW and cane at home. No current HH. No dialysis. Patient will be transported home by family. No further needs to discuss at this time. Case management to assist with any additional needs at discharge.     Discharge Barriers Identified: None    Payor: MEDICARE / Plan: MEDICARE PART A & B / Product Type: Government /     Extended Emergency Contact Information  Primary Emergency Contact: Leia Barrientos  Mobile Phone: [phone]  Relation: Sister   needed? No    Discharge Plan A: Assisted Living  Discharge Plan B: [facility] - [place] - [address]  [address]  [place] [address]  Phone: [phone] Fax: [phone]      Initial Assessment (most recent)     Adult Discharge Assessment - 07/08/22 1114        Discharge Assessment    Assessment Type Discharge Planning Assessment     Confirmed/corrected address, phone number and insurance Yes     Confirmed Demographics Correct on Facesheet     Source of Information patient     If unable to respond/provide information was family/caregiver contacted? Yes     Contact Name/Number Leia Barrientos (Sister)   [phone] (Mobile)     Does patient/caregiver understand observation status Yes     Lives With facility resident     Facility Arrived From: [facility]     Do you expect to return to your current living situation? Yes     Prior to hospitilization cognitive status: Unable to Assess     Current cognitive status:  Alert/Oriented     Walking or Climbing Stairs Difficulty ambulation difficulty, requires equipment     Mobility Management RW     Dressing/Bathing Difficulty bathing difficulty, assistance 1 person     Home Accessibility wheelchair accessible     Home Layout Able to live on 1st floor     Equipment Currently Used at Home cane, straight;walker, rolling     Do you currently have service(s) that help you manage your care at home? No     Do you take prescription medications? Yes     Do you have prescription coverage? Yes     Do you have any problems affording any of your prescribed medications? No     Is the patient taking medications as prescribed? yes     Who is going to help you get home at discharge? Patient has care attendents at [facility]     How do you get to doctors appointments? family or friend will provide     Are you on dialysis? No     Do you take coumadin? No     Discharge Plan A Assisted Living     Discharge Plan B Home Health     DME Needed Upon Discharge  bedside commode     Discharge Plan discussed with: Patient;Sibling     Name(s) and Number(s) Leia Barrientos (Sister)   [phone] (Mobile)     Discharge Barriers Identified None

## 2022-07-08 NOTE — PLAN OF CARE
SNF packet sent via careport to [facility]. Patient 1st choice. If [facility] unable to accept, patient has no other preference choices.  contacted Muriel via secure chat to review patient case.    Patient choice form signed and scanned into media.       07/08/22 1422   Post-Acute Status   Post-Acute Authorization Placement   Post-Acute Placement Status Referrals Sent   Patient choice form signed by patient/caregiver List with quality metrics by geographic area provided

## 2022-07-08 NOTE — PT/OT/SLP EVAL
Occupational Therapy   Evaluation    Name: Amilcar Phillip  MRN: [id number]  Admitting Diagnosis:  Syncope with normal neurologic examination  Recent Surgery: * No surgery found *      Recommendations:     Discharge Recommendations: nursing facility, skilled, home health OT, assisted living facility  Discharge Equipment Recommendations:  other (see comments) (TBD)  Barriers to discharge:  None    Assessment:     Amilcar Phillip is a 76 y.o. male with a medical diagnosis of Syncope with normal neurologic examination.  He presents agreeable to OT evaluation. Performance deficits affecting function: weakness, impaired endurance, impaired self care skills, impaired functional mobilty, gait instability, impaired balance, impaired cognition, decreased safety awareness.      Rehab Prognosis: Good; patient would benefit from acute skilled OT services to address these deficits and reach maximum level of function.       Plan:     Patient to be seen 5 x/week to address the above listed problems via self-care/home management, therapeutic activities, therapeutic exercises  · Plan of Care Expires: 08/08/22  · Plan of Care Reviewed with: patient    Subjective     Chief Complaint: decreased function  Patient/Family Comments/goals: return to [facility]    Occupational Profile:  Living Environment: [facility] at [facility]  Previous level of function: Mod Ind with ADLs but receives assistance for bathing, uses a cane or walker for mobility  Roles and Routines: Resident at [facility]  Equipment Used at Home:  cane, straight, walker, rolling  Assistance upon Discharge: facility    Pain/Comfort:  · Pain Rating 1: 0/10  · Pain Rating Post-Intervention 1: 0/10    Patients cultural, spiritual, Yazidism conflicts given the current situation: no    Objective:     Communicated with: nurse prior to session.  Patient found HOB elevated with bed alarm, pulse ox (continuous), telemetry, peripheral IV upon OT entry to room.    General  Precautions: Standard, fall   Orthopedic Precautions:N/A   Braces: N/A  Respiratory Status: Room air    Occupational Performance:    Bed Mobility:    · Patient completed Rolling/Turning to Right with contact guard assistance  · Patient completed Scooting/Bridging with contact guard assistance  · Patient completed Supine to Sit with contact guard assistance  · Patient completed Sit to Supine with contact guard assistance    Functional Mobility/Transfers:  · Patient completed Sit <> Stand Transfer with minimum assistance  with  rolling walker     Activities of Daily Living:  · Grooming: contact guard assistance    · Upper Body Dressing: minimum assistance    · Lower Body Dressing: moderate assistance      Cognitive/Visual Perceptual:  Cognitive/Psychosocial Skills:     -       Oriented to: Person and Situation   -       Follows Commands/attention:Follows two-step commands  -       Communication: clear/fluent  -       Memory: Impaired STM  -       Safety awareness/insight to disability: impaired   -       Mood/Affect/Coping skills/emotional control: Cooperative    Physical Exam:  Balance:    -       fair standing balance  Upper Extremity Range of Motion:     -       Right Upper Extremity: WFL  -       Left Upper Extremity: WFL  Upper Extremity Strength:    -       Right Upper Extremity: 4-/5  -       Left Upper Extremity: 4-/5   Strength:    -       Right Upper Extremity: WFL  -       Left Upper Extremity: WFL    AMPAC 6 Click ADL:  AMPAC Total Score:      Treatment & Education:  Role of OT, safety awareness, importance of OOB activity, call bell use.   Education:    Patient left HOB elevated with all lines intact, call button in reach and bed alarm on    GOALS:   Multidisciplinary Problems     Occupational Therapy Goals        Problem: Occupational Therapy    Goal Priority Disciplines Outcome Interventions   Occupational Therapy Goal     OT, PT/OT     Description: Goals to be met by: 8/8/2022     Patient will  increase functional independence with ADLs by performing:    UE Dressing with Supervision.  LE Dressing with Supervision.  Grooming while standing at sink with Contact Guard Assistance.  Toileting from toilet with Contact Guard Assistance for hygiene and clothing management.   Toilet transfer to toilet with Contact Guard Assistance.                     History:     Past Medical History:   Diagnosis Date    Hypercholesteremia     Hypertension     Vitamin D deficiency        Past Surgical History:   Procedure Laterality Date    COLONOSCOPY      HERNIA REPAIR         Time Tracking:     OT Date of Treatment: 07/08/22  OT Start Time: 1136  OT Stop Time: 1149  OT Total Time (min): 13 min    Billable Minutes:Evaluation 5  Self Care/Home Management 8    7/8/2022

## 2022-07-08 NOTE — PROGRESS NOTES
PASRR and 142 received and scanned into media, as backup if [facility] unable to accept and patient needs placement at a nursing facility.

## 2022-07-08 NOTE — PLAN OF CARE
met with patient at bedside to explain the MOON. Patient verbalized his understanding of the EDOUARD and signed. EDOUARD scanned into media.       07/08/22 1050   EDOUARD Message   Medicare Outpatient and Observation Notification regarding financial responsibility Explained to patient/caregiver;Signed/date by patient/caregiver   Date EDOUARD was signed 07/08/22   Time EDOUARD was signed 1050

## 2022-07-08 NOTE — PROGRESS NOTES
"[facility]    Progress Note    Patient Name: Amilcar Phillip  MRN: [id number]  Patient Class: OP- Observation   Admission Date: 7/7/2022  9:35 AM  Length of Stay: 0  Attending Physician: Andrew Og MD  Primary Care Provider: Angel Edwards Jr, MD  Face-to-Face encounter date: 07/08/2022  Code status:  Chief Complaint: Fall (Unsure of timeline - only thing patient remembers is calling for people to come help - there is redness on both knees, nothing on abdomen or back. )        Subjective:    HPI:Andrey Estrada MD   76 year old male with history of HTN, Cognitive decline, and has had 2/3 COVID vaccinations was brought to ED after being found this AM down on the floor of his room in the assisted living center he lives in. He stated that last PM he got up from bed and then found himself on the floor. He "Thinks" he fell, but does not remember exactly what happened. He recollect calling out for help, but "No one came". He laid on the floor all PM. He denied any pain. No CP or SOB. No palpitations. No HA, vision changes or dizziness. No abdominal pain. The patient is very soft spoken. One must put ear close to his head to hear his speech. ED provider stated that when she tried to stand the patient he was "Very unstable on his feet". Discussed with provider: will order PT eval to see if patient requires higher level of care than assisted living.     In ED: labs reviewed: leukocytosis, with mild normocytic anemia; normal electrolytes, renal function and transaminases; normal BNP and troponin; CPK is elevated, D-dimer is elevated, and lactate is normal. CTA: chest: no PE. CT Head no acute pathology. C-spine: no fracture. Pelvis: no fracture.  CXR reviewed: NAPD. EKG reviewed: sinus with old inferior, but no acute segments. Discussed with ED provider: admit for telemetry monitoring for possible syncope, and gentle hydration for rhabdomyolysis.      Interval History:   7/8:  Patient " "is significantly weak, PT /OT.  CT PE negative for PE.  Continue IV fluids, CPK trending downwards. No concerns/issues overnight reported by the patient or the nursing staff.    Review of Systems All other Review of Systems were found to be negative expect for that mentioned already in HPI.     Objective:     Vitals:    07/07/22 2305 07/08/22 0351 07/08/22 0828 07/08/22 1159   BP:  (!) 141/90 (!) 159/78 129/78   BP Location:  Left arm     Patient Position:  Lying     Pulse:  65 62 66   Resp:  16 18 18   Temp:  97.8 °F (36.6 °C) 98.2 °F (36.8 °C) 98.8 °F (37.1 °C)   TempSrc:  Oral Oral Oral   SpO2:  95% 98% 98%   Weight: 67.4 kg (148 lb 9.4 oz)      Height: 5' 7" (1.702 m)           Vitals reviewed.  Constitutional: No distress.   HENT: NC  Head: Atraumatic.   Cardiovascular: Normal rate, regular rhythm and normal heart sounds.   Pulmonary/Chest: Effort normal. No wheezes.   Abdominal: Soft. Bowel sounds are normal. No distension and no mass. No tenderness  Neurological: Alert.   Skin: Skin is warm and dry.   Psych: Appropriate mood and affect    Following labs were Reviewed   CBC:  Recent Labs   Lab 07/08/22  0717   WBC 8.80   HGB 12.0*   HCT 36.2*        CMP:  Recent Labs   Lab 07/07/22  1436 07/08/22  0538   CALCIUM 9.2 9.1   ALBUMIN 4.2  --    PROT 7.0  --     139   K 3.5 3.6   CO2 28 26    103   BUN 15 16   CREATININE 0.7 0.7   ALKPHOS 74  --    ALT 17  --    AST 26  --    BILITOT 1.1*  --        Micro Results  Microbiology Results (last 7 days)     Procedure Component Value Units Date/Time    Blood Culture #1 **CANNOT BE ORDERED STAT** [[id number]] Collected: 07/07/22 1436    Order Status: Completed Specimen: Blood from Peripheral, Antecubital, Right Updated: 07/08/22 1250     Blood Culture, Routine Gram stain yo bottle: Gram positive cocci      Results called to and read back by:Tricia Baker RN-[address] [place] [address];        07/08/2022  12:50 CJD    Blood Culture #2 **CANNOT BE ORDERED STAT** " [[id number]] Collected: 07/07/22 1442    Order Status: Completed Specimen: Blood from Peripheral, Antecubital, Left Updated: 07/07/22 2117     Blood Culture, Routine No Growth to date           Radiology Reports  CT Head Without Contrast  Result Date: 7/7/2022   IMPRESSION: 1. No acute intracranial abnormality. 2. Chronic and involutional changes of the brain. Electronically signed by:  Augie Smith DO  7/7/2022 12:42 PM CDT Workstation: [id number]    CTA Chest Non-Coronary (PE Study)  Result Date: 7/7/2022  IMPRESSION: No CT evidence of pulmonary emboli No acute pulmonary process Mild compression of the superior endplate of T7 the age of which is indeterminate Tiny gallstones. Electronically signed by:  Savannah Arango MD  7/7/2022 3:58 PM CDT Workstation: [id number]    CT Cervical Spine Without Contrast  Result Date: 7/7/2022  IMPRESSION: No evidence of cervical spine fracture or subluxation Mild disc space narrowing at C4-5 Electronically signed by:  Savannah Arango MD  7/7/2022 12:43 PM CDT Workstation: [id number]    X-Ray Chest AP Portable  Result Date: 7/7/2022   IMPRESSION: No acute pulmonary process. Electronically signed by:  Savannah Arango MD  7/7/2022 12:51 PM CDT Workstation: [id number]    X-Ray Pelvis Routine AP  Result Date: 7/7/2022  IMPRESSION: Negative for acute fracture or dislocation. Electronically signed by:  Alejo Ramirez MD  7/7/2022 1:17 PM CDT Workstation: [id number]       Meds  Scheduled Meds:   atorvastatin  10 mg Oral QHS    cholecalciferol (vitamin D3)  5,000 Units Oral Daily    donepeziL  10 mg Oral QHS    enoxaparin  40 mg Subcutaneous Daily    meloxicam  15 mg Oral Daily    multivitamin  1 tablet Oral Daily    OXcarbazepine  150 mg Oral BID    risperiDONE  0.25 mg Oral QHS    sertraline  50 mg Oral QHS     Continuous Infusions:   lactated ringers 100 mL/hr at 07/08/22 0820     PRN Meds:.acetaminophen, calcium chloride IVPB, calcium chloride IVPB, calcium chloride IVPB,  magnesium oxide, magnesium sulfate IVPB, magnesium sulfate IVPB, magnesium sulfate IVPB, magnesium sulfate IVPB, melatonin, ondansetron, potassium chloride, potassium chloride, potassium chloride, potassium chloride, sodium phosphate IVPB, sodium phosphate IVPB, sodium phosphate IVPB, sodium phosphate IVPB, sodium phosphate IVPB, traMADoL.    Assessment & Plan:   * Syncope with normal neurologic examination  Resolved  Continue to monitor        Physical deconditioning  PT/ot        Traumatic rhabdomyolysis  Gentle hydration      Discharge Planning:   Is the patient medically ready for discharge?: no    Reason for patient still in hospital (select all that apply): Patient trending condition and Treatment    Above encounter included review of the medical records, interviewing and examining the patient face-to-face, discussion with family and other health care providers, ordering and interpreting lab/test results and formulating a plan of care.     Medical Decision Making:      [_] Low Complexity  [_] Moderate Complexity  [x] High Complexity      Andrew Og MD  Department of Hospital Medicine   [facility]

## 2022-07-08 NOTE — PLAN OF CARE
contacted by Muriel with [facility]. Patient accepted to [facility] pending bed availability. Will review again on Monday.       07/08/22 1610   Post-Acute Status   Post-Acute Authorization Placement   Post-Acute Placement Status Pending Bed Availability

## 2022-07-08 NOTE — PT/OT/SLP EVAL
"Physical Therapy Evaluation    Patient Name:  Amilcar Phillip   MRN:  [id number]    Recommendations:     Discharge Recommendations:  home health PT, assisted living facility, nursing facility, skilled (pending pt progress towards goals)   Discharge Equipment Recommendations:  (TBD)   Barriers to discharge: None    Assessment:     Amilcar Phillip is a 76 y.o. male admitted with a medical diagnosis of Syncope with normal neurologic examination.  He presents with the following impairments/functional limitations:  weakness, impaired endurance, impaired self care skills, impaired functional mobilty, gait instability, impaired balance, decreased safety awareness, impaired cognition.    Pt presents supine in bed with HOB elevated, agreeable to PT. Pt performed bed mobility with min A and sit to stand with min A x 2 due to posterior lean. Pt speaks very softly and appears very stiff in trunk during mobility. Pt was returned to supine due to feeling fatigued. All needs in reach.    Rehab Prognosis: Fair; patient would benefit from acute skilled PT services to address these deficits and reach maximum level of function.    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  Prior to admission, patients level of function was mod I.  Equipment used at home: walker, rolling, cane, straight.  DME owned (not currently used): none.  Upon discharge, patient will have assistance from facility.    Objective:     Communicated with RN prior to session.  Patient found HOB " elevated with bed alarm, telemetry, peripheral IV  upon PT entry to room.    General Precautions: Standard, fall   Orthopedic Precautions:N/A   Braces: N/A  Respiratory Status: Room air    Exams:  · Cognitive Exam:  Patient is oriented to Person and Time  · Sensation: -       Intact  light/touch B LEs  · RLE ROM: WFL  · RLE Strength: WFL  · LLE ROM: WFL  · LLE Strength: WFL    Functional Mobility:  · Bed Mobility:     · Supine to Sit: minimum assistance, very stiff trunk, forward flexed posture in sitting  · Sit to Supine: minimum assistance  · Transfers:     · Sit to Stand:  minimum assistance and of 2 persons with rolling walker  · Gait: 3 side steps EOB with RW and min A, unsteady, posterior lean    Therapeutic Activities and Exercises:   Education, poc, dc planning, fall prevention.    AM-PAC 6 CLICK MOBILITY  Total Score:14     Patient left supine with all lines intact, call button in reach and bed alarm on.    GOALS:   Multidisciplinary Problems     Physical Therapy Goals        Problem: Physical Therapy    Goal Priority Disciplines Outcome Goal Variances Interventions   Physical Therapy Goal     PT, PT/OT      Description: Goals to be met by: 2022     Patient will increase functional independence with mobility by performin. Supine to sit with Stand-by Assistance  2. Sit to supine with Stand-by Assistance  3. Sit to stand transfer with Stand-by Assistance  4. Gait  x 100 feet with Contact Guard Assistance using Rolling Walker.                      History:     Past Medical History:   Diagnosis Date    Hypercholesteremia     Hypertension     Vitamin D deficiency        Past Surgical History:   Procedure Laterality Date    COLONOSCOPY      HERNIA REPAIR         Time Tracking:     PT Received On: 22  PT Start Time: 1108     PT Stop Time: 1125  PT Total Time (min): 17 min     Billable Minutes: Evaluation 17      2022

## 2022-07-09 LAB
ANION GAP SERPL CALC-SCNC: 4 MMOL/L (ref 8–16)
BACTERIA BLD CULT: ABNORMAL
BASOPHILS # BLD AUTO: 0.02 K/UL (ref 0–0.2)
BASOPHILS NFR BLD: 0.2 % (ref 0–1.9)
BUN SERPL-MCNC: 17 MG/DL (ref 8–23)
CALCIUM SERPL-MCNC: 9 MG/DL (ref 8.7–10.5)
CHLORIDE SERPL-SCNC: 106 MMOL/L (ref 95–110)
CK SERPL-CCNC: 224 U/L (ref 20–200)
CO2 SERPL-SCNC: 30 MMOL/L (ref 23–29)
CREAT SERPL-MCNC: 0.9 MG/DL (ref 0.5–1.4)
DIFFERENTIAL METHOD: ABNORMAL
EOSINOPHIL # BLD AUTO: 0.4 K/UL (ref 0–0.5)
EOSINOPHIL NFR BLD: 4.4 % (ref 0–8)
ERYTHROCYTE [DISTWIDTH] IN BLOOD BY AUTOMATED COUNT: 14.9 % (ref 11.5–14.5)
EST. GFR  (AFRICAN AMERICAN): >60 ML/MIN/1.73 M^2
EST. GFR  (NON AFRICAN AMERICAN): >60 ML/MIN/1.73 M^2
GLUCOSE SERPL-MCNC: 84 MG/DL (ref 70–110)
HCT VFR BLD AUTO: 37.5 % (ref 40–54)
HGB BLD-MCNC: 12.2 G/DL (ref 14–18)
IMM GRANULOCYTES # BLD AUTO: 0.04 K/UL (ref 0–0.04)
IMM GRANULOCYTES NFR BLD AUTO: 0.5 % (ref 0–0.5)
LYMPHOCYTES # BLD AUTO: 1.5 K/UL (ref 1–4.8)
LYMPHOCYTES NFR BLD: 17.3 % (ref 18–48)
MAGNESIUM SERPL-MCNC: 1.9 MG/DL (ref 1.6–2.6)
MCH RBC QN AUTO: 29.6 PG (ref 27–31)
MCHC RBC AUTO-ENTMCNC: 32.5 G/DL (ref 32–36)
MCV RBC AUTO: 91 FL (ref 82–98)
MONOCYTES # BLD AUTO: 1.1 K/UL (ref 0.3–1)
MONOCYTES NFR BLD: 13.2 % (ref 4–15)
NEUTROPHILS # BLD AUTO: 5.5 K/UL (ref 1.8–7.7)
NEUTROPHILS NFR BLD: 64.4 % (ref 38–73)
NRBC BLD-RTO: 0 /100 WBC
PLATELET # BLD AUTO: 252 K/UL (ref 150–450)
PMV BLD AUTO: 10.7 FL (ref 9.2–12.9)
POTASSIUM SERPL-SCNC: 3.9 MMOL/L (ref 3.5–5.1)
RBC # BLD AUTO: 4.12 M/UL (ref 4.6–6.2)
SODIUM SERPL-SCNC: 140 MMOL/L (ref 136–145)
WBC # BLD AUTO: 8.56 K/UL (ref 3.9–12.7)

## 2022-07-09 PROCEDURE — 25000003 PHARM REV CODE 250: Performed by: INTERNAL MEDICINE

## 2022-07-09 PROCEDURE — 82550 ASSAY OF CK (CPK): CPT | Performed by: INTERNAL MEDICINE

## 2022-07-09 PROCEDURE — 12000002 HC ACUTE/MED SURGE SEMI-PRIVATE ROOM

## 2022-07-09 PROCEDURE — 85025 COMPLETE CBC W/AUTO DIFF WBC: CPT | Performed by: INTERNAL MEDICINE

## 2022-07-09 PROCEDURE — 63600175 PHARM REV CODE 636 W HCPCS: Performed by: INTERNAL MEDICINE

## 2022-07-09 PROCEDURE — 97110 THERAPEUTIC EXERCISES: CPT

## 2022-07-09 PROCEDURE — 83735 ASSAY OF MAGNESIUM: CPT | Performed by: STUDENT IN AN ORGANIZED HEALTH CARE EDUCATION/TRAINING PROGRAM

## 2022-07-09 PROCEDURE — 63600175 PHARM REV CODE 636 W HCPCS: Performed by: STUDENT IN AN ORGANIZED HEALTH CARE EDUCATION/TRAINING PROGRAM

## 2022-07-09 PROCEDURE — 92610 EVALUATE SWALLOWING FUNCTION: CPT

## 2022-07-09 PROCEDURE — 97116 GAIT TRAINING THERAPY: CPT

## 2022-07-09 PROCEDURE — 80048 BASIC METABOLIC PNL TOTAL CA: CPT | Performed by: STUDENT IN AN ORGANIZED HEALTH CARE EDUCATION/TRAINING PROGRAM

## 2022-07-09 PROCEDURE — 36415 COLL VENOUS BLD VENIPUNCTURE: CPT | Performed by: INTERNAL MEDICINE

## 2022-07-09 RX ADMIN — SODIUM CHLORIDE, SODIUM LACTATE, POTASSIUM CHLORIDE, AND CALCIUM CHLORIDE: .6; .31; .03; .02 INJECTION, SOLUTION INTRAVENOUS at 04:07

## 2022-07-09 RX ADMIN — THERA TABS 1 TABLET: TAB at 09:07

## 2022-07-09 RX ADMIN — RISPERIDONE 0.25 MG: 0.25 TABLET ORAL at 09:07

## 2022-07-09 RX ADMIN — ATORVASTATIN CALCIUM 10 MG: 10 TABLET, FILM COATED ORAL at 09:07

## 2022-07-09 RX ADMIN — DONEPEZIL HYDROCHLORIDE 10 MG: 5 TABLET, FILM COATED ORAL at 09:07

## 2022-07-09 RX ADMIN — MELOXICAM 15 MG: 7.5 TABLET ORAL at 09:07

## 2022-07-09 RX ADMIN — Medication 5000 UNITS: at 09:07

## 2022-07-09 RX ADMIN — SERTRALINE HYDROCHLORIDE 50 MG: 50 TABLET ORAL at 09:07

## 2022-07-09 RX ADMIN — OXCARBAZEPINE 150 MG: 150 TABLET, FILM COATED ORAL at 09:07

## 2022-07-09 RX ADMIN — POTASSIUM CHLORIDE 20 MEQ: 1500 TABLET, EXTENDED RELEASE ORAL at 09:07

## 2022-07-09 RX ADMIN — ENOXAPARIN SODIUM 40 MG: 40 INJECTION SUBCUTANEOUS at 04:07

## 2022-07-09 NOTE — PLAN OF CARE
Problem: SLP  Goal: SLP Goal  Description:     1. Pt will tolerate easy to chew 7(a) regular diet with set-up/assist from staff as needed, following general swallow safety precautions with staff assist.  2. Pt will participate in modified barium swallow study for further assessment of pharyngeal stage of swallow.  3. Pt will participate in cognitive-linguistic evaluation.  Outcome: Ongoing, Progressing

## 2022-07-09 NOTE — PT/OT/SLP PROGRESS
Physical Therapy Treatment    Patient Name:  Amilcar Phillip   MRN:  [id number]    Recommendations:     Discharge Recommendations:  nursing facility, skilled   Discharge Equipment Recommendations: none   Barriers to discharge: Decreased caregiver support    Assessment:     Amilcar Phillip is a 76 y.o. male admitted with a medical diagnosis of Syncope with normal neurologic examination.  He presents with the following impairments/functional limitations:  weakness, impaired endurance, impaired functional mobilty, gait instability, impaired balance, decreased lower extremity function, decreased safety awareness .    Pt alert and agreeable to PT. presents with low voice and difficult to understand. Pt requiring min/mod assist for mobility to sit EOB, gait with RW 35ft with min/mod assist with forward head/kyphotic posture, slow and shuffling gait. OOB chair with chair alarm active.  Pt demonstaring Parkinsonism symptoms. Pt with episode of drooling 1x. Pt to benefit from ST consult and SNF.    Rehab Prognosis: Fair; patient would benefit from acute skilled PT services to address these deficits and reach maximum level of function.    Recent Surgery: * No surgery found *      Plan:     During this hospitalization, patient to be seen 6 x/week to address the identified rehab impairments via gait training, therapeutic exercises, therapeutic activities and progress toward the following goals:    · Plan of Care Expires:  08/08/22    Subjective   Pt verbalizing and interactive but speech is low and difficult to understand  Chief Complaint: weakness  Patient/Family Comments/goals: none stated  Pain/Comfort:  · Pain Rating 1: 0/10      Objective:     Communicated with nurse Asencio prior to session.  Patient found HOB elevated with bed alarm, telemetry upon PT entry to room.     General Precautions: Standard, fall   Orthopedic Precautions:N/A   Braces:    Respiratory Status: Room air     Functional Mobility:  · Bed  Mobility:     · Rolling Right: minimum assistance  · Scooting: minimum assistance  · Supine to Sit: minimum assistance  · Transfers:     · Sit to Stand:  minimum assistance and moderate assistance with rolling walker  · Bed to Chair: minimum assistance with  rolling walker  using  Stand Pivot  · Gait: 35ft with RW min/mod shuffling gait with forward head and trunk posture/kyphotic      AM-PAC 6 CLICK MOBILITY          Therapeutic Activities and Exercises:  thera ex with LAQ/marches and AP  OOB chair post PT with tray table in front       Patient left up in chair with all lines intact, call button in reach, chair alarm on and Dr Og present..    GOALS:   Multidisciplinary Problems     Physical Therapy Goals        Problem: Physical Therapy    Goal Priority Disciplines Outcome Goal Variances Interventions   Physical Therapy Goal     PT, PT/OT Ongoing, Progressing     Description: Goals to be met by: 2022     Patient will increase functional independence with mobility by performin. Supine to sit with Stand-by Assistance  2. Sit to supine with Stand-by Assistance  3. Sit to stand transfer with Stand-by Assistance  4. Gait  x 100 feet with Contact Guard Assistance using Rolling Walker.                      Time Tracking:     PT Received On: 22  PT Start Time: 958     PT Stop Time: 1025  PT Total Time (min): 27 min     Billable Minutes: Gait Training 17 and Therapeutic Exercise 10    Treatment Type: Treatment  PT/PTA: PT     PTA Visit Number: 0     2022

## 2022-07-09 NOTE — CONSULTS
"[facility]  Adult Nutrition   Consult Note (Initial Assessment)     SUMMARY     Recommendations  Recommendation/Intervention: 1. Continue diet as tolerated by patient 2.  to assist with meal choices daily  Goals: 1. Patient to meet at least 75% of estimated needs via oral inake of meals  Nutrition Goal Status: new    Dietitian Rounds Brief    Pt seen for consult. Currently awaiting rehab. Nutritional care discussed with RN. Reports appetite/intake good. LBM 7/9. No needs at this time.       Reason for Assessment  Reason For Assessment: consult  Diagnosis: other (see comments) (syncope)  Relevant Medical History: HTN, elevated cholesterol  Interdisciplinary Rounds: did not attend    Nutrition Risk Screen  Nutrition Risk Screen: no indicators present     MST Score: 0  Have you recently lost weight without trying?: No  Weight loss score: 0  Have you been eating poorly because of a decreased appetite?: No  Appetite score: 0       Nutrition/Diet History  Spiritual, Cultural Beliefs, Judaism Practices, Values that Affect Care: no  Food Allergies: NKFA  Factors Affecting Nutritional Intake: None identified at this time    Anthropometrics  Temp: 97.2 °F (36.2 °C)  Height Method: Stated  Height: 5' 7" (170.2 cm)  Height (inches): 67 in  Weight Method: Bed Scale  Weight: 67.4 kg (148 lb 9.4 oz)  Weight (lb): 148.59 lb  Ideal Body Weight (IBW), Male: 148 lb  % Ideal Body Weight, Male (lb): 100.4 %  BMI (Calculated): 23.3  BMI Grade: 18.5-24.9 - normal       Weight History:  Wt Readings from Last 10 Encounters:   07/09/22 67.4 kg (148 lb 9.4 oz)   06/09/22 70.3 kg (155 lb)   03/29/22 70.3 kg (155 lb)   02/22/22 75.8 kg (167 lb)   01/11/22 75.8 kg (167 lb)   12/23/21 75.8 kg (167 lb)   12/21/21 81.6 kg (180 lb)   10/21/21 81.6 kg (180 lb)   08/26/21 81.6 kg (180 lb)   01/11/21 84.4 kg (186 lb)       Lab/Procedures/Meds: Pertinent Labs Reviewed    Clinical Chemistry:  Recent Labs   Lab 07/07/22  1436 " 07/08/22  0538 07/09/22  0430      < > 140   K 3.5   < > 3.9      < > 106   CO2 28   < > 30*      < > 84   BUN 15   < > 17   CREATININE 0.7   < > 0.9   CALCIUM 9.2   < > 9.0   PROT 7.0  --   --    ALBUMIN 4.2  --   --    BILITOT 1.1*  --   --    ALKPHOS 74  --   --    AST 26  --   --    ALT 17  --   --    ANIONGAP 8   < > 4*   ESTGFRAFRICA >60.0   < > >60.0   EGFRNONAA >60.0   < > >60.0   MG  --   --  1.9    < > = values in this interval not displayed.       CBC:   Recent Labs   Lab 07/09/22 0430   WBC 8.56   RBC 4.12*   HGB 12.2*   HCT 37.5*      MCV 91   MCH 29.6   MCHC 32.5       Cardiac Profile:  Recent Labs   Lab 07/07/22  1049 07/07/22  1436 07/08/22  0538 07/09/22  0430   BNP 78  --   --   --    CPK  --  610* 404* 224*   TROPONINI  --  <0.030  --   --          Medications: Pertinent Medications reviewed    Scheduled Meds:   atorvastatin  10 mg Oral QHS    cholecalciferol (vitamin D3)  5,000 Units Oral Daily    donepeziL  10 mg Oral QHS    enoxaparin  40 mg Subcutaneous Daily    meloxicam  15 mg Oral Daily    multivitamin  1 tablet Oral Daily    OXcarbazepine  150 mg Oral BID    risperiDONE  0.25 mg Oral QHS    sertraline  50 mg Oral QHS       Continuous Infusions:   lactated ringers 100 mL/hr at 07/08/22 1430       PRN Meds:.acetaminophen, calcium chloride IVPB, calcium chloride IVPB, calcium chloride IVPB, magnesium oxide, magnesium sulfate IVPB, magnesium sulfate IVPB, magnesium sulfate IVPB, magnesium sulfate IVPB, melatonin, ondansetron, potassium chloride, potassium chloride, potassium chloride, potassium chloride, sodium phosphate IVPB, sodium phosphate IVPB, sodium phosphate IVPB, sodium phosphate IVPB, sodium phosphate IVPB, traMADoL    Estimated/Assessed Needs  Weight Used For Calorie Calculations: 67.4 kg (148 lb 9.4 oz)  Energy Calorie Requirements (kcal): 0689-2457 kcals (25-30 kcal/kg)  Energy Need Method: Kcal/kg  Protein Requirements: 67-81 g (1-1.2  g/kg)  Weight Used For Protein Calculations: 67.4 kg (148 lb 9.4 oz)     Estimated Fluid Requirement Method: RDA Method  RDA Method (mL): 1685       Nutrition Prescription Ordered  Current Diet Order: cardiac    Evaluation of Received Nutrient/Fluid Intake  Energy Calories Required: meeting needs  Protein Required: meeting needs  Fluid Required: meeting needs  Tolerance: tolerating     Intake/Output Summary (Last 24 hours) at 7/9/2022 0925  Last data filed at 7/8/2022 1600  Gross per 24 hour   Intake 1200 ml   Output 100 ml   Net 1100 ml        % Meal Intake: 75 - 100 %    Nutrition Risk  Level of Risk/Frequency of Follow-up: moderate     Monitor and Evaluation  Food and Nutrient Intake: energy intake, food and beverage intake  Food and Nutrient Adminstration: diet order  Physical Activity and Function: nutrition-related ADLs and IADLs  Anthropometric Measurements: weight change, weight, body mass index  Biochemical Data, Medical Tests and Procedures: electrolyte and renal panel, gastrointestinal profile, glucose/endocrine profile  Nutrition-Focused Physical Findings: overall appearance     Nutrition Follow-Up  RD Follow-up?: Yes    Ursula Whaley RD 07/09/2022 9:25 AM

## 2022-07-09 NOTE — PROGRESS NOTES
"[facility]    Progress Note    Patient Name: Amilcar Phillip  MRN: [id number]  Patient Class: IP- Inpatient   Admission Date: 7/7/2022  9:35 AM  Length of Stay: 1  Attending Physician: Andrew Og MD  Primary Care Provider: Angel Edwards Jr, MD  Face-to-Face encounter date: 07/09/2022  Code status:  Chief Complaint: Fall (Unsure of timeline - only thing patient remembers is calling for people to come help - there is redness on both knees, nothing on abdomen or back. )        Subjective:    HPI:Andrey Estrada MD   76 year old male with history of HTN, Cognitive decline, and has had 2/3 COVID vaccinations was brought to ED after being found this AM down on the floor of his room in the assisted living center he lives in. He stated that last PM he got up from bed and then found himself on the floor. He "Thinks" he fell, but does not remember exactly what happened. He recollect calling out for help, but "No one came". He laid on the floor all PM. He denied any pain. No CP or SOB. No palpitations. No HA, vision changes or dizziness. No abdominal pain. The patient is very soft spoken. One must put ear close to his head to hear his speech. ED provider stated that when she tried to stand the patient he was "Very unstable on his feet". Discussed with provider: will order PT eval to see if patient requires higher level of care than assisted living.     In ED: labs reviewed: leukocytosis, with mild normocytic anemia; normal electrolytes, renal function and transaminases; normal BNP and troponin; CPK is elevated, D-dimer is elevated, and lactate is normal. CTA: chest: no PE. CT Head no acute pathology. C-spine: no fracture. Pelvis: no fracture.  CXR reviewed: NAPD. EKG reviewed: sinus with old inferior, but no acute segments. Discussed with ED provider: admit for telemetry monitoring for possible syncope, and gentle hydration for rhabdomyolysis.      Interval History:   7/8:  Patient is " "significantly weak, PT /OT.  CT PE negative for PE.  Continue IV fluids, CPK trending downwards. No concerns/issues overnight reported by the patient or the nursing staff.    7/9:  Patient ambulating with therapy and is asking when he can go home.  CPK trending downwards, continue IV fluids.    Review of Systems All other Review of Systems were found to be negative expect for that mentioned already in HPI.     Objective:     Vitals:    07/09/22 0400 07/09/22 0804 07/09/22 0922 07/09/22 1254   BP: (!) 174/79 (!) 188/92  113/69   BP Location: Left arm      Patient Position: Lying      Pulse: 62 68  68   Resp: 18 18  18   Temp: 97.4 °F (36.3 °C) 97.2 °F (36.2 °C)  97.6 °F (36.4 °C)   TempSrc: Oral      SpO2: 96% 96%  96%   Weight:   67.4 kg (148 lb 9.4 oz)    Height:   5' 7" (1.702 m)         Vitals reviewed.  Constitutional: No distress.   HENT: NC  Head: Atraumatic.   Cardiovascular: Normal rate, regular rhythm and normal heart sounds.   Pulmonary/Chest: Effort normal. No wheezes.   Abdominal: Soft. Bowel sounds are normal. No distension and no mass. No tenderness  Neurological: Alert.   Skin: Skin is warm and dry.   Psych: Appropriate mood and affect    Following labs were Reviewed   CBC:  Recent Labs   Lab 07/09/22  0430   WBC 8.56   HGB 12.2*   HCT 37.5*        CMP:  Recent Labs   Lab 07/09/22  0430   CALCIUM 9.0      K 3.9   CO2 30*      BUN 17   CREATININE 0.9       Micro Results  Microbiology Results (last 7 days)     Procedure Component Value Units Date/Time    Blood Culture #2 **CANNOT BE ORDERED STAT** [[id number]] Collected: 07/07/22 1442    Order Status: Completed Specimen: Blood from Peripheral, Antecubital, Left Updated: 07/09/22 1632     Blood Culture, Routine No Growth to date      No Growth to date      No Growth to date    Blood culture [[id number]] Collected: 07/08/22 1351    Order Status: Completed Specimen: Blood Updated: 07/09/22 1632     Blood Culture, Routine No Growth to date "      No Growth to date    Blood Culture #1 **CANNOT BE ORDERED STAT** [[id number]]  (Abnormal) Collected: 07/07/22 1436    Order Status: Completed Specimen: Blood from Peripheral, Antecubital, Right Updated: 07/09/22 1020     Blood Culture, Routine Gram stain yo bottle: Gram positive cocci      Results called to and read back by:Tricia Baker RN-12[facility];        07/08/2022  12:50 CJD      COAGULASE-NEGATIVE STAPHYLOCOCCUS SPECIES  Organism is a probable contaminant             Radiology Reports  CT Head Without Contrast  Result Date: 7/7/2022   IMPRESSION: 1. No acute intracranial abnormality. 2. Chronic and involutional changes of the brain. Electronically signed by:  Augie Smith DO  7/7/2022 12:42 PM CDT Workstation: [id number]    CTA Chest Non-Coronary (PE Study)  Result Date: 7/7/2022  IMPRESSION: No CT evidence of pulmonary emboli No acute pulmonary process Mild compression of the superior endplate of T7 the age of which is indeterminate Tiny gallstones. Electronically signed by:  Savannah Arango MD  7/7/2022 3:58 PM CDT Workstation: [id number]    CT Cervical Spine Without Contrast  Result Date: 7/7/2022  IMPRESSION: No evidence of cervical spine fracture or subluxation Mild disc space narrowing at C4-5 Electronically signed by:  Savannah Arango MD  7/7/2022 12:43 PM CDT Workstation: [id number]    X-Ray Chest AP Portable  Result Date: 7/7/2022   IMPRESSION: No acute pulmonary process. Electronically signed by:  Savannah Arango MD  7/7/2022 12:51 PM CDT Workstation: [id number]    X-Ray Pelvis Routine AP  Result Date: 7/7/2022  IMPRESSION: Negative for acute fracture or dislocation. Electronically signed by:  Alejo Ramirez MD  7/7/2022 1:17 PM CDT Workstation: [id number]       Meds  Scheduled Meds:   atorvastatin  10 mg Oral QHS    cholecalciferol (vitamin D3)  5,000 Units Oral Daily    donepeziL  10 mg Oral QHS    enoxaparin  40 mg Subcutaneous Daily    meloxicam  15 mg Oral Daily    multivitamin  1  tablet Oral Daily    OXcarbazepine  150 mg Oral BID    risperiDONE  0.25 mg Oral QHS    sertraline  50 mg Oral QHS     Continuous Infusions:   lactated ringers 100 mL/hr at 07/09/22 1613     PRN Meds:.acetaminophen, calcium chloride IVPB, calcium chloride IVPB, calcium chloride IVPB, magnesium oxide, magnesium sulfate IVPB, magnesium sulfate IVPB, magnesium sulfate IVPB, magnesium sulfate IVPB, melatonin, ondansetron, potassium chloride, potassium chloride, potassium chloride, potassium chloride, sodium phosphate IVPB, sodium phosphate IVPB, sodium phosphate IVPB, sodium phosphate IVPB, sodium phosphate IVPB, traMADoL.    Assessment & Plan:   * Syncope with normal neurologic examination  Resolved  Continue to monitor        Physical deconditioning  PT/ot        Traumatic rhabdomyolysis  Gentle hydration      Discharge Planning:   Is the patient medically ready for discharge?: no    Reason for patient still in hospital (select all that apply): Patient trending condition and Treatment    Above encounter included review of the medical records, interviewing and examining the patient face-to-face, discussion with family and other health care providers, ordering and interpreting lab/test results and formulating a plan of care.     Medical Decision Making:      [_] Low Complexity  [_] Moderate Complexity  [x] High Complexity      Andrew Og MD  Department of Hospital Medicine   [facility] hospital

## 2022-07-09 NOTE — PLAN OF CARE
Problem: Physical Therapy  Goal: Physical Therapy Goal  Description: Goals to be met by: 2022     Patient will increase functional independence with mobility by performin. Supine to sit with Stand-by Assistance  2. Sit to supine with Stand-by Assistance  3. Sit to stand transfer with Stand-by Assistance  4. Gait  x 100 feet with Contact Guard Assistance using Rolling Walker.     Outcome: Ongoing, Progressing   Pt is alert and agreeable to PT. Demonstrating Parkinsonism with mask facie, rigidity,shuffling gait, low speech/drooling. Pt ambulated with RW 35 ft with min/mod assist . OOB chair. Will benefit from ST consult. SNF appropriate

## 2022-07-10 PROCEDURE — 63600175 PHARM REV CODE 636 W HCPCS: Performed by: INTERNAL MEDICINE

## 2022-07-10 PROCEDURE — 25000003 PHARM REV CODE 250: Performed by: INTERNAL MEDICINE

## 2022-07-10 PROCEDURE — 12000002 HC ACUTE/MED SURGE SEMI-PRIVATE ROOM

## 2022-07-10 RX ADMIN — OXCARBAZEPINE 150 MG: 150 TABLET, FILM COATED ORAL at 09:07

## 2022-07-10 RX ADMIN — ENOXAPARIN SODIUM 40 MG: 40 INJECTION SUBCUTANEOUS at 05:07

## 2022-07-10 RX ADMIN — DONEPEZIL HYDROCHLORIDE 10 MG: 5 TABLET, FILM COATED ORAL at 09:07

## 2022-07-10 RX ADMIN — MELOXICAM 15 MG: 7.5 TABLET ORAL at 09:07

## 2022-07-10 RX ADMIN — Medication 5000 UNITS: at 09:07

## 2022-07-10 RX ADMIN — ATORVASTATIN CALCIUM 10 MG: 10 TABLET, FILM COATED ORAL at 09:07

## 2022-07-10 RX ADMIN — SERTRALINE HYDROCHLORIDE 50 MG: 50 TABLET ORAL at 09:07

## 2022-07-10 RX ADMIN — THERA TABS 1 TABLET: TAB at 09:07

## 2022-07-10 RX ADMIN — RISPERIDONE 0.25 MG: 0.25 TABLET ORAL at 09:07

## 2022-07-10 NOTE — PROGRESS NOTES
"[facility]    Progress Note    Patient Name: Amilcar Phillip  MRN: [id number]  Patient Class: IP- Inpatient   Admission Date: 7/7/2022  9:35 AM  Length of Stay: 2  Attending Physician: Andrew Og MD  Primary Care Provider: Angel Edwards Jr, MD  Face-to-Face encounter date: 07/10/2022  Code status:  Chief Complaint: Fall (Unsure of timeline - only thing patient remembers is calling for people to come help - there is redness on both knees, nothing on abdomen or back. )        Subjective:    HPI:Andrey Estrada MD   76 year old male with history of HTN, Cognitive decline, and has had 2/3 COVID vaccinations was brought to ED after being found this AM down on the floor of his room in the assisted living center he lives in. He stated that last PM he got up from bed and then found himself on the floor. He "Thinks" he fell, but does not remember exactly what happened. He recollect calling out for help, but "No one came". He laid on the floor all PM. He denied any pain. No CP or SOB. No palpitations. No HA, vision changes or dizziness. No abdominal pain. The patient is very soft spoken. One must put ear close to his head to hear his speech. ED provider stated that when she tried to stand the patient he was "Very unstable on his feet". Discussed with provider: will order PT eval to see if patient requires higher level of care than assisted living.     In ED: labs reviewed: leukocytosis, with mild normocytic anemia; normal electrolytes, renal function and transaminases; normal BNP and troponin; CPK is elevated, D-dimer is elevated, and lactate is normal. CTA: chest: no PE. CT Head no acute pathology. C-spine: no fracture. Pelvis: no fracture.  CXR reviewed: NAPD. EKG reviewed: sinus with old inferior, but no acute segments. Discussed with ED provider: admit for telemetry monitoring for possible syncope, and gentle hydration for rhabdomyolysis.      Interval History:   7/8:  Patient is " significantly weak, PT /OT.  CT PE negative for PE.  Continue IV fluids, CPK trending downwards. No concerns/issues overnight reported by the patient or the nursing staff.    7/9:  Patient ambulating with therapy and is asking when he can go home.  CPK trending downwards, continue IV fluids.    7/10:  No acute overnight changes    Review of Systems All other Review of Systems were found to be negative expect for that mentioned already in HPI.     Objective:     Vitals:    07/09/22 2350 07/10/22 0400 07/10/22 0740 07/10/22 1131   BP: (!) 165/88 (!) 150/90 (!) 148/97  Comment: nurse notified 105/65   BP Location:       Patient Position:       Pulse: (!) 59 78 80 73   Resp: 18 18 18 18   Temp: 97.8 °F (36.6 °C) 97.8 °F (36.6 °C) 98 °F (36.7 °C) 98.2 °F (36.8 °C)   TempSrc:   Oral Oral   SpO2: 96% 98% 97% 97%   Weight:       Height:            Vitals reviewed.  Constitutional: No distress.   HENT: NC  Head: Atraumatic.   Cardiovascular: Normal rate, regular rhythm and normal heart sounds.   Pulmonary/Chest: Effort normal. No wheezes.   Abdominal: Soft. Bowel sounds are normal. No distension and no mass. No tenderness  Neurological: Alert.   Skin: Skin is warm and dry.   Psych: Appropriate mood and affect    Following labs were Reviewed   CBC:  No results for input(s): WBC, HGB, HCT, PLT in the last 24 hours.  CMP:  No results for input(s): GLUCOSE, CALCIUM, ALBUMIN, PROT, NA, K, CO2, CL, BUN, CREATININE, ALKPHOS, ALT, AST, BILITOT in the last 24 hours.    Micro Results  Microbiology Results (last 7 days)     Procedure Component Value Units Date/Time    Blood Culture #2 **CANNOT BE ORDERED STAT** [[id number]] Collected: 07/07/22 1442    Order Status: Completed Specimen: Blood from Peripheral, Antecubital, Left Updated: 07/09/22 1632     Blood Culture, Routine No Growth to date      No Growth to date      No Growth to date    Blood culture [[id number]] Collected: 07/08/22 1351    Order Status: Completed Specimen: Blood  Updated: 07/09/22 1632     Blood Culture, Routine No Growth to date      No Growth to date    Blood Culture #1 **CANNOT BE ORDERED STAT** [[id number]]  (Abnormal) Collected: 07/07/22 1436    Order Status: Completed Specimen: Blood from Peripheral, Antecubital, Right Updated: 07/09/22 1020     Blood Culture, Routine Gram stain yo bottle: Gram positive cocci      Results called to and read back by:Tricia Baker RN-[address] [place] [address];        07/08/2022  12:50 CJD      COAGULASE-NEGATIVE STAPHYLOCOCCUS SPECIES  Organism is a probable contaminant             Radiology Reports  CT Head Without Contrast  Result Date: 7/7/2022   IMPRESSION: 1. No acute intracranial abnormality. 2. Chronic and involutional changes of the brain. Electronically signed by:  Augie Smith DO  7/7/2022 12:42 PM CDT Workstation: [id number]    CTA Chest Non-Coronary (PE Study)  Result Date: 7/7/2022  IMPRESSION: No CT evidence of pulmonary emboli No acute pulmonary process Mild compression of the superior endplate of T7 the age of which is indeterminate Tiny gallstones. Electronically signed by:  Savannah Arango MD  7/7/2022 3:58 PM CDT Workstation: [id number]    CT Cervical Spine Without Contrast  Result Date: 7/7/2022  IMPRESSION: No evidence of cervical spine fracture or subluxation Mild disc space narrowing at C4-5 Electronically signed by:  Savannah Arango MD  7/7/2022 12:43 PM CDT Workstation: [id number]    X-Ray Chest AP Portable  Result Date: 7/7/2022   IMPRESSION: No acute pulmonary process. Electronically signed by:  Savannah Arango MD  7/7/2022 12:51 PM CDT Workstation: [id number]    X-Ray Pelvis Routine AP  Result Date: 7/7/2022  IMPRESSION: Negative for acute fracture or dislocation. Electronically signed by:  Alejo Ramirez MD  7/7/2022 1:17 PM CDT Workstation: [id number]       Meds  Scheduled Meds:   atorvastatin  10 mg Oral QHS    cholecalciferol (vitamin D3)  5,000 Units Oral Daily    donepeziL  10 mg Oral QHS    enoxaparin  40 mg  Subcutaneous Daily    meloxicam  15 mg Oral Daily    multivitamin  1 tablet Oral Daily    OXcarbazepine  150 mg Oral BID    risperiDONE  0.25 mg Oral QHS    sertraline  50 mg Oral QHS     Continuous Infusions:   lactated ringers 100 mL/hr at 07/09/22 1613     PRN Meds:.acetaminophen, calcium chloride IVPB, calcium chloride IVPB, calcium chloride IVPB, magnesium oxide, magnesium sulfate IVPB, magnesium sulfate IVPB, magnesium sulfate IVPB, magnesium sulfate IVPB, melatonin, ondansetron, potassium chloride, potassium chloride, potassium chloride, potassium chloride, sodium phosphate IVPB, sodium phosphate IVPB, sodium phosphate IVPB, sodium phosphate IVPB, sodium phosphate IVPB, traMADoL.    Assessment & Plan:   * Syncope with normal neurologic examination  Resolved  Continue to monitor        Physical deconditioning  PT/ot        Traumatic rhabdomyolysis  Gentle hydration      Discharge Planning:   Is the patient medically ready for discharge?: no    Reason for patient still in hospital (select all that apply): Patient trending condition and Treatment    Above encounter included review of the medical records, interviewing and examining the patient face-to-face, discussion with family and other health care providers, ordering and interpreting lab/test results and formulating a plan of care.     Medical Decision Making:      [_] Low Complexity  [_] Moderate Complexity  [x] High Complexity      Andrew Og MD  Department of Hospital Medicine   [facility]

## 2022-07-11 VITALS
RESPIRATION RATE: 18 BRPM | TEMPERATURE: 98 F | OXYGEN SATURATION: 96 % | BODY MASS INDEX: 23.32 KG/M2 | WEIGHT: 148.56 LBS | HEIGHT: 67 IN | HEART RATE: 69 BPM | DIASTOLIC BLOOD PRESSURE: 87 MMHG | SYSTOLIC BLOOD PRESSURE: 159 MMHG

## 2022-07-11 PROBLEM — T79.6XXA TRAUMATIC RHABDOMYOLYSIS: Status: RESOLVED | Noted: 2022-07-07 | Resolved: 2022-07-11

## 2022-07-11 LAB
ANION GAP SERPL CALC-SCNC: 8 MMOL/L (ref 8–16)
BUN SERPL-MCNC: 13 MG/DL (ref 8–23)
CALCIUM SERPL-MCNC: 9.1 MG/DL (ref 8.7–10.5)
CHLORIDE SERPL-SCNC: 100 MMOL/L (ref 95–110)
CK SERPL-CCNC: 69 U/L (ref 20–200)
CO2 SERPL-SCNC: 26 MMOL/L (ref 23–29)
CREAT SERPL-MCNC: 0.6 MG/DL (ref 0.5–1.4)
ERYTHROCYTE [DISTWIDTH] IN BLOOD BY AUTOMATED COUNT: 14.6 % (ref 11.5–14.5)
EST. GFR  (AFRICAN AMERICAN): >60 ML/MIN/1.73 M^2
EST. GFR  (NON AFRICAN AMERICAN): >60 ML/MIN/1.73 M^2
GLUCOSE SERPL-MCNC: 82 MG/DL (ref 70–110)
HCT VFR BLD AUTO: 42.5 % (ref 40–54)
HGB BLD-MCNC: 14 G/DL (ref 14–18)
MAGNESIUM SERPL-MCNC: 2 MG/DL (ref 1.6–2.6)
MCH RBC QN AUTO: 30.4 PG (ref 27–31)
MCHC RBC AUTO-ENTMCNC: 32.9 G/DL (ref 32–36)
MCV RBC AUTO: 92 FL (ref 82–98)
PLATELET # BLD AUTO: 281 K/UL (ref 150–450)
PMV BLD AUTO: 10.3 FL (ref 9.2–12.9)
POTASSIUM SERPL-SCNC: 3.6 MMOL/L (ref 3.5–5.1)
RBC # BLD AUTO: 4.6 M/UL (ref 4.6–6.2)
SARS-COV-2 RDRP RESP QL NAA+PROBE: NEGATIVE
SODIUM SERPL-SCNC: 134 MMOL/L (ref 136–145)
WBC # BLD AUTO: 8.46 K/UL (ref 3.9–12.7)

## 2022-07-11 PROCEDURE — 85027 COMPLETE CBC AUTOMATED: CPT | Performed by: STUDENT IN AN ORGANIZED HEALTH CARE EDUCATION/TRAINING PROGRAM

## 2022-07-11 PROCEDURE — 25000003 PHARM REV CODE 250: Performed by: INTERNAL MEDICINE

## 2022-07-11 PROCEDURE — 86580 TB INTRADERMAL TEST: CPT | Performed by: STUDENT IN AN ORGANIZED HEALTH CARE EDUCATION/TRAINING PROGRAM

## 2022-07-11 PROCEDURE — U0002 COVID-19 LAB TEST NON-CDC: HCPCS | Performed by: STUDENT IN AN ORGANIZED HEALTH CARE EDUCATION/TRAINING PROGRAM

## 2022-07-11 PROCEDURE — 97530 THERAPEUTIC ACTIVITIES: CPT

## 2022-07-11 PROCEDURE — 97535 SELF CARE MNGMENT TRAINING: CPT

## 2022-07-11 PROCEDURE — 80048 BASIC METABOLIC PNL TOTAL CA: CPT | Performed by: STUDENT IN AN ORGANIZED HEALTH CARE EDUCATION/TRAINING PROGRAM

## 2022-07-11 PROCEDURE — 83735 ASSAY OF MAGNESIUM: CPT | Performed by: STUDENT IN AN ORGANIZED HEALTH CARE EDUCATION/TRAINING PROGRAM

## 2022-07-11 PROCEDURE — 82550 ASSAY OF CK (CPK): CPT | Performed by: STUDENT IN AN ORGANIZED HEALTH CARE EDUCATION/TRAINING PROGRAM

## 2022-07-11 PROCEDURE — 30200315 PPD INTRADERMAL TEST REV CODE 302: Performed by: STUDENT IN AN ORGANIZED HEALTH CARE EDUCATION/TRAINING PROGRAM

## 2022-07-11 PROCEDURE — 36415 COLL VENOUS BLD VENIPUNCTURE: CPT | Performed by: STUDENT IN AN ORGANIZED HEALTH CARE EDUCATION/TRAINING PROGRAM

## 2022-07-11 RX ADMIN — MELOXICAM 15 MG: 7.5 TABLET ORAL at 10:07

## 2022-07-11 RX ADMIN — TUBERCULIN PURIFIED PROTEIN DERIVATIVE 5 UNITS: 5 INJECTION, SOLUTION INTRADERMAL at 01:07

## 2022-07-11 RX ADMIN — OXCARBAZEPINE 150 MG: 150 TABLET, FILM COATED ORAL at 10:07

## 2022-07-11 RX ADMIN — Medication 5000 UNITS: at 10:07

## 2022-07-11 RX ADMIN — THERA TABS 1 TABLET: TAB at 10:07

## 2022-07-11 NOTE — PT/OT/SLP PROGRESS
Occupational Therapy   Treatment    Name: Amilcar Phillip  MRN: [id number]  Admitting Diagnosis:  Syncope with normal neurologic examination       Recommendations:     Discharge Recommendations: nursing facility, skilled  Discharge Equipment Recommendations:  other (see comments) (TBD)  Barriers to discharge:  None    Assessment:     Amilcar Phillip is a 76 y.o. male with a medical diagnosis of Syncope with normal neurologic examination.  Performance deficits affecting function are weakness, impaired endurance, impaired self care skills, impaired functional mobilty, gait instability, impaired balance, impaired cognition, decreased lower extremity function, decreased safety awareness, impaired fine motor.     Rehab Prognosis:  Fair; patient would benefit from acute skilled OT services to address these deficits and reach maximum level of function.       Plan:     Patient to be seen 5 x/week to address the above listed problems via self-care/home management, therapeutic activities, therapeutic exercises  · Plan of Care Expires: 08/08/22  · Plan of Care Reviewed with: patient    Subjective     Pain/Comfort:  · Pain Rating 1: 0/10  · Pain Rating Post-Intervention 1: 0/10    Objective:     Communicated with: nurse prior to session.  Patient found supine with telemetry, bed alarm upon OT entry to room.    General Precautions: Standard, fall   Orthopedic Precautions:N/A   Braces: N/A  Respiratory Status: Room air     Occupational Performance:     Bed Mobility:    · Patient completed Rolling/Turning to Left with  contact guard assistance  · Patient completed Rolling/Turning to Right with contact guard assistance  · Patient completed Scooting/Bridging with contact guard assistance     Activities of Daily Living:  · Toileting: total assistance brief change, and clean up/hygiene      [facility] 6 Click ADL:      Treatment & Education:  Role of OT, importance of functional mobility, call bell use    Patient left HOB elevated  with all lines intact, call button in reach and bed alarm onEducation:      GOALS:   Multidisciplinary Problems     Occupational Therapy Goals        Problem: Occupational Therapy    Goal Priority Disciplines Outcome Interventions   Occupational Therapy Goal     OT, PT/OT     Description: Goals to be met by: 8/8/2022     Patient will increase functional independence with ADLs by performing:    UE Dressing with Supervision.  LE Dressing with Supervision.  Grooming while standing at sink with Contact Guard Assistance.  Toileting from toilet with Contact Guard Assistance for hygiene and clothing management.   Toilet transfer to toilet with Contact Guard Assistance.                     Time Tracking:     OT Date of Treatment: 07/11/22  OT Start Time: 1059  OT Stop Time: 1114  OT Total Time (min): 15 min    Billable Minutes:Self Care/Home Management 15    OT/AMBER: OT          7/11/2022

## 2022-07-11 NOTE — DISCHARGE SUMMARY
"[facility] Medicine  Discharge Summary      Patient Name: Amilcar Phillip  MRN: [id number]  Patient Class: IP- Inpatient  Admission Date: 7/7/2022  Hospital Length of Stay: 3 days  Discharge Date and Time:  07/11/2022 9:21 AM  Attending Physician: Andrew Og MD   Discharging Provider: Andrew Og MD  Primary Care Provider: Angel Edwards Jr, MD      HPI:   76 year old male with history of HTN, Cognitive decline, and has had 2/3 COVID vaccinations was brought to ED after being found this AM down on the floor of his room in the assisted living center he lives in. He stated that last PM he got up from bed and then found himself on the floor. He "Thinks" he fell, but does not remember exactly what happened. He recollect calling out for help, but "No one came". He laid on the floor all PM. He denied any pain. No CP or SOB. No palpitations. No HA, vision changes or dizziness. No abdominal pain. The patient is very soft spoken. One must put ear close to his head to hear his speech. ED provider stated that when she tried to stand the patient he was "Very unstable on his feet". Discussed with provider: will order PT eval to see if patient requires higher level of care than assisted living.    In ED: labs reviewed: leukocytosis, with mild normocytic anemia; normal electrolytes, renal function and transaminases; normal BNP and troponin; CPK is elevated, D-dimer is elevated, and lactate is normal. CTA: chest: no PE. CT Head no acute pathology. C-spine: no fracture. Pelvis: no fracture.  CXR reviewed: NAPD. EKG reviewed: sinus with old inferior, but no acute segments. Discussed with ED provider: admit for telemetry monitoring for possible syncope, and gentle hydration for rhabdomyolysis.       * No surgery found *      Hospital Course:   7/8:  Patient is significantly weak, PT /OT.  CT PE negative for PE.  Continue IV fluids, CPK trending downwards. No concerns/issues overnight reported " by the patient or the nursing staff.     7/9:  Patient ambulating with therapy and is asking when he can go home.  CPK trending downwards, continue IV fluids.     7/10:  No acute overnight changes    7/11:  Patient accepted and discharged to SNF.    Physical examination on discharge:  Constitutional: No distress.   HENT: NC  Head: Atraumatic.   Cardiovascular: Normal rate, regular rhythm and normal heart sounds.   Pulmonary/Chest: Effort normal. No wheezes.   Abdominal: Soft. Bowel sounds are normal. No distension and no mass. No tenderness  Neurological: Alert.   Skin: Skin is warm and dry.   Psych: Appropriate mood and affect    I have seen the patient on the day of discharge and reviewed the discharge instructions as outlined.         Goals of Care Treatment Preferences:  Code Status: Full Code      Consults:   Consults (From admission, onward)        Status Ordering Provider     Inpatient consult to Registered Dietitian/Nutritionist  Once        Provider:  (Not yet assigned)    Completed YARED GALLEGO     Inpatient consult to Social Work/Case Management  Once        Provider:  (Not yet assigned)    Acknowledged YARED GALLEGO     IP consult to case management  Once        Provider:  (Not yet assigned)    Completed ELLIE BRADEN     Inpatient consult to Hospitalist  Once        Provider:  Ellie Braden MD    Acknowledged HARDEEP MONTES DE OCA          No new Assessment & Plan notes have been filed under this hospital service since the last note was generated.  Service: Hospital Medicine    Final Active Diagnoses:    Diagnosis Date Noted POA    PRINCIPAL PROBLEM:  Syncope with normal neurologic examination [R55] 07/07/2022 Yes    Non-traumatic rhabdomyolysis [M62.82] 07/08/2022 Yes    Physical deconditioning [R53.81] 07/07/2022 Yes      Problems Resolved During this Admission:    Diagnosis Date Noted Date Resolved POA    Traumatic rhabdomyolysis [T79.6XXA] 07/07/2022 07/11/2022 Yes       Discharged  Condition: good    Disposition: Skilled Nursing Facility    Follow Up:   Follow-up Information     Angel Edwards Jr, MD Follow up in 1 week(s).    Specialty: Internal Medicine  Contact information:  [address] [place] [id number] [phone]                       Patient Instructions:      Activity as tolerated       Significant Diagnostic Studies: Labs:   BMP:   Recent Labs   Lab 07/11/22  0507   GLU 82   *   K 3.6      CO2 26   BUN 13   CREATININE 0.6   CALCIUM 9.1   MG 2.0   , CMP   Recent Labs   Lab 07/11/22  0507   *   K 3.6      CO2 26   GLU 82   BUN 13   CREATININE 0.6   CALCIUM 9.1   ANIONGAP 8   ESTGFRAFRICA >60.0   EGFRNONAA >60.0    and CBC   Recent Labs   Lab 07/11/22  0507   WBC 8.46   HGB 14.0   HCT 42.5        Radiology: X-Ray: CXR: X-Ray Chest 1 View (CXR): No results found for this visit on 07/07/22. and X-Ray Chest PA and Lateral (CXR): No results found for this visit on 07/07/22.    Pending Diagnostic Studies:     None         Medications:  Reconciled Home Medications:      Medication List      CONTINUE taking these medications    acetaminophen 650 MG Tbsr  Commonly known as: TYLENOL  Take 1,300 mg by mouth every 8 (eight) hours.     cholecalciferol (vitamin D3) 125 mcg (5,000 unit) capsule  TAKE ONE CAPSULE BY MOUTH DAILY     donepeziL 10 MG tablet  Commonly known as: ARICEPT  Take 1 tablet (10 mg total) by mouth every evening.     magnesium hydroxide 400 mg/5 ml 400 mg/5 mL Susp  Commonly known as: MILK OF MAGNESIA  Take 15 mLs by mouth daily as needed.     multivitamin per tablet  Commonly known as: THERAGRAN  Take 1 tablet by mouth once daily.     OXcarbazepine 150 MG Tab  Commonly known as: TRILEPTAL  TAKE ONE TABLET BY MOUTH EVERY MORNING and TAKE ONE TABLET BY MOUTH EVERY EVENING     risperiDONE 0.25 MG Tab  Commonly known as: RISPERDAL  TAKE ONE TABLET BY MOUTH EVERY EVENING     rosuvastatin 10 MG tablet  Commonly known as: CRESTOR  TAKE ONE  TABLET BY MOUTH EVERY DAY     sertraline 100 MG tablet  Commonly known as: ZOLOFT  Take 0.5 tablets (50 mg total) by mouth every evening.     traMADoL 50 mg tablet  Commonly known as: ULTRAM  Take 1 tablet (50 mg total) by mouth every 4 to 6 hours as needed for Pain.        STOP taking these medications    CENTRUM SILVER MEN ORAL     meloxicam 15 MG tablet  Commonly known as: MOBIC            Indwelling Lines/Drains at time of discharge:   Lines/Drains/Airways     None                 Time spent on the discharge of patient: 35 minutes         Andrew Og MD  Department of Hospital Medicine  [facility]

## 2022-07-11 NOTE — PROGRESS NOTES
Speech Pathology    Visit attempted 1250.  Pt sleeping.   Nursing requested by not be re-alerted for therapy session, as he is now resting but was previously trying to get out of bed (unassisted) all morning.   Clinician notes that MD discharge summary has been entered and pt is going to skilled nursing.

## 2022-07-11 NOTE — DISCHARGE INSTRUCTIONS
[facility]  Facility Transfer Orders        Admit to: snf    Diagnoses:   Active Hospital Problems    Diagnosis  POA    *Syncope with normal neurologic examination [R55]  Yes    Non-traumatic rhabdomyolysis [M62.82]  Yes    Physical deconditioning [R53.81]  Yes      Resolved Hospital Problems    Diagnosis Date Resolved POA    Traumatic rhabdomyolysis [T79.6XXA] 07/11/2022 Yes     Allergies: Review of patient's allergies indicates:  No Known Allergies    Code Status: full    Vitals: Routine       Diet: cardiac diet    Activity: Up in a chair each morning as tolerated, Ambulate with assistance to bathroom, and Activity as tolerated    Nursing Precautions: Aspiration , Fall, and Pressure ulcer prevention    Bed/Surface: Pressure Redistribution    Consults: PT to evaluate and treat- 3 times a week, OT to evaluate and treat- 3 times a week, and ST to evaluate and treat- 1 times a week    Oxygen: room air    Dialysis: Patient is not on dialysis.     Labs:   Pending Diagnostic Studies:       None          Imaging:     Miscellaneous Care:       IV Access:      Medications: Discontinue all previous medication orders, if any. See new list below.  Current Discharge Medication List        CONTINUE these medications which have NOT CHANGED    Details   cholecalciferol, vitamin D3, 125 mcg (5,000 unit) capsule TAKE ONE CAPSULE BY MOUTH DAILY  Qty: 90 capsule, Refills: 1    Comments: This prescription was filled on 4/21/2022. Any refills authorized will be placed on file.      donepeziL (ARICEPT) 10 MG tablet Take 1 tablet (10 mg total) by mouth every evening.  Qty: 90 tablet, Refills: 1    Comments: This prescription was filled on 3/11/2021. Any refills authorized will be placed on file.  Associated Diagnoses: Dementia with behavioral disturbance, unspecified dementia type      multivitamin (THERAGRAN) per tablet Take 1 tablet by mouth once daily.      OXcarbazepine (TRILEPTAL) 150 MG Tab TAKE ONE TABLET BY MOUTH  EVERY MORNING and TAKE ONE TABLET BY MOUTH EVERY EVENING  Qty: 180 tablet, Refills: 1    Comments: This prescription was filled on 2/1/2022. Any refills authorized will be placed on file.  Associated Diagnoses: Dementia with behavioral disturbance, unspecified dementia type      risperiDONE (RISPERDAL) 0.25 MG Tab TAKE ONE TABLET BY MOUTH EVERY EVENING  Qty: 90 tablet, Refills: 1    Comments: This prescription was filled on 1/31/2022. Any refills authorized will be placed on file.  Associated Diagnoses: Dementia with behavioral disturbance, unspecified dementia type      rosuvastatin (CRESTOR) 10 MG tablet TAKE ONE TABLET BY MOUTH EVERY DAY  Qty: 90 tablet, Refills: 1    Comments: This prescription was filled on 1/31/2022. Any refills authorized will be placed on file.  Associated Diagnoses: Hypercholesteremia      sertraline (ZOLOFT) 100 MG tablet Take 0.5 tablets (50 mg total) by mouth every evening.  Qty: 90 tablet, Refills: 1    Associated Diagnoses: Dementia with behavioral disturbance, unspecified dementia type      acetaminophen (TYLENOL) 650 MG TbSR Take 1,300 mg by mouth every 8 (eight) hours.      magnesium hydroxide 400 mg/5 ml (MILK OF MAGNESIA) 400 mg/5 mL Susp Take 15 mLs by mouth daily as needed.      traMADoL (ULTRAM) 50 mg tablet Take 1 tablet (50 mg total) by mouth every 4 to 6 hours as needed for Pain.  Qty: 30 tablet, Refills: 0           STOP taking these medications       meloxicam (MOBIC) 15 MG tablet Comments:   Reason for Stopping:         multivit-min/FA/lycopen/lutein (CENTRUM SILVER MEN ORAL) Comments:   Reason for Stopping:             Follow up:    Follow-up Information       Angel Edwards Jr, MD Follow up in 1 week(s).    Specialty: Internal Medicine  Contact information:  [address] [place] [id number] [phone]                               Immunizations Administered as of 7/11/2022       Name Date Dose VIS Date Route Exp Date    COVID-19, MRNA, LN-S, PF (Moderna)  11/15/2021 0.25 mL -- -- --    Lot: [id number]     COVID-19, MRNA, LN-S, PF (Moderna) 2/11/2021 -- -- Intramuscular --    Site: Left deltoid     Lot: [id number]     COVID-19, MRNA, LN-S, PF (Moderna) 1/14/2021 -- -- Intramuscular --    Site: Left deltoid     Lot: [id number]             This patient has had both covid vaccinations    Some patients may experience side effects after vaccination.  These may include fever, headache, muscle or joint aches.  Most symptoms resolve with 24-48 hours and do not require urgent medical evaluation unless they persist for more than 72 hours or symptoms are concerning for an unrelated medical condition.          Andrew Og MD

## 2022-07-11 NOTE — PT/OT/SLP PROGRESS
"Physical Therapy Treatment    Patient Name:  Amilcar Phillip   MRN:  [id number]    Recommendations:     Discharge Recommendations:  nursing facility, skilled   Discharge Equipment Recommendations: none   Barriers to discharge: increased assist with mobility    Assessment:     Amilcar Phillip is a 76 y.o. male admitted with a medical diagnosis of Syncope with normal neurologic examination.  He presents with the following impairments/functional limitations:  weakness, impaired endurance, impaired self care skills, impaired functional mobilty, gait instability, impaired balance, impaired cognition, decreased lower extremity function, decreased safety awareness.    Pt presents with HOB elevated, agreeable to PT visit. Pt required min A for bed mobility and moved very slowly. Pt stood with RW and Real with posterior lean, unable to correct with cues. Pt asking "Why won't they let me go?" Pt oriented to self only. Returned to supine after treatment with all needs in reach.    Rehab Prognosis: Fair; patient would benefit from acute skilled PT services to address these deficits and reach maximum level of function.    Recent Surgery: * No surgery found *      Plan:     During this hospitalization, patient to be seen 6 x/week to address the identified rehab impairments via gait training, therapeutic activities, therapeutic exercises and progress toward the following goals:    · Plan of Care Expires:  08/08/22    Subjective     Chief Complaint: Confused about where he is and why  Patient/Family Comments/goals: to get out of hospital  Pain/Comfort:  · Pain Rating 1: 0/10      Objective:     Communicated with RN prior to session.  Patient found HOB elevated with telemetry, bed alarm upon PT entry to room.     General Precautions: Standard, fall   Orthopedic Precautions:N/A   Braces: N/A  Respiratory Status: Room air     Functional Mobility:  · Bed Mobility:     · Supine to Sit: minimum assistance  · Sit to Supine: " minimum assistance  · Transfers:     · Sit to Stand:  minimum assistance with rolling walker  · Gait: 4 side steps EOB with RW and mod A for posterior lean    Therapeutic Activities and Exercises:   Education, poc, dc planning, fall prevention.    Pt performed seated therapeutic exercise for B LEs x 10 each: LAQ, marching, ankle pumps.    Patient left HOB elevated with all lines intact, call button in reach and bed alarm on..    GOALS:   Multidisciplinary Problems     Physical Therapy Goals        Problem: Physical Therapy    Goal Priority Disciplines Outcome Goal Variances Interventions   Physical Therapy Goal     PT, PT/OT Ongoing, Progressing     Description: Goals to be met by: 2022     Patient will increase functional independence with mobility by performin. Supine to sit with Stand-by Assistance  2. Sit to supine with Stand-by Assistance  3. Sit to stand transfer with Stand-by Assistance  4. Gait  x 100 feet with Contact Guard Assistance using Rolling Walker.                      Time Tracking:     PT Received On: 22  PT Start Time: 0959     PT Stop Time: 1014  PT Total Time (min): 15 min     Billable Minutes: Therapeutic Activity 15    Treatment Type: Treatment  PT/PTA: PT     PTA Visit Number: 0     2022

## 2022-07-11 NOTE — HOSPITAL COURSE
"Andrey Estarda MD   76 year old male with history of HTN, Cognitive decline, and has had 2/3 COVID vaccinations was brought to ED after being found this AM down on the floor of his room in the assisted living center he lives in. He stated that last PM he got up from bed and then found himself on the floor. He "Thinks" he fell, but does not remember exactly what happened. He recollect calling out for help, but "No one came". He laid on the floor all PM. He denied any pain. No CP or SOB. No palpitations. No HA, vision changes or dizziness. No abdominal pain. The patient is very soft spoken. One must put ear close to his head to hear his speech. ED provider stated that when she tried to stand the patient he was "Very unstable on his feet". Discussed with provider: will order PT eval to see if patient requires higher level of care than assisted living.     In ED: labs reviewed: leukocytosis, with mild normocytic anemia; normal electrolytes, renal function and transaminases; normal BNP and troponin; CPK is elevated, D-dimer is elevated, and lactate is normal. CTA: chest: no PE. CT Head no acute pathology. C-spine: no fracture. Pelvis: no fracture.  CXR reviewed: NAPD. EKG reviewed: sinus with old inferior, but no acute segments. Discussed with ED provider: admit for telemetry monitoring for possible syncope, and gentle hydration for rhabdomyolysis.      Interval History:   7/8:  Patient is significantly weak, PT /OT.  CT PE negative for PE.  Continue IV fluids, CPK trending downwards. No concerns/issues overnight reported by the patient or the nursing staff.     7/9:  Patient ambulating with therapy and is asking when he can go home.  CPK trending downwards, continue IV fluids.     7/10:  No acute overnight changes  "

## 2022-07-12 PROBLEM — Z71.89 ADVANCE CARE PLANNING: Status: ACTIVE | Noted: 2022-07-12

## 2022-07-12 PROBLEM — Z75.8 DISCHARGE PLANNING ISSUES: Status: ACTIVE | Noted: 2022-07-12

## 2022-07-12 PROBLEM — Z02.9 DISCHARGE PLANNING ISSUES: Status: ACTIVE | Noted: 2022-07-12

## 2022-07-12 LAB — BACTERIA BLD CULT: NORMAL

## 2022-07-13 LAB — BACTERIA BLD CULT: NORMAL

## 2022-07-13 NOTE — PT/OT/SLP DISCHARGE
Occupational Therapy Discharge Summary    Amilcar Phillip  MRN: [id number]   Principal Problem: Syncope with normal neurologic examination      Patient Discharged from acute Occupational Therapy on 7/11/2022.  Please refer to prior OT note dated 7/11/2022 for functional status.    Assessment:      Patient has not met goals.    Objective:     GOALS:   Multidisciplinary Problems     Occupational Therapy Goals     Not on file          Multidisciplinary Problems (Resolved)        Problem: Occupational Therapy    Goal Priority Disciplines Outcome Interventions   Occupational Therapy Goal   (Resolved)     OT, PT/OT Met    Description: Goals to be met by: 8/8/2022     Patient will increase functional independence with ADLs by performing:    UE Dressing with Supervision.  LE Dressing with Supervision.  Grooming while standing at sink with Contact Guard Assistance.  Toileting from toilet with Contact Guard Assistance for hygiene and clothing management.   Toilet transfer to toilet with Contact Guard Assistance.                     Reasons for Discontinuation of Therapy Services  Transfer to alternate level of care.      Plan:     Patient Discharged to: Skilled Nursing Facility    7/13/2022   IMMUNIZATIONS UTD

## 2022-07-14 PROBLEM — F03.90 DEMENTIA WITHOUT BEHAVIORAL DISTURBANCE: Status: ACTIVE | Noted: 2021-12-23

## 2022-07-15 PROBLEM — F03.918 DEMENTIA WITH BEHAVIORAL DISTURBANCE: Status: ACTIVE | Noted: 2021-12-23

## 2022-07-15 PROBLEM — F05 SUNDOWNING: Status: ACTIVE | Noted: 2022-07-15

## 2022-07-25 ENCOUNTER — PATIENT OUTREACH (OUTPATIENT)
Dept: ADMINISTRATIVE | Facility: CLINIC | Age: 77
End: 2022-07-25
Payer: MEDICARE

## 2022-07-28 ENCOUNTER — TELEPHONE (OUTPATIENT)
Dept: PULMONOLOGY | Facility: CLINIC | Age: 77
End: 2022-07-28

## 2022-07-28 ENCOUNTER — TELEPHONE (OUTPATIENT)
Dept: FAMILY MEDICINE | Facility: CLINIC | Age: 77
End: 2022-07-28

## 2022-07-28 NOTE — TELEPHONE ENCOUNTER
----- Message from Angel Edwards Jr., MD sent at 7/27/2022  9:44 PM CDT -----  I'm not sure there is really anything that would be safe and not interact with other meds.    ----- Message -----  From: Kaycee Walsh MA  Sent: 7/27/2022   1:19 PM CDT  To: Angel Edwards Jr., MD      ----- Message -----  From: Sangeeta Ramírez  Sent: 7/27/2022   1:03 PM CDT  To: Angel Edwards Staff    Former[facility]

## 2022-07-28 NOTE — TELEPHONE ENCOUNTER
----- Message from Valerie Leblanc sent at 7/28/2022 10:12 AM CDT -----  Regarding: discuss meds  Patient recently became a resident at [facility]. Fe called regarding two medications that  the patient have not been able to take due to needing instructions on those particular medications. Fe would like a call back as soon as possible so patient can get back on regimen.  [phone]

## 2022-08-10 ENCOUNTER — OFFICE VISIT (OUTPATIENT)
Dept: ORTHOPEDICS | Facility: CLINIC | Age: 77
End: 2022-08-10
Payer: MEDICARE

## 2022-08-10 VITALS — BODY MASS INDEX: 23.39 KG/M2 | WEIGHT: 149 LBS | HEIGHT: 67 IN

## 2022-08-10 DIAGNOSIS — M17.11 PRIMARY OSTEOARTHRITIS OF RIGHT KNEE: ICD-10-CM

## 2022-08-10 DIAGNOSIS — M17.12 PRIMARY OSTEOARTHRITIS OF LEFT KNEE: Primary | ICD-10-CM

## 2022-08-10 PROCEDURE — 99213 OFFICE O/P EST LOW 20 MIN: CPT | Mod: 25,S$GLB,, | Performed by: PHYSICIAN ASSISTANT

## 2022-08-10 PROCEDURE — 20610 LARGE JOINT ASPIRATION/INJECTION: L KNEE: ICD-10-PCS | Mod: LT,S$GLB,, | Performed by: PHYSICIAN ASSISTANT

## 2022-08-10 PROCEDURE — 20610 DRAIN/INJ JOINT/BURSA W/O US: CPT | Mod: LT,S$GLB,, | Performed by: PHYSICIAN ASSISTANT

## 2022-08-10 PROCEDURE — 99213 PR OFFICE/OUTPT VISIT, EST, LEVL III, 20-29 MIN: ICD-10-PCS | Mod: 25,S$GLB,, | Performed by: PHYSICIAN ASSISTANT

## 2022-08-10 RX ORDER — TRIAMCINOLONE ACETONIDE 40 MG/ML
40 INJECTION, SUSPENSION INTRA-ARTICULAR; INTRAMUSCULAR
Status: DISCONTINUED | OUTPATIENT
Start: 2022-08-10 | End: 2022-08-10 | Stop reason: HOSPADM

## 2022-08-10 RX ADMIN — TRIAMCINOLONE ACETONIDE 40 MG: 40 INJECTION, SUSPENSION INTRA-ARTICULAR; INTRAMUSCULAR at 01:08

## 2022-08-10 NOTE — PROGRESS NOTES
[facility]ED[facility]  [address]  [place] [address]  Phone: [phone]   [phone]    Patient's PCP: Angel Edwards Jr, MD  Referring Provider: No ref. provider found    Subjective:      Chief Complaint:   Chief Complaint   Patient presents with    Right Knee - Pain     Right knee injection 06/09/22, patient didn't get much relief from the injections.    Left Knee - Pain     Patient states that the left knee is bothering him more than the right knee.       Past Medical History:   Diagnosis Date    Hypercholesteremia     Hypertension     Vitamin D deficiency        Past Surgical History:   Procedure Laterality Date    COLONOSCOPY      HERNIA REPAIR         Current Outpatient Medications   Medication Sig    acetaminophen (TYLENOL) 325 MG tablet Take 2 tablets (650 mg total) by mouth every 6 (six) hours as needed for Pain.    cholecalciferol, vitamin D3, 125 mcg (5,000 unit) capsule TAKE ONE CAPSULE BY MOUTH DAILY    donepeziL (ARICEPT) 10 MG tablet Take 1 tablet (10 mg total) by mouth every evening.    multivitamin (THERAGRAN) per tablet Take 1 tablet by mouth once daily.    OXcarbazepine (TRILEPTAL) 150 MG Tab TAKE ONE TABLET BY MOUTH EVERY MORNING and TAKE ONE TABLET BY MOUTH EVERY EVENING    risperiDONE (RISPERDAL) 0.25 MG Tab TAKE ONE TABLET BY MOUTH EVERY EVENING    rosuvastatin (CRESTOR) 10 MG tablet TAKE ONE TABLET BY MOUTH EVERY DAY    sertraline (ZOLOFT) 100 MG tablet Take 0.5 tablets (50 mg total) by mouth every evening.    traZODone (DESYREL) 50 MG tablet Take 0.5 tablets (25 mg total) by mouth every evening.     No current facility-administered medications for this visit.       Review of patient's allergies indicates:  No Known Allergies    Family History   Problem Relation Age of Onset    Osteoporosis Mother     Vaginal cancer Mother     Stroke Father        Social History     Socioeconomic History    Marital status:    Occupational History     Occupation: retired   Tobacco Use    Smoking status: Former Smoker     Types: Cigarettes, Cigars     Quit date:      Years since quittin.6    Smokeless tobacco: Never Used   Substance and Sexual Activity    Alcohol use: Yes    Drug use: Never    Sexual activity: Not Currently   Social History Narrative    Live in assistive living center       Prior to meeting with the patient I reviewed the medical chart in [facility]. This included reviewing the previous progress notes from our office, review of the patient's last appointment with their primary care provider, review of any visits to the emergency room, and review of any pain management appointments or procedures.    History of present illness: Mr. Fitzgerald comes in today for follow-up for his bilateral knee osteoarthritis.  He was last seen in  and given a steroid injection in the right knee.  Previously he had viscosupplementation injection in his right knee with decent relief.  Unfortunately, the pain is beginning to return primarily after prolonged periods of rest.  He has had a cortisone injection, approximately 2 months ago in the right knee but no treatment in the left knee.      Review of Systems:    Constitutional: Negative for chills, fever and weight loss.   HENT: Negative for congestion.    Eyes: Negative for discharge and redness.   Respiratory: Negative for cough and shortness of breath.    Cardiovascular: Negative for chest pain.   Gastrointestinal: Negative for nausea and vomiting.   Musculoskeletal: See HPI.   Skin: Negative for rash.   Neurological: Negative for headaches.   Endo/Heme/Allergies: Does not bruise/bleed easily.   Psychiatric/Behavioral: The patient is not nervous/anxious.    All other systems reviewed and are negative.       Objective:      Physical Examination:    Vital Signs:  There were no vitals filed for this visit.    Body mass index is 23.34 kg/m².    This a well-developed, well nourished patient in no acute  distress.  They are alert and oriented and cooperative to examination.     Bilateral knee exam:  Skin to his bilateral knee is clean dry and intact.  There is no erythema or ecchymosis.  There are no signs or symptoms of infection.  Patient is neurovascular intact throughout the bilateral lower extremity.   bilateral calf is soft and nontender.  Patient has no effusion to the bilateral knee.   bilateral knee range of motion approximately 5-110 degrees.      Pertinent New Results:        XRAY Report / Interpretation:   AP lateral sunrise views of bilateral knees taken today in the office demonstrate some medial compartment collapse.  He still maintains a sufficient amount of joint space per the x-ray.  The patellofemoral joints show some arthritic changes.          Assessment:       1. Primary osteoarthritis of left knee    2. Primary osteoarthritis of right knee      Plan:     Primary osteoarthritis of left knee  -     X-Ray Knee 3 View Bilateral  -     Large Joint Aspiration/Injection: L knee    Primary osteoarthritis of right knee  -     X-Ray Knee 3 View Bilateral        Follow up in about 3 months (around 11/10/2022) for 3 mth f/up Left knee injection 8/10/22.    Osteoarthritis bilateral knees.  Patient is a 76-year-old gentleman who lives in assisted living, he has dementia.  Do not think he is a good candidate for arthroscopy or surgery.  Will continue intra-articular steroid injections.  Follow-up in 3 months.    [unfilled]  DEMETRIUS Will PA-C    This note was created using [employer] voice recognition software that occasionally misinterprets words or phrases.

## 2022-08-10 NOTE — PROCEDURES
Large Joint Aspiration/Injection: L knee    Date/Time: 8/10/2022 1:30 PM  Performed by: SUJEY Garcia  Authorized by: SUJEY Garcia     Consent Done?:  Yes (Verbal)  Indications:  Arthritis and pain  Site marked: the procedure site was marked    Timeout: prior to procedure the correct patient, procedure, and site was verified    Prep: patient was prepped and draped in usual sterile fashion      Local anesthesia used?: Yes    Local anesthetic:  Lidocaine 1% without epinephrine    Details:  Needle Size:  25 G  Ultrasonic Guidance for needle placement?: No    Location:  Knee  Site:  L knee  Medications:  40 mg triamcinolone acetonide 40 mg/mL  Patient tolerance:  Patient tolerated the procedure well with no immediate complications

## 2022-08-25 ENCOUNTER — TELEPHONE (OUTPATIENT)
Dept: FAMILY MEDICINE | Facility: CLINIC | Age: 77
End: 2022-08-25

## 2022-08-25 RX ORDER — ROPINIROLE 0.5 MG/1
0.5 TABLET, FILM COATED ORAL NIGHTLY
Qty: 30 TABLET | Refills: 3 | Status: SHIPPED | OUTPATIENT
Start: 2022-08-25 | End: 2022-09-29

## 2022-08-25 NOTE — TELEPHONE ENCOUNTER
----- Message from Tomer Sampson MA sent at 8/25/2022 11:08 AM CDT -----    ----- Message -----  From: Brissa Dior MA  Sent: 8/25/2022  10:49 AM CDT  To: Angel Jones

## 2022-09-08 RX ORDER — TRAZODONE HYDROCHLORIDE 50 MG/1
25 TABLET ORAL NIGHTLY
Qty: 15 TABLET | Refills: 1 | Status: SHIPPED | OUTPATIENT
Start: 2022-09-08 | End: 2022-09-29 | Stop reason: HOSPADM

## 2022-09-29 ENCOUNTER — OFFICE VISIT (OUTPATIENT)
Dept: FAMILY MEDICINE | Facility: CLINIC | Age: 77
End: 2022-09-29
Payer: MEDICARE

## 2022-09-29 VITALS
BODY MASS INDEX: 24.64 KG/M2 | WEIGHT: 157 LBS | SYSTOLIC BLOOD PRESSURE: 92 MMHG | HEIGHT: 67 IN | OXYGEN SATURATION: 98 % | HEART RATE: 70 BPM | TEMPERATURE: 97 F | DIASTOLIC BLOOD PRESSURE: 64 MMHG

## 2022-09-29 DIAGNOSIS — I10 PRIMARY HYPERTENSION: ICD-10-CM

## 2022-09-29 DIAGNOSIS — Z23 NEED FOR PROPHYLACTIC VACCINATION AND INOCULATION AGAINST INFLUENZA: ICD-10-CM

## 2022-09-29 DIAGNOSIS — F03.91 DEMENTIA WITH BEHAVIORAL DISTURBANCE, UNSPECIFIED DEMENTIA TYPE: Primary | ICD-10-CM

## 2022-09-29 PROCEDURE — 99213 OFFICE O/P EST LOW 20 MIN: CPT | Mod: AQ,S$GLB,, | Performed by: INTERNAL MEDICINE

## 2022-09-29 PROCEDURE — 90662 IIV NO PRSV INCREASED AG IM: CPT | Mod: S$GLB,,, | Performed by: INTERNAL MEDICINE

## 2022-09-29 PROCEDURE — 99213 PR OFFICE/OUTPT VISIT, EST, LEVL III, 20-29 MIN: ICD-10-PCS | Mod: AQ,S$GLB,, | Performed by: INTERNAL MEDICINE

## 2022-09-29 PROCEDURE — G0008 FLU VACCINE - QUADRIVALENT - HIGH DOSE (65+) PRESERVATIVE FREE IM: ICD-10-PCS | Mod: S$GLB,,, | Performed by: INTERNAL MEDICINE

## 2022-09-29 PROCEDURE — 90662 FLU VACCINE - QUADRIVALENT - HIGH DOSE (65+) PRESERVATIVE FREE IM: ICD-10-PCS | Mod: S$GLB,,, | Performed by: INTERNAL MEDICINE

## 2022-09-29 PROCEDURE — G0008 ADMIN INFLUENZA VIRUS VAC: HCPCS | Mod: S$GLB,,, | Performed by: INTERNAL MEDICINE

## 2022-09-29 RX ORDER — ROPINIROLE 0.5 MG/1
0.5 TABLET, FILM COATED ORAL NIGHTLY
Qty: 30 TABLET | Refills: 3
Start: 2022-09-29 | End: 2022-12-29

## 2022-09-29 RX ORDER — VALSARTAN 40 MG/1
40 TABLET ORAL DAILY
COMMUNITY
End: 2023-03-29

## 2022-09-29 RX ORDER — MELOXICAM 15 MG/1
15 TABLET ORAL DAILY
COMMUNITY

## 2022-09-29 NOTE — PROGRESS NOTES
Subjective:       Patient ID: Amilcar Phillip is a 77 y.o. male.    Chief Complaint: Hyperlipidemia (6 months follow up) and cognitive decline    Here for follow up.  No further reports of significant behavioral issues but cognition does continue to decline.  Asking about weaning meds since behavior hasn't been much of an issue.  He lives in an assisted living facility; he has a sitter 8 hours per day and they also check on him in middle of night.  In reviewing med list, they are giving him his trazodone at 6 PM but he typically does not get in bed until 10 PM.   Sleeps in recliner before that.     Hypertension  This is a chronic problem. The problem is controlled. Pertinent negatives include no anxiety, chest pain, headaches, malaise/fatigue, neck pain, palpitations, peripheral edema or shortness of breath. (BP on low side but doesn't appear to have lightheadedness.) Past treatments include angiotensin blockers. There are no compliance problems.    Review of Systems   Constitutional:  Positive for activity change and fatigue. Negative for appetite change, chills, diaphoresis, fever, malaise/fatigue and unexpected weight change.   HENT:  Negative for congestion, ear discharge, ear pain, hearing loss, nosebleeds, postnasal drip, rhinorrhea, sinus pressure, sinus pain, sneezing, sore throat, tinnitus, trouble swallowing and voice change.         Drooling   Eyes:  Negative for photophobia, pain, discharge, redness, itching and visual disturbance.   Respiratory:  Negative for apnea, cough, choking, chest tightness, shortness of breath and wheezing.    Cardiovascular:  Negative for chest pain, palpitations and leg swelling.   Gastrointestinal:  Positive for constipation. Negative for abdominal distention, abdominal pain, blood in stool, diarrhea, nausea and vomiting.   Endocrine: Negative for cold intolerance, heat intolerance, polydipsia and polyuria.   Genitourinary:  Negative for decreased urine volume,  difficulty urinating, dysuria, enuresis, flank pain, frequency, genital sores, hematuria, penile discharge, penile pain, scrotal swelling, testicular pain and urgency.   Musculoskeletal:  Positive for arthralgias, back pain and neck stiffness. Negative for gait problem, joint swelling, myalgias and neck pain.   Skin:  Negative for rash and wound.   Allergic/Immunologic: Negative for environmental allergies, food allergies and immunocompromised state.   Neurological:  Positive for weakness. Negative for dizziness, tremors, seizures, syncope, facial asymmetry, speech difficulty, light-headedness, numbness and headaches.   Hematological:  Negative for adenopathy. Does not bruise/bleed easily.   Psychiatric/Behavioral:  Positive for confusion and sleep disturbance (restless legs). Negative for decreased concentration, dysphoric mood, hallucinations, self-injury and suicidal ideas. The patient is not nervous/anxious.      Past Medical History:   Diagnosis Date    Hypercholesteremia     Hypertension     Vitamin D deficiency       Past Surgical History:   Procedure Laterality Date    COLONOSCOPY      HERNIA REPAIR         Family History   Problem Relation Age of Onset    Osteoporosis Mother     Vaginal cancer Mother     Stroke Father        Social History     Socioeconomic History    Marital status:    Occupational History    Occupation: retired   Tobacco Use    Smoking status: Former     Types: Cigarettes, Cigars     Quit date:      Years since quittin.7    Smokeless tobacco: Never   Substance and Sexual Activity    Alcohol use: Yes    Drug use: Never    Sexual activity: Not Currently   Social History Narrative    Live in assistive living center       Current Outpatient Medications   Medication Sig Dispense Refill    acetaminophen (TYLENOL) 325 MG tablet Take 2 tablets (650 mg total) by mouth every 6 (six) hours as needed for Pain.  0    cholecalciferol, vitamin D3, 125 mcg (5,000 unit) capsule TAKE ONE  "CAPSULE BY MOUTH DAILY 90 capsule 1    donepeziL (ARICEPT) 10 MG tablet TAKE ONE TABLET BY MOUTH EVERY EVENING 90 tablet 1    meloxicam (MOBIC) 15 MG tablet Take 15 mg by mouth once daily.      multivitamin (THERAGRAN) per tablet Take 1 tablet by mouth once daily.      OXcarbazepine (TRILEPTAL) 150 MG Tab TAKE ONE TABLET BY MOUTH EVERY MORNING and TAKE ONE TABLET BY MOUTH EVERY EVENING 180 tablet 1    risperiDONE (RISPERDAL) 0.25 MG Tab TAKE ONE TABLET BY MOUTH EVERY EVENING 90 tablet 1    rosuvastatin (CRESTOR) 10 MG tablet TAKE ONE TABLET BY MOUTH EVERY DAY 90 tablet 1    sertraline (ZOLOFT) 100 MG tablet TAKE 1/2 TABLET BY MOUTH EVERY EVENING 90 tablet 1    valsartan (DIOVAN) 40 MG tablet Take 40 mg by mouth once daily.      rOPINIRole (REQUIP) 0.5 MG tablet Take 1 tablet (0.5 mg total) by mouth every evening. 30 tablet 3     No current facility-administered medications for this visit.       Review of patient's allergies indicates:  No Known Allergies  Objective:    HPI     Hyperlipidemia     Additional comments: 6 months follow up          Last edited by Tomer Sampson MA on 9/29/2022 11:23 AM.      Blood pressure 92/64, pulse 70, temperature 96.5 °F (35.8 °C), temperature source Temporal, height 5' 7" (1.702 m), weight 71.2 kg (157 lb), SpO2 98 %. Body mass index is 24.59 kg/m².   Physical Exam  Vitals and nursing note reviewed.   Constitutional:       General: He is not in acute distress.     Appearance: He is well-developed. He is not ill-appearing, toxic-appearing or diaphoretic.   HENT:      Nose: Nose normal.   Eyes:      General: No scleral icterus.        Right eye: No discharge.         Left eye: No discharge.      Conjunctiva/sclera: Conjunctivae normal.   Neck:      Vascular: No carotid bruit.   Cardiovascular:      Rate and Rhythm: Normal rate and regular rhythm.      Heart sounds: Normal heart sounds. No murmur heard.  Pulmonary:      Effort: Pulmonary effort is normal. No respiratory distress.     "  Breath sounds: Normal breath sounds. No decreased breath sounds, wheezing, rhonchi or rales.   Abdominal:      General: There is no distension.      Palpations: Abdomen is soft.      Tenderness: There is no abdominal tenderness. There is no guarding or rebound.   Genitourinary:     Rectum: Guaiac result negative. No anal fissure or external hemorrhoid.   Musculoskeletal:      Thoracic back: Deformity (kyphosis) present.      Comments: Crepitus knees   Skin:     General: Skin is warm and dry.   Neurological:      Mental Status: He is alert.      Cranial Nerves: No facial asymmetry.      Motor: No tremor.      Gait: Gait abnormal (ambulation with walker).   Psychiatric:         Speech: Speech normal.         Behavior: Behavior normal.         Cognition and Memory: Memory is impaired.           Assessment:       1. Dementia with behavioral disturbance, unspecified dementia type    2. Primary hypertension    3. Need for prophylactic vaccination and inoculation against influenza        Plan:       Amilcar was seen today for hyperlipidemia and cognitive decline.    Diagnoses and all orders for this visit:    Dementia with behavioral disturbance, unspecified dementia type  Comments:  Discussed adding namenda; defer for now.  Also discussed weaning risperdal; check with assisted living facilty on their policy if behavior worsens. Stop trazodo    Primary hypertension  Comments:  Monitor for s/s low BP    Need for prophylactic vaccination and inoculation against influenza  -     Influenza - Quadrivalent - High Dose (65+) (PF) (IM)    Other orders  -     rOPINIRole (REQUIP) 0.5 MG tablet; Take 1 tablet (0.5 mg total) by mouth every evening.

## 2022-11-09 ENCOUNTER — OFFICE VISIT (OUTPATIENT)
Dept: ORTHOPEDICS | Facility: CLINIC | Age: 77
End: 2022-11-09
Payer: MEDICARE

## 2022-11-09 VITALS
HEIGHT: 67 IN | BODY MASS INDEX: 24.64 KG/M2 | SYSTOLIC BLOOD PRESSURE: 112 MMHG | WEIGHT: 157 LBS | DIASTOLIC BLOOD PRESSURE: 78 MMHG

## 2022-11-09 DIAGNOSIS — M17.12 PRIMARY OSTEOARTHRITIS OF LEFT KNEE: ICD-10-CM

## 2022-11-09 DIAGNOSIS — M17.11 PRIMARY OSTEOARTHRITIS OF RIGHT KNEE: Primary | ICD-10-CM

## 2022-11-09 PROCEDURE — 20610 DRAIN/INJ JOINT/BURSA W/O US: CPT | Mod: 50,S$GLB,, | Performed by: PHYSICIAN ASSISTANT

## 2022-11-09 PROCEDURE — 99213 PR OFFICE/OUTPT VISIT, EST, LEVL III, 20-29 MIN: ICD-10-PCS | Mod: 25,S$GLB,, | Performed by: PHYSICIAN ASSISTANT

## 2022-11-09 PROCEDURE — 20610 LARGE JOINT ASPIRATION/INJECTION: R KNEE: ICD-10-PCS | Mod: 50,S$GLB,, | Performed by: PHYSICIAN ASSISTANT

## 2022-11-09 PROCEDURE — 99213 OFFICE O/P EST LOW 20 MIN: CPT | Mod: 25,S$GLB,, | Performed by: PHYSICIAN ASSISTANT

## 2022-11-09 RX ORDER — TRIAMCINOLONE ACETONIDE 40 MG/ML
40 INJECTION, SUSPENSION INTRA-ARTICULAR; INTRAMUSCULAR
Status: DISCONTINUED | OUTPATIENT
Start: 2022-11-09 | End: 2022-11-09 | Stop reason: HOSPADM

## 2022-11-09 RX ADMIN — TRIAMCINOLONE ACETONIDE 40 MG: 40 INJECTION, SUSPENSION INTRA-ARTICULAR; INTRAMUSCULAR at 01:11

## 2022-11-09 NOTE — PROGRESS NOTES
[facility]ED[facility]  [address]  [place] [address]  Phone: [phone]   [phone]    Patient's PCP: Angel Edwards Jr, MD  Referring Provider: No ref. provider found    Subjective:      Chief Complaint:   Chief Complaint   Patient presents with    Left Knee - Pain     patient is here for his three month injection in his knees.    Right Knee - Pain       Past Medical History:   Diagnosis Date    Hypercholesteremia     Hypertension     Vitamin D deficiency        Past Surgical History:   Procedure Laterality Date    COLONOSCOPY      HERNIA REPAIR         Current Outpatient Medications   Medication Sig    acetaminophen (TYLENOL) 325 MG tablet Take 2 tablets (650 mg total) by mouth every 6 (six) hours as needed for Pain.    cholecalciferol, vitamin D3, 125 mcg (5,000 unit) capsule TAKE ONE CAPSULE BY MOUTH DAILY    donepeziL (ARICEPT) 10 MG tablet TAKE ONE TABLET BY MOUTH EVERY EVENING    meloxicam (MOBIC) 15 MG tablet Take 15 mg by mouth once daily.    multivitamin (THERAGRAN) per tablet Take 1 tablet by mouth once daily.    OXcarbazepine (TRILEPTAL) 150 MG Tab TAKE ONE TABLET BY MOUTH EVERY MORNING and TAKE ONE TABLET BY MOUTH EVERY EVENING    rOPINIRole (REQUIP) 0.5 MG tablet Take 1 tablet (0.5 mg total) by mouth every evening.    rosuvastatin (CRESTOR) 10 MG tablet TAKE ONE TABLET BY MOUTH EVERY DAY    sertraline (ZOLOFT) 100 MG tablet TAKE 1/2 TABLET BY MOUTH EVERY EVENING    valsartan (DIOVAN) 40 MG tablet Take 40 mg by mouth once daily.    risperiDONE (RISPERDAL) 0.25 MG Tab TAKE ONE TABLET BY MOUTH EVERY EVENING (Patient not taking: Reported on 11/9/2022)     No current facility-administered medications for this visit.       Review of patient's allergies indicates:  No Known Allergies    Family History   Problem Relation Age of Onset    Osteoporosis Mother     Vaginal cancer Mother     Stroke Father        Social History     Socioeconomic History    Marital status:     Occupational History    Occupation: retired   Tobacco Use    Smoking status: Former     Types: Cigarettes, Cigars     Quit date:      Years since quittin.8    Smokeless tobacco: Never   Substance and Sexual Activity    Alcohol use: Yes    Drug use: Never    Sexual activity: Not Currently   Social History Narrative    Live in assistive living center       Prior to meeting with the patient I reviewed the medical chart in [facility]. This included reviewing the previous progress notes from our office, review of the patient's last appointment with their primary care provider, review of any visits to the emergency room, and review of any pain management appointments or procedures.    History of present illness:  77-year-old male who presents to clinic today to follow-up on his bilateral knees.  He has known osteoarthritis in bilateral knees.  He has had viscosupplementation and steroid injections.  Last seen and evaluated in the office in August approximately 3 months ago.  His knees have started to bother him again.  He is here for repeat steroid injections.      Review of Systems:    Constitutional: Negative for chills, fever and weight loss.   HENT: Negative for congestion.    Eyes: Negative for discharge and redness.   Respiratory: Negative for cough and shortness of breath.    Cardiovascular: Negative for chest pain.   Gastrointestinal: Negative for nausea and vomiting.   Musculoskeletal: See HPI.   Skin: Negative for rash.   Neurological: Negative for headaches.   Endo/Heme/Allergies: Does not bruise/bleed easily.   Psychiatric/Behavioral: The patient is not nervous/anxious.    All other systems reviewed and are negative.       Objective:      Physical Examination:    Vital Signs:    Vitals:    22 1321   BP: 112/78       Body mass index is 24.59 kg/m².    This a well-developed, well nourished patient in no acute distress.  They are alert and oriented and cooperative to examination.     Examination of the  bilateral knees, the patient's skin is dry and intact, no erythema or ecchymosis, no large joint effusions are appreciated.  No signs symptoms of infection. range of motion, the right knee he may lack a few degrees of extension less than 5 can flex to 120.  Left knee 0-120.  Patellofemoral crepitus.  Medial joint line pain.  Stable anterior-posterior varus and valgus stress.      Pertinent New Results:        XRAY Report / Interpretation:             Assessment:       1. Primary osteoarthritis of right knee    2. Primary osteoarthritis of left knee      Plan:     Primary osteoarthritis of right knee  -     Large Joint Aspiration/Injection: R knee    Primary osteoarthritis of left knee  -     Large Joint Aspiration/Injection: L knee      Follow up in about 3 months (around 2/9/2023) for 3 mth f/up Bilateral knee inj 11/9/2022.    Bilateral osteoarthritis, we injected both knees today, lidocaine and triamcinolone, anterior lateral approach, sterile technique, patient tolerated well.  He will follow up with us in 3 months for repeat injections.  Sooner if he is having problems, he can simply cancel if he wants to delay or if he is feeling better.    [unfilled]  DEMETRIUS Will PA-C    This note was created using [employer] voice recognition software that occasionally misinterprets words or phrases.

## 2022-11-09 NOTE — PROCEDURES
Large Joint Aspiration/Injection: L knee    Date/Time: 11/9/2022 1:30 PM  Performed by: SUJEY Garcia  Authorized by: SUJEY Garcia     Consent Done?:  Yes (Verbal)  Indications:  Arthritis and pain  Site marked: the procedure site was marked    Timeout: prior to procedure the correct patient, procedure, and site was verified    Prep: patient was prepped and draped in usual sterile fashion      Local anesthesia used?: Yes    Local anesthetic:  Lidocaine 1% without epinephrine    Details:  Needle Size:  25 G  Ultrasonic Guidance for needle placement?: No    Location:  Knee  Site:  L knee  Medications:  40 mg triamcinolone acetonide 40 mg/mL  Patient tolerance:  Patient tolerated the procedure well with no immediate complications

## 2022-11-09 NOTE — PROCEDURES
Large Joint Aspiration/Injection: R knee    Date/Time: 11/9/2022 1:30 PM  Performed by: SUJEY Garcia  Authorized by: SUJEY Garcia     Consent Done?:  Yes (Verbal)  Indications:  Arthritis and pain  Site marked: the procedure site was marked    Timeout: prior to procedure the correct patient, procedure, and site was verified    Prep: patient was prepped and draped in usual sterile fashion      Local anesthesia used?: Yes    Local anesthetic:  Lidocaine 1% without epinephrine    Details:  Needle Size:  25 G  Ultrasonic Guidance for needle placement?: No    Location:  Knee  Site:  R knee  Medications:  40 mg triamcinolone acetonide 40 mg/mL  Patient tolerance:  Patient tolerated the procedure well with no immediate complications

## 2022-11-21 NOTE — PT/OT/SLP EVAL
Patient is identified as having Systolic (HFrEF) heart failure that is Chronic. CHF is currently uncontrolled due to Continued edema of extremities, Dyspnea not returned to baseline after multiple doses of IV diuretic and Pulmonary edema/pleural effusion on CXR. Latest ECHO performed and demonstrates- Results for orders placed during the hospital encounter of 11/14/22    Echo    Interpretation Summary  · The left ventricle is normal in size with severely decreased systolic function.  · The estimated ejection fraction is 10-15%.  · There is left ventricular global hypokinesis. No thrombus seen.  · Left ventricular diastolic dysfunction.  · Normal right ventricular size with mildly to moderately reduced right ventricular systolic function.  · Severe left atrial enlargement.  · Mechanically ventilated; cannot use inferior caval vein diameter to estimate central venous pressure.  . Continue acetazolamide (restart GDMT when stable) and monitor clinical status closely. Monitor on telemetry. Patient is off CHF pathway.  Monitor strict Is&Os and daily weights.  Place on fluid restriction of tube feedings only. Continue to stress to patient importance of self efficacy and  on diet for CHF. Last BNP reviewed- and noted below   Recent Labs   Lab 11/15/22  0016   *   .     Speech Language Pathology Evaluation  Bedside Swallow    Patient Name:  Amilcar Phillip   MRN:  [id number]  Admitting Diagnosis: Syncope with normal neurologic examination    Recommendations:                 General Recommendations:  Dysphagia therapy, Cognitive-linguistic evaluation and Modified barium swallow study; neurology referral; referral to speech pathology at next level of care; may benefit from increased level of supervision (currently at assisted living per chart)  Diet recommendations: downgrade diet consistency to Easy to Chew Diet - IDDSI Level 7 as tolerated, Other (Comment) (pending further modification after modified barium swallow), Thin, Other (Comment) (pending possible further modification after modified barium swallow)   Aspiration Precautions: assist with positioning and set-up, Eliminate distractions, Feed only when awake/alert, HOB to 90 degrees and Monitor for s/s of aspiration ; slow rate; hold trays if signs of aspiration  General Precautions: Standard, aspiration, fall  Communication strategies:  provide increased time to answer    History:     76 year old male admitted after fall at assisted living facility.    Pt seen after checking with nursing.  He demonstrates confusion, decreased voice intensity, tremors in tongue.  He follows two-step body commands slowly; demonstrated orientation to city, month (not year).  He is not able to provide detailed hx.    Past Medical History:   Diagnosis Date    Hypercholesteremia     Hypertension     Vitamin D deficiency        Past Surgical History:   Procedure Laterality Date    COLONOSCOPY      HERNIA REPAIR         Social History: Patient lives at assisted living facility..    Prior Intubation HX:  Not associated with this admission    Modified Barium Swallow: may benefit from study    Chest X-Rays: reviewed  X-Ray Chest AP Portable  Order: [id number]   Status: Final result     Visible to patient: Yes (not seen)     Next appt: 08/10/2022  "at 01:30 PM in Orthopedics (SUJEY Moise)     0 Result Notes    Details    Reading Physician Reading Date Result Priority   Savannah Arango MD  [phone] 7/7/2022      Narrative & Impression  XR CHEST 1 VIEW     CLINICAL HISTORY:  76 years Male fall     COMPARISON: 12/23/2019     FINDINGS: Cardiomediastinal silhouette is within normal limits. Lungs are normally expanded with no airspace consolidation. No pleural effusion or pneumothorax. No acute osseous abnormality.     IMPRESSION: No acute pulmonary process.     Electronically signed by:  Savannah Arango MD  7/7/2022 12:51 PM CDT Workstation: [id number]       Prior diet: pt denies restrictions.    Occupation/hobbies/homemaking: former Marine.    Subjective     "Sometimes I sneeze." (when eating/drinking)  Patient goals: no goals stated     Pain/Comfort:  · Pain Rating 1: 0/10    Respiratory Status: Room air    Objective:     Oral Musculature Evaluation  · Oral Musculature: general weakness  · Dentition: other (see comments) (fair dentition, missing some teeth)  · Secretion Management: other (see comments) (occasional spillage of saliva and wet voice quality)  · Mucosal Quality: adequate  · Mandibular Strength and Mobility: impaired  · Oral Labial Strength and Mobility: impaired seal  · Lingual Strength and Mobility: impaired strength  · Velar Elevation: WFL  · Buccal Strength and Mobility: WFL  · Volitional Cough: decreased intensity, productive  · Volitional Swallow: upward hyolaryngeal movement palpated with delay  · Voice Prior to PO Intake: soft, low pitch    Bedside Swallow Eval:   Consistencies Assessed:  · Thin liquids (ice chip x 1, tsp water x 1, cup sips water x 8, straw sips x 5)  · Puree (apple sauce x 4 tsp)  · Soft solids (soft fruit x 4 small pieces)  · Solids (shauna crackers)     Oral Phase:   · occasional anterior spillage of saliva but not noted with drinks/food   · Mildly extended mastication  · Faint residue after solids " cleared with liquid wash  · Adequate lip approximation to straw/spoon    Pharyngeal Phase:   · no coughing, throat clearing or immediate change in voice quality after swallow   · However pt demonstrates intermittent wet voice quality during interaction with clinician; cannot rule out penetration to vocal cords or aspiration    Compensatory Strategies  · n/a at this time    Treatment: pt educated regarding swallow safety precautions; verbalized understanding    Assessment:     Amilcar Phillip is a 76 y.o. male with an SLP diagnosis of Dysphagia (mild oral dysphagia; cannot rule out pharyngeal dysphagia at bedside).  Modified barium swallow recommended.  He would benefit from cognitive evaluation.  Will follow while hospitalized.   Recommendations as above.  Referral to speech pathology at next level of care recommended.   Recommendations re: swallow were messaged to MD/nursing.    Goals:   Multidisciplinary Problems     SLP Goals        Problem: SLP    Goal Priority Disciplines Outcome   SLP Goal     SLP Ongoing, Progressing   Description: 1. Pt will tolerate easy to chew 7(a) regular diet with set-up/assist from staff as needed, following general swallow safety precautions with staff assist.  2. Pt will participate in modified barium swallow study for further assessment of pharyngeal stage of swallow.  3. Pt will participate in cognitive-linguistic evaluation.                   Plan:     · Patient to be seen:  6 x/week   · Plan of Care expires:     · Plan of Care reviewed with:  patient, other (see comments) (messaged MD and nursing)   · SLP Follow-Up:  Yes       Discharge recommendations:  other (see comments) (to be determined, possible skilled nursing)   Barriers to Discharge:  to be determined    Time Tracking:     SLP Treatment Date:   07/09/22  Speech Start Time:  1814  Speech Stop Time:  1838     Speech Total Time (min):  24 min    Billable Minutes: Eval Swallow and Oral Function 24 07/09/2022

## 2023-02-08 ENCOUNTER — OFFICE VISIT (OUTPATIENT)
Dept: ORTHOPEDICS | Facility: CLINIC | Age: 78
End: 2023-02-08
Payer: MEDICARE

## 2023-02-08 VITALS
DIASTOLIC BLOOD PRESSURE: 79 MMHG | WEIGHT: 168 LBS | HEIGHT: 67 IN | SYSTOLIC BLOOD PRESSURE: 108 MMHG | BODY MASS INDEX: 26.37 KG/M2

## 2023-02-08 DIAGNOSIS — M17.11 PRIMARY OSTEOARTHRITIS OF RIGHT KNEE: Primary | ICD-10-CM

## 2023-02-08 DIAGNOSIS — M17.12 PRIMARY OSTEOARTHRITIS OF LEFT KNEE: ICD-10-CM

## 2023-02-08 PROCEDURE — 20610 DRAIN/INJ JOINT/BURSA W/O US: CPT | Mod: 50,S$GLB,, | Performed by: PHYSICIAN ASSISTANT

## 2023-02-08 PROCEDURE — 20610 LARGE JOINT ASPIRATION/INJECTION: R KNEE: ICD-10-PCS | Mod: 50,S$GLB,, | Performed by: PHYSICIAN ASSISTANT

## 2023-02-08 PROCEDURE — 99213 OFFICE O/P EST LOW 20 MIN: CPT | Mod: 25,S$GLB,, | Performed by: PHYSICIAN ASSISTANT

## 2023-02-08 PROCEDURE — 99213 PR OFFICE/OUTPT VISIT, EST, LEVL III, 20-29 MIN: ICD-10-PCS | Mod: 25,S$GLB,, | Performed by: PHYSICIAN ASSISTANT

## 2023-02-08 RX ORDER — TRIAMCINOLONE ACETONIDE 40 MG/ML
40 INJECTION, SUSPENSION INTRA-ARTICULAR; INTRAMUSCULAR
Status: DISCONTINUED | OUTPATIENT
Start: 2023-02-08 | End: 2023-02-08 | Stop reason: HOSPADM

## 2023-02-08 RX ADMIN — TRIAMCINOLONE ACETONIDE 40 MG: 40 INJECTION, SUSPENSION INTRA-ARTICULAR; INTRAMUSCULAR at 01:02

## 2023-02-08 NOTE — PROCEDURES
Large Joint Aspiration/Injection: L knee    Date/Time: 2/8/2023 1:30 PM  Performed by: SUJEY Garcia  Authorized by: SUJEY Garcia     Consent Done?:  Yes (Verbal)  Indications:  Pain  Site marked: the procedure site was marked    Timeout: prior to procedure the correct patient, procedure, and site was verified    Prep: patient was prepped and draped in usual sterile fashion      Local anesthesia used?: Yes    Local anesthetic:  Lidocaine 1% without epinephrine    Details:  Needle Size:  25 G  Ultrasonic Guidance for needle placement?: No    Approach:  Anterolateral  Location:  Knee  Site:  L knee  Medications:  40 mg triamcinolone acetonide 40 mg/mL  Patient tolerance:  Patient tolerated the procedure well with no immediate complications

## 2023-02-08 NOTE — PROCEDURES
Large Joint Aspiration/Injection: R knee    Date/Time: 2/8/2023 1:30 PM  Performed by: SUJEY Garcia  Authorized by: SUJEY Garcia     Consent Done?:  Yes (Verbal)  Indications:  Pain  Site marked: the procedure site was marked    Timeout: prior to procedure the correct patient, procedure, and site was verified    Prep: patient was prepped and draped in usual sterile fashion      Local anesthesia used?: Yes    Local anesthetic:  Lidocaine 1% without epinephrine    Details:  Needle Size:  25 G  Ultrasonic Guidance for needle placement?: No    Approach:  Anterolateral  Location:  Knee  Site:  R knee  Medications:  40 mg triamcinolone acetonide 40 mg/mL  Patient tolerance:  Patient tolerated the procedure well with no immediate complications

## 2023-02-08 NOTE — PROGRESS NOTES
[facility]  [address]  [place] [address]  Phone: [phone]   [phone]    Patient's PCP: Angel Edwards Jr, MD  Referring Provider: No ref. provider found    Subjective:      Chief Complaint:   Chief Complaint   Patient presents with    Left Knee - Pain     BL Knee pain follow up. Received inj 11/09/22 which offered relief until recently. Requesting inj today    Right Knee - Pain     BL Knee pain follow up. Received inj 11/09/22 which offered relief until recently. Requesting inj today         Past Medical History:   Diagnosis Date    Hypercholesteremia     Hypertension     Vitamin D deficiency        Past Surgical History:   Procedure Laterality Date    COLONOSCOPY      HERNIA REPAIR         Current Outpatient Medications   Medication Sig    acetaminophen (TYLENOL) 325 MG tablet Take 2 tablets (650 mg total) by mouth every 6 (six) hours as needed for Pain.    cholecalciferol, vitamin D3, 125 mcg (5,000 unit) capsule TAKE ONE CAPSULE BY MOUTH DAILY    donepeziL (ARICEPT) 10 MG tablet TAKE ONE TABLET BY MOUTH EVERY EVENING    OXcarbazepine (TRILEPTAL) 150 MG Tab TAKE ONE TABLET BY MOUTH EVERY MORNING and TAKE ONE TABLET BY MOUTH EVERY EVENING    rOPINIRole (REQUIP) 0.5 MG tablet TAKE ONE TABLET BY MOUTH EVERY EVENING    rosuvastatin (CRESTOR) 10 MG tablet TAKE ONE TABLET BY MOUTH EVERY DAY    sertraline (ZOLOFT) 50 MG tablet TAKE ONE TABLET BY MOUTH EVERY EVENING    meloxicam (MOBIC) 15 MG tablet Take 15 mg by mouth once daily.    multivitamin (THERAGRAN) per tablet Take 1 tablet by mouth once daily.    valsartan (DIOVAN) 40 MG tablet Take 40 mg by mouth once daily.     No current facility-administered medications for this visit.       Review of patient's allergies indicates:  No Known Allergies    Family History   Problem Relation Age of Onset    Osteoporosis Mother     Vaginal cancer Mother     Stroke Father        Social History     Socioeconomic History    Marital status:     Occupational History    Occupation: retired   Tobacco Use    Smoking status: Former     Types: Cigarettes, Cigars     Quit date:      Years since quittin.1    Smokeless tobacco: Never   Substance and Sexual Activity    Alcohol use: Yes    Drug use: Never    Sexual activity: Not Currently   Social History Narrative    Live in assistive living center       Prior to meeting with the patient I reviewed the medical chart in [facility]. This included reviewing the previous progress notes from our office, review of the patient's last appointment with their primary care provider, review of any visits to the emergency room, and review of any pain management appointments or procedures.    History of present illness: 77-year-old male who presents to clinic today to follow-up on his bilateral knees.  He has known osteoarthritis in bilateral knees.  He has had viscosupplementation and steroid injections.  Last seen and evaluated in the office in November and prior to that August or approximately 3 months ago.  His knees have started to bother him again.  He is here for repeat steroid injections.      Review of Systems:    Constitutional: Negative for chills, fever and weight loss.   HENT: Negative for congestion.    Eyes: Negative for discharge and redness.   Respiratory: Negative for cough and shortness of breath.    Cardiovascular: Negative for chest pain.   Gastrointestinal: Negative for nausea and vomiting.   Musculoskeletal: See HPI.   Skin: Negative for rash.   Neurological: Negative for headaches.   Endo/Heme/Allergies: Does not bruise/bleed easily.   Psychiatric/Behavioral: The patient is not nervous/anxious.    All other systems reviewed and are negative.       Objective:      Physical Examination:    Vital Signs:    Vitals:    23 1327   BP: 108/79       Body mass index is 24.59 kg/m².    This a well-developed, well nourished patient in no acute distress.  They are alert and oriented and cooperative  to examination.     Examination of the bilateral knees is essentially unchanged. The patient's skin is dry and intact, no erythema or ecchymosis, no large joint effusions are appreciated.  No signs symptoms of infection. range of motion, the right knee he may lack a few degrees of extension less than 5 can flex to 120.  Left knee 0-120.  Patellofemoral crepitus.  Medial joint line pain.  Stable anterior-posterior varus and valgus stress.      Pertinent New Results:        XRAY Report / Interpretation:             Assessment:       1. Primary osteoarthritis of right knee    2. Primary osteoarthritis of left knee      Plan:     Primary osteoarthritis of right knee    Primary osteoarthritis of left knee        Follow up in about 3 months (around 5/8/2023) for Re-check symptoms, Tues/Thurs with Dr. Rodriguez.    Osteoarthritis bilateral knees, we injected both knees today again, anterior lateral approach sterile technique lidocaine and triamcinolone patient tolerated well.  He will continue to follow up on an every 3 month basis for continued injections.  His sister who is with him today, states that he only recently started to complain of pain again so he really is getting good relief of his symptoms with the injections.      DEMETRIUS Will, PA-C    This note was created using [employer] voice recognition software that occasionally misinterprets words or phrases.

## 2023-03-22 ENCOUNTER — PATIENT MESSAGE (OUTPATIENT)
Dept: FAMILY MEDICINE | Facility: CLINIC | Age: 78
End: 2023-03-22

## 2023-03-29 ENCOUNTER — OFFICE VISIT (OUTPATIENT)
Dept: FAMILY MEDICINE | Facility: CLINIC | Age: 78
End: 2023-03-29
Payer: MEDICARE

## 2023-03-29 VITALS
BODY MASS INDEX: 26.84 KG/M2 | HEIGHT: 67 IN | WEIGHT: 171 LBS | TEMPERATURE: 98 F | SYSTOLIC BLOOD PRESSURE: 116 MMHG | HEART RATE: 65 BPM | OXYGEN SATURATION: 96 % | DIASTOLIC BLOOD PRESSURE: 68 MMHG

## 2023-03-29 DIAGNOSIS — I10 PRIMARY HYPERTENSION: Primary | ICD-10-CM

## 2023-03-29 DIAGNOSIS — D64.9 ANEMIA, UNSPECIFIED TYPE: ICD-10-CM

## 2023-03-29 DIAGNOSIS — F03.C0 SEVERE DEMENTIA WITHOUT BEHAVIORAL DISTURBANCE, PSYCHOTIC DISTURBANCE, MOOD DISTURBANCE, OR ANXIETY, UNSPECIFIED DEMENTIA TYPE: ICD-10-CM

## 2023-03-29 DIAGNOSIS — H61.23 HEARING LOSS DUE TO CERUMEN IMPACTION, BILATERAL: ICD-10-CM

## 2023-03-29 PROCEDURE — 99213 OFFICE O/P EST LOW 20 MIN: CPT | Mod: 25,S$GLB,, | Performed by: INTERNAL MEDICINE

## 2023-03-29 PROCEDURE — 99213 PR OFFICE/OUTPT VISIT, EST, LEVL III, 20-29 MIN: ICD-10-PCS | Mod: 25,S$GLB,, | Performed by: INTERNAL MEDICINE

## 2023-03-29 PROCEDURE — 69209 REMOVE IMPACTED EAR WAX UNI: CPT | Mod: 50,S$GLB,, | Performed by: INTERNAL MEDICINE

## 2023-03-29 PROCEDURE — 69209 EAR CERUMEN REMOVAL: ICD-10-PCS | Mod: 50,S$GLB,, | Performed by: INTERNAL MEDICINE

## 2023-03-29 PROCEDURE — 86580 PR  TB INTRADERMAL TEST: ICD-10-PCS | Mod: S$GLB,,, | Performed by: INTERNAL MEDICINE

## 2023-03-29 PROCEDURE — 86580 TB INTRADERMAL TEST: CPT | Mod: S$GLB,,, | Performed by: INTERNAL MEDICINE

## 2023-03-29 RX ORDER — NEOMYCIN SULFATE, POLYMYXIN B SULFATE AND HYDROCORTISONE 10; 3.5; 1 MG/ML; MG/ML; [USP'U]/ML
3 SUSPENSION/ DROPS AURICULAR (OTIC) 3 TIMES DAILY
Qty: 10 ML | Refills: 0 | Status: SHIPPED | OUTPATIENT
Start: 2023-03-29 | End: 2023-07-13

## 2023-03-29 NOTE — PROGRESS NOTES
Subjective:       Patient ID: Amilcar Phillip is a 77 y.o. male.    Chief Complaint: Hyperlipidemia (6 months follow up)    Here for follow up.  No further reports of significant behavioral issues but cognition does continue to decline. He weaned off risperdal without any changes in behavior.  He lives in an assisted living facility; he has a sitter 8 hours per day and they also check on him in middle of night.  In reviewing meds, we had valsartan on list but it isn't on the assited living list.   He needs annual TB test for assisted living.   Family has noted decreased hearing.  Has had issues with wax in past.    Hypertension  This is a chronic problem. The problem is controlled. Pertinent negatives include no anxiety, chest pain, headaches, malaise/fatigue, neck pain, palpitations, peripheral edema or shortness of breath. Past treatments include angiotensin blockers. Compliance problems include psychosocial issues (unclear why it isn't on their list).    Review of Systems   Constitutional:  Negative for activity change, appetite change, chills, diaphoresis, fatigue, fever, malaise/fatigue and unexpected weight change.   HENT:  Positive for hearing loss, rhinorrhea and trouble swallowing. Negative for congestion, ear discharge, ear pain, nosebleeds, postnasal drip, sinus pressure, sinus pain, sneezing, sore throat, tinnitus and voice change.    Eyes:  Negative for photophobia, pain, discharge, redness, itching and visual disturbance.   Respiratory:  Negative for apnea, cough, choking, chest tightness, shortness of breath and wheezing.    Cardiovascular:  Negative for chest pain, palpitations and leg swelling.   Gastrointestinal:  Negative for abdominal distention, abdominal pain, blood in stool, constipation, diarrhea, nausea and vomiting.   Endocrine: Negative for cold intolerance, heat intolerance, polydipsia and polyuria.   Genitourinary:  Negative for decreased urine volume, difficulty urinating, dysuria,  enuresis, flank pain, frequency, genital sores, hematuria, penile discharge, penile pain, scrotal swelling, testicular pain and urgency.   Musculoskeletal:  Positive for arthralgias (both knees). Negative for back pain, gait problem, joint swelling, myalgias, neck pain and neck stiffness.   Skin:  Negative for rash and wound.   Allergic/Immunologic: Negative for environmental allergies, food allergies and immunocompromised state.   Neurological:  Negative for dizziness, tremors, seizures, syncope, facial asymmetry, speech difficulty, weakness, light-headedness, numbness and headaches.   Hematological:  Negative for adenopathy. Does not bruise/bleed easily.   Psychiatric/Behavioral:  Positive for confusion and dysphoric mood. Negative for decreased concentration, hallucinations, self-injury, sleep disturbance and suicidal ideas. The patient is not nervous/anxious.      Past Medical History:   Diagnosis Date    Hypercholesteremia     Hypertension     Vitamin D deficiency       Past Surgical History:   Procedure Laterality Date    COLONOSCOPY      HERNIA REPAIR         Family History   Problem Relation Age of Onset    Osteoporosis Mother     Vaginal cancer Mother     Stroke Father        Social History     Socioeconomic History    Marital status:    Occupational History    Occupation: retired   Tobacco Use    Smoking status: Former     Types: Cigarettes, Cigars     Quit date:      Years since quittin.2    Smokeless tobacco: Never   Substance and Sexual Activity    Alcohol use: Yes    Drug use: Never    Sexual activity: Not Currently   Social History Narrative    Live in assistive living center       Current Outpatient Medications   Medication Sig Dispense Refill    acetaminophen (TYLENOL) 325 MG tablet Take 2 tablets (650 mg total) by mouth every 6 (six) hours as needed for Pain.  0    cholecalciferol, vitamin D3, 125 mcg (5,000 unit) capsule TAKE ONE CAPSULE BY MOUTH DAILY 90 capsule 1    donepeziL  "(ARICEPT) 10 MG tablet TAKE ONE TABLET BY MOUTH EVERY EVENING 90 tablet 1    multivitamin (THERAGRAN) per tablet Take 1 tablet by mouth once daily.      OXcarbazepine (TRILEPTAL) 150 MG Tab TAKE ONE TABLET BY MOUTH EVERY MORNING and TAKE ONE TABLET BY MOUTH EVERY EVENING 180 tablet 1    rOPINIRole (REQUIP) 0.5 MG tablet TAKE ONE TABLET BY MOUTH EVERY EVENING 90 tablet 1    rosuvastatin (CRESTOR) 10 MG tablet TAKE ONE TABLET BY MOUTH EVERY DAY 90 tablet 1    sertraline (ZOLOFT) 50 MG tablet TAKE ONE TABLET BY MOUTH EVERY EVENING 45 tablet 1    meloxicam (MOBIC) 15 MG tablet Take 15 mg by mouth once daily.       Current Facility-Administered Medications   Medication Dose Route Frequency Provider Last Rate Last Admin    tuberculin injection 5 Units  5 Units Intradermal 1 time in Clinic/HOD Angel Edwards Jr., MD           Review of patient's allergies indicates:  No Known Allergies  Objective:    HPI     Hyperlipidemia     Additional comments: 6 months follow up          Last edited by Tomer Sampson MA on 3/29/2023  1:55 PM.      Blood pressure 116/68, pulse 65, temperature 97.7 °F (36.5 °C), temperature source Oral, height 5' 7" (1.702 m), weight 77.6 kg (171 lb), SpO2 96 %. Body mass index is 26.78 kg/m².   Physical Exam  Vitals and nursing note reviewed.   Constitutional:       General: He is not in acute distress.     Appearance: He is well-developed. He is not ill-appearing, toxic-appearing or diaphoretic.   HENT:      Right Ear: There is impacted cerumen.      Left Ear: There is impacted cerumen.      Nose: Nose normal.   Eyes:      General: No scleral icterus.        Right eye: No discharge.         Left eye: No discharge.      Conjunctiva/sclera: Conjunctivae normal.   Neck:      Vascular: No carotid bruit.   Cardiovascular:      Rate and Rhythm: Normal rate and regular rhythm.      Heart sounds: Normal heart sounds. No murmur heard.  Pulmonary:      Effort: Pulmonary effort is normal. No respiratory " distress.      Breath sounds: Normal breath sounds. No decreased breath sounds, wheezing, rhonchi or rales.   Abdominal:      General: There is no distension.      Palpations: Abdomen is soft.      Tenderness: There is no abdominal tenderness. There is no guarding or rebound.   Genitourinary:     Rectum: Guaiac result negative. No anal fissure or external hemorrhoid.   Musculoskeletal:      Thoracic back: Deformity (kyphosis) present.      Comments: Crepitus knees   Skin:     General: Skin is warm and dry.   Neurological:      Mental Status: He is alert.      Cranial Nerves: No facial asymmetry.      Motor: No tremor.      Gait: Gait abnormal (ambulation with walker).   Psychiatric:         Speech: Speech normal.         Behavior: Behavior normal.         Cognition and Memory: Memory is impaired.         Ear Cerumen Removal    Date/Time: 3/29/2023 2:00 PM  Performed by: Angel Edwards Jr., MD  Authorized by: Angel Edwards Jr., MD     Consent Done?:  Yes (Verbal)  Medication Used:  Debrox  Location details:  Both ears  Procedure type: irrigation    Cerumen  Removal Results:  Cerumen partially removed (halted d/t canal irritation and bleeding)  Patient tolerance of procedure: there was some bleeding on the left and some irritation that looked like it may start to bleed on right so procedure halted.    Assessment:       1. Primary hypertension    2. Anemia, unspecified type    3. Living in assisted living    4. Severe dementia without behavioral disturbance, psychotic disturbance, mood disturbance, or anxiety, unspecified dementia type        Plan:       Amilcar was seen today for hyperlipidemia.    Diagnoses and all orders for this visit:    Primary hypertension  Comments:  He apparently hasn't been taking valsartan and BP is fine so will leave him off of it.   Orders:  -     Comprehensive Metabolic Panel; Future  -     Lipid Panel; Future  -     Urinalysis; Future  -     Comprehensive Metabolic Panel  -      Lipid Panel  -     Urinalysis    Anemia, unspecified type  -     CBC Auto Differential; Future  -     CBC Auto Differential    Living in assisted living  Comments:  Staff at facility will read  Orders:  -     tuberculin injection 5 Units    Severe dementia without behavioral disturbance, psychotic disturbance, mood disturbance, or anxiety, unspecified dementia type

## 2023-04-08 LAB
ALBUMIN SERPL-MCNC: 4.2 G/DL (ref 3.6–5.1)
ALBUMIN/GLOB SERPL: 1.6 (CALC) (ref 1–2.5)
ALP SERPL-CCNC: 71 U/L (ref 35–144)
ALT SERPL-CCNC: 18 U/L (ref 9–46)
APPEARANCE UR: CLEAR
AST SERPL-CCNC: 16 U/L (ref 10–35)
BASOPHILS # BLD AUTO: 33 CELLS/UL (ref 0–200)
BASOPHILS NFR BLD AUTO: 0.4 %
BILIRUB SERPL-MCNC: 0.6 MG/DL (ref 0.2–1.2)
BILIRUB UR QL STRIP: NEGATIVE
BUN SERPL-MCNC: 18 MG/DL (ref 7–25)
BUN/CREAT SERPL: NORMAL (CALC) (ref 6–22)
CALCIUM SERPL-MCNC: 9.4 MG/DL (ref 8.6–10.3)
CHLORIDE SERPL-SCNC: 104 MMOL/L (ref 98–110)
CHOLEST SERPL-MCNC: 163 MG/DL
CHOLEST/HDLC SERPL: 3.3 (CALC)
CO2 SERPL-SCNC: 28 MMOL/L (ref 20–32)
COLOR UR: YELLOW
CREAT SERPL-MCNC: 0.82 MG/DL (ref 0.7–1.28)
EGFR: 89 ML/MIN/1.73M2
EOSINOPHIL # BLD AUTO: 189 CELLS/UL (ref 15–500)
EOSINOPHIL NFR BLD AUTO: 2.3 %
ERYTHROCYTE [DISTWIDTH] IN BLOOD BY AUTOMATED COUNT: 13.3 % (ref 11–15)
GLOBULIN SER CALC-MCNC: 2.7 G/DL (CALC) (ref 1.9–3.7)
GLUCOSE SERPL-MCNC: 74 MG/DL (ref 65–99)
GLUCOSE UR QL STRIP: NEGATIVE
HCT VFR BLD AUTO: 41.8 % (ref 38.5–50)
HDLC SERPL-MCNC: 50 MG/DL
HGB BLD-MCNC: 13.9 G/DL (ref 13.2–17.1)
HGB UR QL STRIP: NEGATIVE
KETONES UR QL STRIP: NEGATIVE
LDLC SERPL CALC-MCNC: 96 MG/DL (CALC)
LEUKOCYTE ESTERASE UR QL STRIP: NEGATIVE
LYMPHOCYTES # BLD AUTO: 1870 CELLS/UL (ref 850–3900)
LYMPHOCYTES NFR BLD AUTO: 22.8 %
MCH RBC QN AUTO: 31.2 PG (ref 27–33)
MCHC RBC AUTO-ENTMCNC: 33.3 G/DL (ref 32–36)
MCV RBC AUTO: 93.7 FL (ref 80–100)
MONOCYTES # BLD AUTO: 845 CELLS/UL (ref 200–950)
MONOCYTES NFR BLD AUTO: 10.3 %
NEUTROPHILS # BLD AUTO: 5264 CELLS/UL (ref 1500–7800)
NEUTROPHILS NFR BLD AUTO: 64.2 %
NITRITE UR QL STRIP: NEGATIVE
NONHDLC SERPL-MCNC: 113 MG/DL (CALC)
PH UR STRIP: 6 [PH] (ref 5–8)
PLATELET # BLD AUTO: 250 THOUSAND/UL (ref 140–400)
PMV BLD REES-ECKER: 10.6 FL (ref 7.5–12.5)
POTASSIUM SERPL-SCNC: 4.4 MMOL/L (ref 3.5–5.3)
PROT SERPL-MCNC: 6.9 G/DL (ref 6.1–8.1)
PROT UR QL STRIP: NEGATIVE
RBC # BLD AUTO: 4.46 MILLION/UL (ref 4.2–5.8)
SODIUM SERPL-SCNC: 140 MMOL/L (ref 135–146)
SP GR UR STRIP: 1.02 (ref 1–1.03)
TRIGL SERPL-MCNC: 84 MG/DL
WBC # BLD AUTO: 8.2 THOUSAND/UL (ref 3.8–10.8)

## 2023-05-10 ENCOUNTER — OFFICE VISIT (OUTPATIENT)
Dept: ORTHOPEDICS | Facility: CLINIC | Age: 78
End: 2023-05-10
Payer: MEDICARE

## 2023-05-10 VITALS
BODY MASS INDEX: 26.84 KG/M2 | DIASTOLIC BLOOD PRESSURE: 78 MMHG | HEIGHT: 67 IN | WEIGHT: 171 LBS | SYSTOLIC BLOOD PRESSURE: 122 MMHG

## 2023-05-10 DIAGNOSIS — M17.11 PRIMARY OSTEOARTHRITIS OF RIGHT KNEE: Primary | ICD-10-CM

## 2023-05-10 DIAGNOSIS — M17.12 PRIMARY OSTEOARTHRITIS OF LEFT KNEE: ICD-10-CM

## 2023-05-10 PROCEDURE — 20610 LARGE JOINT ASPIRATION/INJECTION: R KNEE: ICD-10-PCS | Mod: 50,S$GLB,, | Performed by: PHYSICIAN ASSISTANT

## 2023-05-10 PROCEDURE — 20610 DRAIN/INJ JOINT/BURSA W/O US: CPT | Mod: 50,S$GLB,, | Performed by: PHYSICIAN ASSISTANT

## 2023-05-10 PROCEDURE — 99213 PR OFFICE/OUTPT VISIT, EST, LEVL III, 20-29 MIN: ICD-10-PCS | Mod: 25,S$GLB,, | Performed by: PHYSICIAN ASSISTANT

## 2023-05-10 PROCEDURE — 99213 OFFICE O/P EST LOW 20 MIN: CPT | Mod: 25,S$GLB,, | Performed by: PHYSICIAN ASSISTANT

## 2023-05-10 RX ORDER — TRIAMCINOLONE ACETONIDE 40 MG/ML
40 INJECTION, SUSPENSION INTRA-ARTICULAR; INTRAMUSCULAR
Status: DISCONTINUED | OUTPATIENT
Start: 2023-05-10 | End: 2023-05-10 | Stop reason: HOSPADM

## 2023-05-10 RX ADMIN — TRIAMCINOLONE ACETONIDE 40 MG: 40 INJECTION, SUSPENSION INTRA-ARTICULAR; INTRAMUSCULAR at 09:05

## 2023-05-10 NOTE — PROCEDURES
Large Joint Aspiration/Injection: L knee    Date/Time: 5/10/2023 9:30 AM  Performed by: SUJEY Garcia  Authorized by: SUJEY Garcia     Consent Done?:  Yes (Verbal)  Indications:  Pain  Site marked: the procedure site was marked    Timeout: prior to procedure the correct patient, procedure, and site was verified    Prep: patient was prepped and draped in usual sterile fashion      Local anesthesia used?: Yes    Local anesthetic:  Lidocaine 1% without epinephrine    Details:  Needle Size:  25 G  Ultrasonic Guidance for needle placement?: No    Approach:  Anterolateral  Location:  Knee  Site:  L knee  Medications:  40 mg triamcinolone acetonide 40 mg/mL  Patient tolerance:  Patient tolerated the procedure well with no immediate complications

## 2023-05-10 NOTE — PROCEDURES
Large Joint Aspiration/Injection: R knee    Date/Time: 5/10/2023 9:30 AM  Performed by: SUJEY Garcia  Authorized by: SUJEY Garcia     Consent Done?:  Yes (Verbal)  Indications:  Pain  Site marked: the procedure site was marked    Timeout: prior to procedure the correct patient, procedure, and site was verified    Prep: patient was prepped and draped in usual sterile fashion      Local anesthesia used?: Yes    Local anesthetic:  Lidocaine 1% without epinephrine    Details:  Needle Size:  25 G  Ultrasonic Guidance for needle placement?: No    Approach:  Anterolateral  Location:  Knee  Site:  R knee  Medications:  40 mg triamcinolone acetonide 40 mg/mL  Patient tolerance:  Patient tolerated the procedure well with no immediate complications

## 2023-05-10 NOTE — PROGRESS NOTES
[facility]  [address]  [place] [address]  Phone: [phone]   [phone]    Patient's PCP: Angel Edwards Jr, MD  Referring Provider: No ref. provider found    Subjective:      Chief Complaint:   Chief Complaint   Patient presents with    Left Knee - Pain     Pt is f/u on b/l knee inj 2/8/23, pt stated that the inj helped for a month and a half, he also states the r knee is worse since last visit    Right Knee - Pain       Past Medical History:   Diagnosis Date    Dementia with behavioral disturbance 12/23/2021    Hypercholesteremia     Hypertension     Vitamin D deficiency        Past Surgical History:   Procedure Laterality Date    COLONOSCOPY      HERNIA REPAIR         Current Outpatient Medications   Medication Sig    acetaminophen (TYLENOL) 325 MG tablet Take 2 tablets (650 mg total) by mouth every 6 (six) hours as needed for Pain.    cholecalciferol, vitamin D3, 125 mcg (5,000 unit) capsule TAKE ONE CAPSULE BY MOUTH DAILY    donepeziL (ARICEPT) 10 MG tablet TAKE ONE TABLET BY MOUTH EVERY EVENING    meloxicam (MOBIC) 15 MG tablet Take 15 mg by mouth once daily.    multivitamin (THERAGRAN) per tablet Take 1 tablet by mouth once daily.    neomycin-polymyxin-hydrocortisone (CORTISPORIN) 3.5-10,000-1 mg/mL-unit/mL-% otic suspension Place 3 drops into both ears 3 (three) times daily.    OXcarbazepine (TRILEPTAL) 150 MG Tab TAKE ONE TABLET BY MOUTH EVERY MORNING and TAKE ONE TABLET BY MOUTH EVERY EVENING    rOPINIRole (REQUIP) 0.5 MG tablet TAKE ONE TABLET BY MOUTH EVERY EVENING    rosuvastatin (CRESTOR) 10 MG tablet TAKE ONE TABLET BY MOUTH EVERY DAY    sertraline (ZOLOFT) 50 MG tablet TAKE ONE TABLET BY MOUTH EVERY EVENING     No current facility-administered medications for this visit.       Review of patient's allergies indicates:  No Known Allergies    Family History   Problem Relation Age of Onset    Osteoporosis Mother     Vaginal cancer Mother     Stroke Father         Social History     Socioeconomic History    Marital status:    Occupational History    Occupation: retired   Tobacco Use    Smoking status: Former     Types: Cigarettes, Cigars     Quit date:      Years since quittin.3    Smokeless tobacco: Never   Substance and Sexual Activity    Alcohol use: Yes    Drug use: Never    Sexual activity: Not Currently   Social History Narrative    Live in assistive living center       Prior to meeting with the patient I reviewed the medical chart in [facility]. This included reviewing the previous progress notes from our office, review of the patient's last appointment with their primary care provider, review of any visits to the emergency room, and review of any pain management appointments or procedures.    History of present illness: 77-year-old male who returns to clinic today to follow-up on his bilateral knees.  He has known osteoarthritis in bilateral knees.  He has had viscosupplementation and steroid injections.  Last seen and evaluated in the office in  and prior to that August or approximately 3 months ago.  His knees have started to bother him again.  He is here for repeat steroid injections.      Review of Systems:    Constitutional: Negative for chills, fever and weight loss.   HENT: Negative for congestion.    Eyes: Negative for discharge and redness.   Respiratory: Negative for cough and shortness of breath.    Cardiovascular: Negative for chest pain.   Gastrointestinal: Negative for nausea and vomiting.   Musculoskeletal: See HPI.   Skin: Negative for rash.   Neurological: Negative for headaches.   Endo/Heme/Allergies: Does not bruise/bleed easily.   Psychiatric/Behavioral: The patient is not nervous/anxious.    All other systems reviewed and are negative.       Objective:      Physical Examination:    Vital Signs:  There were no vitals filed for this visit.    There is no height or weight on file to calculate BMI.    This a  well-developed, well nourished patient in no acute distress.  They are alert and oriented and cooperative to examination.     Examination of the bilateral knees remains essentially unchanged. The patient's skin is dry and intact, no erythema or ecchymosis, no large joint effusions are appreciated.  No signs symptoms of infection. range of motion, the right knee he may lack a few degrees of extension less than 5 can flex to 120.  Left knee 0-120.  Patellofemoral crepitus.  Medial joint line pain.  Stable anterior-posterior varus and valgus stress.      Pertinent New Results:        XRAY Report / Interpretation:             Assessment:       1. Primary osteoarthritis of right knee    2. Primary osteoarthritis of left knee      Plan:     Primary osteoarthritis of right knee    Primary osteoarthritis of left knee        Follow up in about 3 months (around 8/10/2023) for Re-check symptoms, Inj f/u.    77-year-old male, presents to clinic today again for his bilateral knees.  We have been seeing him periodically for knee injections.  We reinjected both knees today.  He has dementia, he lives at [facility], he does have a caretaker with him daily.  Injections do not seem to be providing meaningful relief in his symptoms, his quality of life has suffered quite a bit as well he does not walk as much because of his knee pain I think he could be a good candidate for knee replacement surgery.  He does not take any blood thinners.  No significant cardiovascular or pulmonary issues.  But he does have significant dementia.    Will have follow-up in 3 months, Tuesday Thursday with Dr. Rodriguez, revisit the conversation about surgery.      Aldo Shane, PAYAMS, PA-C    This note was created using WITOI voice recognition software that occasionally misinterprets words or phrases.

## 2023-05-17 RX ORDER — VALSARTAN 40 MG/1
40 TABLET ORAL DAILY
COMMUNITY
Start: 2019-10-17

## 2023-05-17 RX ORDER — PRAVASTATIN SODIUM 10 MG
10 TABLET ORAL
COMMUNITY
Start: 2019-10-17

## 2023-06-16 DIAGNOSIS — F03.918 DEMENTIA WITH BEHAVIORAL DISTURBANCE: ICD-10-CM

## 2023-06-16 RX ORDER — ROPINIROLE 0.5 MG/1
TABLET, FILM COATED ORAL
Qty: 90 TABLET | Refills: 1 | Status: SHIPPED | OUTPATIENT
Start: 2023-06-16 | End: 2023-12-18

## 2023-06-16 RX ORDER — SERTRALINE HYDROCHLORIDE 50 MG/1
TABLET, FILM COATED ORAL
Qty: 90 TABLET | Refills: 1 | Status: SHIPPED | OUTPATIENT
Start: 2023-06-16 | End: 2023-12-18

## 2023-06-30 ENCOUNTER — PATIENT MESSAGE (OUTPATIENT)
Dept: FAMILY MEDICINE | Facility: CLINIC | Age: 78
End: 2023-06-30

## 2023-06-30 DIAGNOSIS — R53.1 SPELL OF GENERALIZED WEAKNESS: Primary | ICD-10-CM

## 2023-06-30 DIAGNOSIS — R26.9 GAIT DISTURBANCE: ICD-10-CM

## 2023-07-03 ENCOUNTER — PATIENT MESSAGE (OUTPATIENT)
Dept: FAMILY MEDICINE | Facility: CLINIC | Age: 78
End: 2023-07-03

## 2023-07-11 ENCOUNTER — OFFICE VISIT (OUTPATIENT)
Dept: FAMILY MEDICINE | Facility: CLINIC | Age: 78
End: 2023-07-11
Payer: MEDICARE

## 2023-07-11 VITALS
HEART RATE: 70 BPM | BODY MASS INDEX: 27 KG/M2 | DIASTOLIC BLOOD PRESSURE: 66 MMHG | HEIGHT: 67 IN | WEIGHT: 172 LBS | SYSTOLIC BLOOD PRESSURE: 126 MMHG | TEMPERATURE: 98 F | OXYGEN SATURATION: 95 %

## 2023-07-11 DIAGNOSIS — F03.918 DEMENTIA WITH BEHAVIORAL DISTURBANCE: ICD-10-CM

## 2023-07-11 DIAGNOSIS — R53.1 GENERALIZED WEAKNESS: ICD-10-CM

## 2023-07-11 DIAGNOSIS — I10 PRIMARY HYPERTENSION: ICD-10-CM

## 2023-07-11 DIAGNOSIS — E78.00 HYPERCHOLESTEREMIA: ICD-10-CM

## 2023-07-11 DIAGNOSIS — F03.918 DEMENTIA WITH BEHAVIORAL DISTURBANCE: Primary | ICD-10-CM

## 2023-07-11 PROCEDURE — 99214 PR OFFICE/OUTPT VISIT, EST, LEVL IV, 30-39 MIN: ICD-10-PCS | Mod: S$GLB,,, | Performed by: INTERNAL MEDICINE

## 2023-07-11 PROCEDURE — 99214 OFFICE O/P EST MOD 30 MIN: CPT | Mod: S$GLB,,, | Performed by: INTERNAL MEDICINE

## 2023-07-11 RX ORDER — OXCARBAZEPINE 150 MG/1
TABLET, FILM COATED ORAL
Qty: 180 TABLET | Refills: 1 | Status: SHIPPED | OUTPATIENT
Start: 2023-07-11

## 2023-07-11 RX ORDER — DONEPEZIL HYDROCHLORIDE 10 MG/1
10 TABLET, FILM COATED ORAL NIGHTLY
Qty: 90 TABLET | Refills: 1 | Status: SHIPPED | OUTPATIENT
Start: 2023-07-11 | End: 2024-01-29

## 2023-07-11 RX ORDER — ROSUVASTATIN CALCIUM 10 MG/1
10 TABLET, COATED ORAL DAILY
Qty: 90 TABLET | Refills: 1 | Status: SHIPPED | OUTPATIENT
Start: 2023-07-11 | End: 2024-01-22

## 2023-07-11 NOTE — PROGRESS NOTES
Subjective:       Patient ID: Amilcar Phillip is a 77 y.o. male.    Chief Complaint: Fatigue, Referral (Updated needed for hh), and Mass (Red growth on neck)    Here for routine follow up; last visit note, most recent available labs, and health maintenance topics reviewed.  No further reports of significant behavioral issues but cognition does continue to decline.   He lives in an assisted living facility; he has a sitter 8 hours per day and they also check on him in middle of night.  He was diagnosed with COVID a few weeks ago and has been somewhat slow to recover.  Respiratory issues improved but he remains weak; having difficulty getting up and down, ambulating without assistance.   HH was ordered they wouldn't come out until he had an office visit.      Hypertension  This is a chronic problem. The problem is controlled (not currently on any meds). Pertinent negatives include no anxiety, chest pain, headaches, malaise/fatigue, neck pain, palpitations, peripheral edema or shortness of breath. Past treatments include angiotensin blockers.   Review of Systems   Constitutional:  Positive for fatigue. Negative for activity change, appetite change, chills, diaphoresis, fever, malaise/fatigue and unexpected weight change.   HENT:  Positive for postnasal drip and rhinorrhea. Negative for congestion, ear discharge, ear pain, hearing loss, nosebleeds, sinus pressure, sinus pain, sneezing, sore throat, tinnitus, trouble swallowing and voice change.    Eyes:  Negative for photophobia, pain, discharge, redness, itching and visual disturbance.   Respiratory:  Positive for cough. Negative for apnea, choking, chest tightness, shortness of breath and wheezing.    Cardiovascular:  Negative for chest pain, palpitations and leg swelling.   Gastrointestinal:  Negative for abdominal distention, abdominal pain, blood in stool, constipation, diarrhea, nausea and vomiting.   Endocrine: Negative for cold intolerance, heat  intolerance, polydipsia and polyuria.   Genitourinary:  Negative for decreased urine volume, difficulty urinating, dysuria, enuresis, flank pain, frequency, genital sores, hematuria, penile discharge, penile pain, scrotal swelling, testicular pain and urgency.   Musculoskeletal:  Negative for arthralgias, back pain, gait problem, joint swelling, myalgias, neck pain and neck stiffness.   Skin:  Negative for rash and wound.   Allergic/Immunologic: Negative for environmental allergies, food allergies and immunocompromised state.   Neurological:  Positive for weakness. Negative for dizziness, tremors, seizures, syncope, facial asymmetry, speech difficulty, light-headedness, numbness and headaches.   Hematological:  Negative for adenopathy. Does not bruise/bleed easily.   Psychiatric/Behavioral:  Positive for confusion and decreased concentration. Negative for hallucinations, self-injury, sleep disturbance and suicidal ideas. The patient is not nervous/anxious.      Past Medical History:   Diagnosis Date    Dementia with behavioral disturbance 2021    Hypercholesteremia     Hypertension     Vitamin D deficiency       Past Surgical History:   Procedure Laterality Date    COLONOSCOPY      HERNIA REPAIR         Family History   Problem Relation Age of Onset    Osteoporosis Mother     Vaginal cancer Mother     Stroke Father        Social History     Socioeconomic History    Marital status:    Occupational History    Occupation: retired   Tobacco Use    Smoking status: Former     Types: Cigarettes, Cigars     Quit date:      Years since quittin.5    Smokeless tobacco: Never   Substance and Sexual Activity    Alcohol use: Yes    Drug use: Never    Sexual activity: Not Currently   Social History Narrative    Live in assistive living center       Current Outpatient Medications   Medication Sig Dispense Refill    acetaminophen (TYLENOL) 325 MG tablet Take 2 tablets (650 mg total) by mouth every 6 (six) hours  "as needed for Pain.  0    cholecalciferol, vitamin D3, 125 mcg (5,000 unit) capsule TAKE ONE CAPSULE BY MOUTH DAILY 90 capsule 1    multivitamin (THERAGRAN) per tablet Take 1 tablet by mouth once daily.      rOPINIRole (REQUIP) 0.5 MG tablet TAKE ONE TABLET BY MOUTH EVERY EVENING 90 tablet 1    sertraline (ZOLOFT) 50 MG tablet TAKE ONE TABLET BY MOUTH EVERY EVENING 90 tablet 1    donepeziL (ARICEPT) 10 MG tablet Take 1 tablet (10 mg total) by mouth every evening. 90 tablet 1    meloxicam (MOBIC) 15 MG tablet Take 15 mg by mouth once daily.      OXcarbazepine (TRILEPTAL) 150 MG Tab TAKE ONE TABLET BY MOUTH EVERY MORNING and TAKE ONE TABLET BY MOUTH EVERY EVENING 180 tablet 1    rosuvastatin (CRESTOR) 10 MG tablet Take 1 tablet (10 mg total) by mouth once daily. 90 tablet 1     No current facility-administered medications for this visit.       Review of patient's allergies indicates:  No Known Allergies  Objective:    HPI     Referral     Additional comments: Updated needed for hh           Mass     Additional comments: Red growth on neck          Last edited by Tomer Sampson MA on 7/11/2023  4:17 PM.      Blood pressure 126/66, pulse 70, temperature 98.1 °F (36.7 °C), temperature source Temporal, height 5' 7" (1.702 m), weight 78 kg (172 lb), SpO2 95 %. Body mass index is 26.94 kg/m².   Physical Exam  Vitals and nursing note reviewed.   Constitutional:       General: He is not in acute distress.     Appearance: He is well-developed. He is not ill-appearing, toxic-appearing or diaphoretic.   HENT:      Nose: Nose normal.   Eyes:      General: No scleral icterus.        Right eye: No discharge.         Left eye: No discharge.      Conjunctiva/sclera: Conjunctivae normal.   Neck:      Vascular: No carotid bruit.   Cardiovascular:      Rate and Rhythm: Normal rate and regular rhythm.      Heart sounds: Normal heart sounds. No murmur heard.  Pulmonary:      Effort: Pulmonary effort is normal. No respiratory distress.    "   Breath sounds: Normal breath sounds. No decreased breath sounds, wheezing, rhonchi or rales.   Abdominal:      General: There is no distension.      Palpations: Abdomen is soft.      Tenderness: There is no abdominal tenderness. There is no guarding or rebound.   Genitourinary:     Rectum: Guaiac result negative. No anal fissure or external hemorrhoid.   Musculoskeletal:      Thoracic back: Deformity (kyphosis) present.      Comments: Crepitus knees   Skin:     General: Skin is warm and dry.   Neurological:      Mental Status: He is alert.      Cranial Nerves: No facial asymmetry.      Motor: No tremor.      Gait: Gait abnormal (ambulation with walker).   Psychiatric:         Speech: Speech normal.         Behavior: Behavior normal.         Cognition and Memory: Memory is impaired.           Assessment:       1. Dementia with behavioral disturbance    2. Dementia with behavioral disturbance    3. Hypercholesteremia    4. Primary hypertension    5. Generalized weakness        Plan:       Amilcar was seen today for fatigue, referral and mass.    Diagnoses and all orders for this visit:    Dementia with behavioral disturbance  Comments:  Increase confusion could also be indicative of progression of dementia  Orders:  -     donepeziL (ARICEPT) 10 MG tablet; Take 1 tablet (10 mg total) by mouth every evening.  -     OXcarbazepine (TRILEPTAL) 150 MG Tab; TAKE ONE TABLET BY MOUTH EVERY MORNING and TAKE ONE TABLET BY MOUTH EVERY EVENING    Dementia with behavioral disturbance  -     donepeziL (ARICEPT) 10 MG tablet; Take 1 tablet (10 mg total) by mouth every evening.  -     OXcarbazepine (TRILEPTAL) 150 MG Tab; TAKE ONE TABLET BY MOUTH EVERY MORNING and TAKE ONE TABLET BY MOUTH EVERY EVENING    Hypercholesteremia  -     rosuvastatin (CRESTOR) 10 MG tablet; Take 1 tablet (10 mg total) by mouth once daily.    Primary hypertension  Comments:  Doing fine without meds    Generalized weakness  Comments:  Will have PT go out

## 2023-07-11 NOTE — PROGRESS NOTES
Subjective:       Patient ID: Amilcar Phillip is a 77 y.o. male.    Chief Complaint: Fatigue and Referral (Updated needed for )    Fatigue  Associated symptoms include fatigue. Pertinent negatives include no abdominal pain, arthralgias, chest pain, chills, congestion, coughing, diaphoresis, fever, headaches, joint swelling, myalgias, nausea, neck pain, numbness, rash, sore throat, vomiting or weakness.   Review of Systems   Constitutional:  Positive for fatigue. Negative for activity change, appetite change, chills, diaphoresis, fever and unexpected weight change.   HENT:  Negative for congestion, ear discharge, ear pain, hearing loss, nosebleeds, postnasal drip, rhinorrhea, sinus pressure, sinus pain, sneezing, sore throat, tinnitus, trouble swallowing and voice change.    Eyes:  Negative for photophobia, pain, discharge, redness, itching and visual disturbance.   Respiratory:  Negative for apnea, cough, choking, chest tightness, shortness of breath and wheezing.    Cardiovascular:  Negative for chest pain, palpitations and leg swelling.   Gastrointestinal:  Negative for abdominal distention, abdominal pain, blood in stool, constipation, diarrhea, nausea and vomiting.   Endocrine: Negative for cold intolerance, heat intolerance, polydipsia and polyuria.   Genitourinary:  Negative for decreased urine volume, difficulty urinating, dysuria, enuresis, flank pain, frequency, genital sores, hematuria, penile discharge, penile pain, scrotal swelling, testicular pain and urgency.   Musculoskeletal:  Negative for arthralgias, back pain, gait problem, joint swelling, myalgias, neck pain and neck stiffness.   Skin:  Negative for rash and wound.   Allergic/Immunologic: Negative for environmental allergies, food allergies and immunocompromised state.   Neurological:  Negative for dizziness, tremors, seizures, syncope, facial asymmetry, speech difficulty, weakness, light-headedness, numbness and headaches.    Hematological:  Negative for adenopathy. Does not bruise/bleed easily.   Psychiatric/Behavioral:  Negative for confusion, decreased concentration, hallucinations, self-injury, sleep disturbance and suicidal ideas. The patient is not nervous/anxious.      Past Medical History:   Diagnosis Date    Dementia with behavioral disturbance 2021    Hypercholesteremia     Hypertension     Vitamin D deficiency       Past Surgical History:   Procedure Laterality Date    COLONOSCOPY      HERNIA REPAIR         Family History   Problem Relation Age of Onset    Osteoporosis Mother     Vaginal cancer Mother     Stroke Father        Social History     Socioeconomic History    Marital status:    Occupational History    Occupation: retired   Tobacco Use    Smoking status: Former     Types: Cigarettes, Cigars     Quit date:      Years since quittin.5    Smokeless tobacco: Never   Substance and Sexual Activity    Alcohol use: Yes    Drug use: Never    Sexual activity: Not Currently   Social History Narrative    Live in assistive living center       Current Outpatient Medications   Medication Sig Dispense Refill    acetaminophen (TYLENOL) 325 MG tablet Take 2 tablets (650 mg total) by mouth every 6 (six) hours as needed for Pain.  0    cholecalciferol, vitamin D3, 125 mcg (5,000 unit) capsule TAKE ONE CAPSULE BY MOUTH DAILY 90 capsule 1    donepeziL (ARICEPT) 10 MG tablet TAKE ONE TABLET BY MOUTH EVERY EVENING 90 tablet 1    meloxicam (MOBIC) 15 MG tablet Take 15 mg by mouth once daily.      multivitamin (THERAGRAN) per tablet Take 1 tablet by mouth once daily.      neomycin-polymyxin-hydrocortisone (CORTISPORIN) 3.5-10,000-1 mg/mL-unit/mL-% otic suspension Place 3 drops into both ears 3 (three) times daily. 10 mL 0    OXcarbazepine (TRILEPTAL) 150 MG Tab TAKE ONE TABLET BY MOUTH EVERY MORNING and TAKE ONE TABLET BY MOUTH EVERY EVENING (Patient not taking: Reported on 5/10/2023) 180 tablet 1  "   rOPINIRole (REQUIP) 0.5 MG tablet TAKE ONE TABLET BY MOUTH EVERY EVENING 90 tablet 1    rosuvastatin (CRESTOR) 10 MG tablet TAKE ONE TABLET BY MOUTH EVERY DAY 90 tablet 1    sertraline (ZOLOFT) 50 MG tablet TAKE ONE TABLET BY MOUTH EVERY EVENING 90 tablet 1     No current facility-administered medications for this visit.       Review of patient's allergies indicates:  No Known Allergies  Objective:    HPI     Referral     Additional comments: Updated needed for hh          Last edited by Tomer Sampson MA on 7/11/2023  4:04 PM.      Height 5' 7" (1.702 m). Body mass index is 26.78 kg/m².   Physical Exam        Assessment:       No diagnosis found.    Plan:       There are no diagnoses linked to this encounter.       "

## 2023-07-12 PROCEDURE — G0180 PR HOME HEALTH MD CERTIFICATION: ICD-10-PCS | Mod: ,,, | Performed by: INTERNAL MEDICINE

## 2023-07-12 PROCEDURE — G0180 MD CERTIFICATION HHA PATIENT: HCPCS | Mod: ,,, | Performed by: INTERNAL MEDICINE

## 2023-07-18 ENCOUNTER — EXTERNAL HOME HEALTH (OUTPATIENT)
Dept: HOME HEALTH SERVICES | Facility: HOSPITAL | Age: 78
End: 2023-07-18
Payer: MEDICARE

## 2023-07-31 ENCOUNTER — PATIENT MESSAGE (OUTPATIENT)
Dept: FAMILY MEDICINE | Facility: CLINIC | Age: 78
End: 2023-07-31

## 2023-07-31 DIAGNOSIS — R26.9 GAIT DISTURBANCE: ICD-10-CM

## 2023-07-31 DIAGNOSIS — F03.918 DEMENTIA WITH BEHAVIORAL DISTURBANCE: Primary | ICD-10-CM

## 2023-08-08 ENCOUNTER — OFFICE VISIT (OUTPATIENT)
Dept: ORTHOPEDICS | Facility: CLINIC | Age: 78
End: 2023-08-08
Payer: MEDICARE

## 2023-08-08 VITALS — HEIGHT: 67 IN | WEIGHT: 172 LBS | BODY MASS INDEX: 27 KG/M2

## 2023-08-08 DIAGNOSIS — M17.12 PRIMARY OSTEOARTHRITIS OF LEFT KNEE: ICD-10-CM

## 2023-08-08 DIAGNOSIS — M17.11 PRIMARY OSTEOARTHRITIS OF RIGHT KNEE: Primary | ICD-10-CM

## 2023-08-08 PROCEDURE — 99213 OFFICE O/P EST LOW 20 MIN: CPT | Mod: 25,S$GLB,, | Performed by: ORTHOPAEDIC SURGERY

## 2023-08-08 PROCEDURE — 20610 DRAIN/INJ JOINT/BURSA W/O US: CPT | Mod: RT,S$GLB,, | Performed by: ORTHOPAEDIC SURGERY

## 2023-08-08 PROCEDURE — 99213 PR OFFICE/OUTPT VISIT, EST, LEVL III, 20-29 MIN: ICD-10-PCS | Mod: 25,S$GLB,, | Performed by: ORTHOPAEDIC SURGERY

## 2023-08-08 PROCEDURE — 20610 LARGE JOINT ASPIRATION/INJECTION: R KNEE: ICD-10-PCS | Mod: RT,S$GLB,, | Performed by: ORTHOPAEDIC SURGERY

## 2023-08-08 RX ORDER — METHYLPREDNISOLONE ACETATE 40 MG/ML
40 INJECTION, SUSPENSION INTRA-ARTICULAR; INTRALESIONAL; INTRAMUSCULAR; SOFT TISSUE
Status: DISCONTINUED | OUTPATIENT
Start: 2023-08-08 | End: 2023-08-08 | Stop reason: HOSPADM

## 2023-08-08 RX ADMIN — METHYLPREDNISOLONE ACETATE 40 MG: 40 INJECTION, SUSPENSION INTRA-ARTICULAR; INTRALESIONAL; INTRAMUSCULAR; SOFT TISSUE at 01:08

## 2023-08-08 NOTE — PROGRESS NOTES
[facility]    Subjective:     Chief Complaint:   Chief Complaint   Patient presents with    Left Knee - Pain     BL knee pain follow up. Received inj 05/10/23 which offered relief. Wants to discuss TKA     Right Knee - Pain     BL knee pain follow up. Received inj 05/10/23 which offered relief. Wants to discuss TKA. Right is worse than left          Past Medical History:   Diagnosis Date    Dementia with behavioral disturbance 12/23/2021    Hypercholesteremia     Hypertension     Vitamin D deficiency        Past Surgical History:   Procedure Laterality Date    COLONOSCOPY      HERNIA REPAIR         Current Outpatient Medications   Medication Sig    acetaminophen (TYLENOL) 325 MG tablet Take 2 tablets (650 mg total) by mouth every 6 (six) hours as needed for Pain.    cholecalciferol, vitamin D3, 125 mcg (5,000 unit) capsule TAKE ONE CAPSULE BY MOUTH DAILY    donepeziL (ARICEPT) 10 MG tablet Take 1 tablet (10 mg total) by mouth every evening.    meloxicam (MOBIC) 15 MG tablet Take 15 mg by mouth once daily.    multivitamin (THERAGRAN) per tablet Take 1 tablet by mouth once daily.    OXcarbazepine (TRILEPTAL) 150 MG Tab TAKE ONE TABLET BY MOUTH EVERY MORNING and TAKE ONE TABLET BY MOUTH EVERY EVENING    rOPINIRole (REQUIP) 0.5 MG tablet TAKE ONE TABLET BY MOUTH EVERY EVENING    rosuvastatin (CRESTOR) 10 MG tablet Take 1 tablet (10 mg total) by mouth once daily.    sertraline (ZOLOFT) 50 MG tablet TAKE ONE TABLET BY MOUTH EVERY EVENING     No current facility-administered medications for this visit.       Review of patient's allergies indicates:  No Known Allergies    Family History   Problem Relation Age of Onset    Osteoporosis Mother     Vaginal cancer Mother     Stroke Father        Social History     Socioeconomic History    Marital status:    Occupational History    Occupation: retired   Tobacco Use    Smoking status: Former     Current packs/day: 0.00     Types: Cigarettes, Cigars     Quit  date: 2009     Years since quittin.6    Smokeless tobacco: Never   Substance and Sexual Activity    Alcohol use: Yes    Drug use: Never    Sexual activity: Not Currently   Social History Narrative    Live in assistive living center     Social Determinants of Health     Stress: No Stress Concern Present (2019)    [facility] [place] [facility] [employer] - Occupational Stress Questionnaire     Feeling of Stress : Not at all       History of present illness:  Patient comes in today for the bilateral knees.  The right bothering him today.  The left not really Julia him.  He comes with his wife.  He has dementia.  This will makes history slightly more difficult      Review of Systems:    Constitution: Negative for chills, fever, and sweats.  Negative for unexplained weight loss.    HENT:  Negative for headaches and blurry vision.    Cardiovascular:Negative for chest pain or irregular heart beat. Negative for hypertension.    Respiratory:  Negative for cough and shortness of breath.    Gastrointestinal: Negative for abdominal pain, heartburn, melena, nausea, and vomitting.    Genitourinary:  Negative bladder incontinence and dysuria.    Musculoskeletal:  See HPI for details.     Neurological: Negative for numbness.    Psychiatric/Behavioral: Negative for depression.  The patient is not nervous/anxious.      Endocrine: Negative for polyuria    Hematologic/Lymphatic: Negative for bleeding problem.  Does not bruise/bleed easily.    Skin: Negative for poor would healing and rash    Objective:      Physical Examination:    Vital Signs:  There were no vitals filed for this visit.    Body mass index is 26.94 kg/m².    This a well-developed, well nourished patient in no acute distress.  They are alert and oriented and cooperative to examination.        Patient has bilateral range of motion 5-95 degrees.  He does have crepitus.  Pertinent New Results:    XRAY Report / Interpretation:       Assessment/Plan:       Osteoarthritis bilateral knees I injected the right knee with Depo-Medrol and lidocaine follow-up p.r.n.      This note was created using Dragon voice recognition software that occasionally misinterpreted phrases or words.

## 2023-08-08 NOTE — PROCEDURES
Large Joint Aspiration/Injection: R knee    Date/Time: 8/8/2023 1:15 PM    Performed by: Lionel Rodriguez MD  Authorized by: Lionel Rodriguez MD    Consent Done?:  Yes (Verbal)  Indications:  Arthritis and pain  Site marked: the procedure site was marked    Timeout: prior to procedure the correct patient, procedure, and site was verified    Prep: patient was prepped and draped in usual sterile fashion      Local anesthesia used?: Yes    Local anesthetic:  Lidocaine 1% without epinephrine  Ultrasonic Guidance for needle placement?: No    Location:  Knee  Site:  R knee  Medications:  40 mg methylPREDNISolone acetate 40 mg/mL  Patient tolerance:  Patient tolerated the procedure well with no immediate complications

## 2023-09-10 PROCEDURE — G0179 PR HOME HEALTH MD RECERTIFICATION: ICD-10-PCS | Mod: ,,, | Performed by: INTERNAL MEDICINE

## 2023-09-10 PROCEDURE — G0179 MD RECERTIFICATION HHA PT: HCPCS | Mod: ,,, | Performed by: INTERNAL MEDICINE

## 2023-09-11 PROBLEM — G23.1: Status: ACTIVE | Noted: 2023-09-11

## 2023-09-18 ENCOUNTER — EXTERNAL HOME HEALTH (OUTPATIENT)
Dept: HOME HEALTH SERVICES | Facility: HOSPITAL | Age: 78
End: 2023-09-18
Payer: MEDICARE

## 2023-10-31 ENCOUNTER — HOSPITAL ENCOUNTER (INPATIENT)
Facility: HOSPITAL | Age: 78
LOS: 2 days | Discharge: HOME HEALTH | DRG: 312 | End: 2023-11-03
Attending: EMERGENCY MEDICINE | Admitting: STUDENT IN AN ORGANIZED HEALTH CARE EDUCATION/TRAINING PROGRAM
Payer: MEDICARE

## 2023-10-31 DIAGNOSIS — R55 NEAR SYNCOPE: ICD-10-CM

## 2023-10-31 DIAGNOSIS — R00.1 BRADYCARDIA: ICD-10-CM

## 2023-10-31 LAB
ALBUMIN SERPL BCP-MCNC: 3.9 G/DL (ref 3.5–5.2)
ALP SERPL-CCNC: 79 U/L (ref 55–135)
ALT SERPL W/O P-5'-P-CCNC: 3 U/L (ref 10–44)
ANION GAP SERPL CALC-SCNC: 4 MMOL/L (ref 8–16)
AST SERPL-CCNC: 14 U/L (ref 10–40)
BACTERIA #/AREA URNS HPF: NEGATIVE /HPF
BASOPHILS # BLD AUTO: 0.02 K/UL (ref 0–0.2)
BASOPHILS NFR BLD: 0.2 % (ref 0–1.9)
BILIRUB SERPL-MCNC: 0.5 MG/DL (ref 0.1–1)
BILIRUB UR QL STRIP: NEGATIVE
BUN SERPL-MCNC: 15 MG/DL (ref 8–23)
CALCIUM SERPL-MCNC: 8.4 MG/DL (ref 8.7–10.5)
CHLORIDE SERPL-SCNC: 108 MMOL/L (ref 95–110)
CLARITY UR: CLEAR
CO2 SERPL-SCNC: 28 MMOL/L (ref 23–29)
COLOR UR: YELLOW
CREAT SERPL-MCNC: 0.9 MG/DL (ref 0.5–1.4)
DIFFERENTIAL METHOD: ABNORMAL
EOSINOPHIL # BLD AUTO: 0.2 K/UL (ref 0–0.5)
EOSINOPHIL NFR BLD: 1.5 % (ref 0–8)
ERYTHROCYTE [DISTWIDTH] IN BLOOD BY AUTOMATED COUNT: 13.7 % (ref 11.5–14.5)
EST. GFR  (NO RACE VARIABLE): >60 ML/MIN/1.73 M^2
GLUCOSE SERPL-MCNC: 109 MG/DL (ref 70–110)
GLUCOSE UR QL STRIP: NEGATIVE
HCT VFR BLD AUTO: 41.5 % (ref 40–54)
HGB BLD-MCNC: 13.5 G/DL (ref 14–18)
HGB UR QL STRIP: NEGATIVE
HYALINE CASTS #/AREA URNS LPF: 34 /LPF
IMM GRANULOCYTES # BLD AUTO: 0.06 K/UL (ref 0–0.04)
IMM GRANULOCYTES NFR BLD AUTO: 0.6 % (ref 0–0.5)
KETONES UR QL STRIP: NEGATIVE
LEUKOCYTE ESTERASE UR QL STRIP: NEGATIVE
LYMPHOCYTES # BLD AUTO: 1.2 K/UL (ref 1–4.8)
LYMPHOCYTES NFR BLD: 11.5 % (ref 18–48)
MAGNESIUM SERPL-MCNC: 1.9 MG/DL (ref 1.6–2.6)
MCH RBC QN AUTO: 30.8 PG (ref 27–31)
MCHC RBC AUTO-ENTMCNC: 32.5 G/DL (ref 32–36)
MCV RBC AUTO: 95 FL (ref 82–98)
MICROSCOPIC COMMENT: ABNORMAL
MONOCYTES # BLD AUTO: 0.8 K/UL (ref 0.3–1)
MONOCYTES NFR BLD: 7.5 % (ref 4–15)
NEUTROPHILS # BLD AUTO: 8 K/UL (ref 1.8–7.7)
NEUTROPHILS NFR BLD: 78.7 % (ref 38–73)
NITRITE UR QL STRIP: NEGATIVE
NRBC BLD-RTO: 0 /100 WBC
PH UR STRIP: 7 [PH] (ref 5–8)
PLATELET # BLD AUTO: 253 K/UL (ref 150–450)
PMV BLD AUTO: 10 FL (ref 9.2–12.9)
POTASSIUM SERPL-SCNC: 3.6 MMOL/L (ref 3.5–5.1)
PROT SERPL-MCNC: 6.6 G/DL (ref 6–8.4)
PROT UR QL STRIP: ABNORMAL
RBC # BLD AUTO: 4.38 M/UL (ref 4.6–6.2)
RBC #/AREA URNS HPF: 4 /HPF (ref 0–4)
SODIUM SERPL-SCNC: 140 MMOL/L (ref 136–145)
SP GR UR STRIP: 1.02 (ref 1–1.03)
SQUAMOUS #/AREA URNS HPF: 3 /HPF
TROPONIN I SERPL HS-MCNC: 3 PG/ML (ref 0–14.9)
TSH SERPL DL<=0.005 MIU/L-ACNC: 1.53 UIU/ML (ref 0.34–5.6)
URN SPEC COLLECT METH UR: ABNORMAL
UROBILINOGEN UR STRIP-ACNC: NEGATIVE EU/DL
WBC # BLD AUTO: 10.1 K/UL (ref 3.9–12.7)
WBC #/AREA URNS HPF: 1 /HPF (ref 0–5)

## 2023-10-31 PROCEDURE — 93010 EKG 12-LEAD: ICD-10-PCS | Mod: ,,, | Performed by: GENERAL PRACTICE

## 2023-10-31 PROCEDURE — 84484 ASSAY OF TROPONIN QUANT: CPT | Performed by: EMERGENCY MEDICINE

## 2023-10-31 PROCEDURE — G0378 HOSPITAL OBSERVATION PER HR: HCPCS

## 2023-10-31 PROCEDURE — 99285 EMERGENCY DEPT VISIT HI MDM: CPT | Mod: 25

## 2023-10-31 PROCEDURE — 93010 ELECTROCARDIOGRAM REPORT: CPT | Mod: ,,, | Performed by: GENERAL PRACTICE

## 2023-10-31 PROCEDURE — 93005 ELECTROCARDIOGRAM TRACING: CPT | Performed by: GENERAL PRACTICE

## 2023-10-31 PROCEDURE — 63600175 PHARM REV CODE 636 W HCPCS: Performed by: STUDENT IN AN ORGANIZED HEALTH CARE EDUCATION/TRAINING PROGRAM

## 2023-10-31 PROCEDURE — 96372 THER/PROPH/DIAG INJ SC/IM: CPT | Performed by: STUDENT IN AN ORGANIZED HEALTH CARE EDUCATION/TRAINING PROGRAM

## 2023-10-31 PROCEDURE — 84443 ASSAY THYROID STIM HORMONE: CPT | Performed by: EMERGENCY MEDICINE

## 2023-10-31 PROCEDURE — 81001 URINALYSIS AUTO W/SCOPE: CPT | Performed by: EMERGENCY MEDICINE

## 2023-10-31 PROCEDURE — 80053 COMPREHEN METABOLIC PANEL: CPT | Performed by: EMERGENCY MEDICINE

## 2023-10-31 PROCEDURE — 25000003 PHARM REV CODE 250: Performed by: STUDENT IN AN ORGANIZED HEALTH CARE EDUCATION/TRAINING PROGRAM

## 2023-10-31 PROCEDURE — 83735 ASSAY OF MAGNESIUM: CPT | Performed by: EMERGENCY MEDICINE

## 2023-10-31 PROCEDURE — 85025 COMPLETE CBC W/AUTO DIFF WBC: CPT | Performed by: EMERGENCY MEDICINE

## 2023-10-31 PROCEDURE — 94761 N-INVAS EAR/PLS OXIMETRY MLT: CPT

## 2023-10-31 RX ORDER — CARBIDOPA AND LEVODOPA 25; 100 MG/1; MG/1
12 TABLET ORAL SEE ADMIN INSTRUCTIONS
COMMUNITY
Start: 2023-10-23

## 2023-10-31 RX ORDER — SERTRALINE HYDROCHLORIDE 50 MG/1
50 TABLET, FILM COATED ORAL NIGHTLY
Status: DISCONTINUED | OUTPATIENT
Start: 2023-10-31 | End: 2023-11-03 | Stop reason: HOSPADM

## 2023-10-31 RX ORDER — ROPINIROLE 0.25 MG/1
0.5 TABLET, FILM COATED ORAL NIGHTLY
Status: DISCONTINUED | OUTPATIENT
Start: 2023-10-31 | End: 2023-11-03 | Stop reason: HOSPADM

## 2023-10-31 RX ORDER — DONEPEZIL HYDROCHLORIDE 5 MG/1
10 TABLET, FILM COATED ORAL NIGHTLY
Status: DISCONTINUED | OUTPATIENT
Start: 2023-10-31 | End: 2023-11-02

## 2023-10-31 RX ORDER — ENOXAPARIN SODIUM 100 MG/ML
40 INJECTION SUBCUTANEOUS EVERY 24 HOURS
Status: DISCONTINUED | OUTPATIENT
Start: 2023-10-31 | End: 2023-11-03 | Stop reason: HOSPADM

## 2023-10-31 RX ORDER — ONDANSETRON 2 MG/ML
4 INJECTION INTRAMUSCULAR; INTRAVENOUS EVERY 8 HOURS PRN
Status: DISCONTINUED | OUTPATIENT
Start: 2023-10-31 | End: 2023-11-03 | Stop reason: HOSPADM

## 2023-10-31 RX ORDER — SODIUM CHLORIDE 0.9 % (FLUSH) 0.9 %
10 SYRINGE (ML) INJECTION
Status: DISCONTINUED | OUTPATIENT
Start: 2023-10-31 | End: 2023-11-03 | Stop reason: HOSPADM

## 2023-10-31 RX ORDER — AMOXICILLIN 250 MG
1 CAPSULE ORAL 2 TIMES DAILY
Status: DISCONTINUED | OUTPATIENT
Start: 2023-10-31 | End: 2023-11-03 | Stop reason: HOSPADM

## 2023-10-31 RX ORDER — ACETAMINOPHEN 325 MG/1
650 TABLET ORAL EVERY 8 HOURS PRN
Status: DISCONTINUED | OUTPATIENT
Start: 2023-10-31 | End: 2023-11-03 | Stop reason: HOSPADM

## 2023-10-31 RX ORDER — TALC
6 POWDER (GRAM) TOPICAL NIGHTLY PRN
Status: DISCONTINUED | OUTPATIENT
Start: 2023-10-31 | End: 2023-11-03 | Stop reason: HOSPADM

## 2023-10-31 RX ORDER — ATORVASTATIN CALCIUM 40 MG/1
40 TABLET, FILM COATED ORAL DAILY
Status: DISCONTINUED | OUTPATIENT
Start: 2023-10-31 | End: 2023-11-03 | Stop reason: HOSPADM

## 2023-10-31 RX ADMIN — SERTRALINE HYDROCHLORIDE 50 MG: 50 TABLET ORAL at 08:10

## 2023-10-31 RX ADMIN — ATORVASTATIN CALCIUM 40 MG: 40 TABLET, FILM COATED ORAL at 08:10

## 2023-10-31 RX ADMIN — DONEPEZIL HYDROCHLORIDE 10 MG: 5 TABLET, FILM COATED ORAL at 08:10

## 2023-10-31 RX ADMIN — SENNOSIDES AND DOCUSATE SODIUM 1 TABLET: 50; 8.6 TABLET ORAL at 08:10

## 2023-10-31 RX ADMIN — ROPINIROLE HYDROCHLORIDE 0.5 MG: 0.25 TABLET, FILM COATED ORAL at 08:10

## 2023-10-31 RX ADMIN — ENOXAPARIN SODIUM 40 MG: 40 INJECTION SUBCUTANEOUS at 05:10

## 2023-10-31 NOTE — H&P
Patient Name: Amilcar Phillip  MRN: [id number]  Patient Class: OP- Observation   Admission Date: 10/31/2023  9:21 AM  Attending Physician: Benji Pederson MD  Primary Care Provider: Angel Edwards Jr., MD  Face-to-Face encounter date: 10/31/2023  Code Status: Full  MPOA:  Chief Complaint: Loss of Consciousness (Unwitnessed fall coming from [facility]. Pt states he passed out.)        HISTORY OF PRESENT ILLNESS:     Amilcar Phillip is a 78 y.o.  male with known history of HTN, dementia, vit d deficiency  who presents after an episode of syncope of near syncope.  He has a history of dementia so most of his history is from his caretaker who is present in the ER.  She states he mentioned feeling dizzy, then while seated at a table he had an episode where he seemed to almost lose consciousness, with his eyes rolling back for a brief period of less than a minute.  She also notes that he looked vary pale and diaphoretic, he also had associated shortness of breath.  She is not aware of any similar episodes in the past, although there have been previous admissions where he was found down at home.  He lives in an assisted living facility.  His only med change was his PCP stopping an unknown blood pressure medication recently due to his bp being controlled or low while taking it.  On arrival to ER his HR is 50.  No beta blockers or other rate controlling meds on home med list.  Patient denies chest pain, palpitations, abdominal pain, nausea, vomiting, diarrhea, hematochezia, hematemesis, fever, cough, congestion, runny nose, hearing, numbness, tingling, headache, focal weakness.  History was obtained from the  caregiver present at the bedside and ER physician Sign-out.     REVIEW OF SYSTEMS:     10 Point Review of System was performed and was found to be negative except for that mentioned already in the HPI above.     PAST MEDICAL HISTORY:     Past Medical History:   Diagnosis Date    Dementia with  behavioral disturbance 2021    Hypercholesteremia     Hypertension     Progressive supranuclear ophthalmoplegia     Vitamin D deficiency        PAST SURGICAL HISTORY:     Past Surgical History:   Procedure Laterality Date    COLONOSCOPY      HERNIA REPAIR         ALLERGIES:   Patient has no known allergies.    FAMILY HISTORY:     Family History   Problem Relation Age of Onset    Osteoporosis Mother     Vaginal cancer Mother     Stroke Father        SOCIAL HISTORY:     Social History     Tobacco Use    Smoking status: Former     Current packs/day: 0.00     Types: Cigarettes, Cigars     Quit date:      Years since quittin.8    Smokeless tobacco: Never   Substance Use Topics    Alcohol use: Yes        Social History     Substance and Sexual Activity   Sexual Activity Not Currently        HOME MEDICATIONS:     Prior to Admission medications    Medication Sig Start Date End Date Taking? Authorizing Provider   acetaminophen (TYLENOL) 325 MG tablet Take 2 tablets (650 mg total) by mouth every 6 (six) hours as needed for Pain. 22   Partha Haider MD   carbidopa-levodopa  mg (SINEMET)  mg per tablet Take 12 tablets by mouth As instructed. Take 2 tablets in the morning, 1 tablet at lunch and 1 at bedtime 10/23/23   Provider, Historical   cholecalciferol, vitamin D3, 125 mcg (5,000 unit) capsule TAKE ONE CAPSULE BY MOUTH DAILY 23   Angel Edwards Jr., MD   donepeziL (ARICEPT) 10 MG tablet Take 1 tablet (10 mg total) by mouth every evening. 23   Angel Edwards Jr., MD   meloxicam (MOBIC) 15 MG tablet Take 15 mg by mouth once daily.    Provider, Historical   multivitamin (THERAGRAN) per tablet Take 1 tablet by mouth once daily. 22   Provider, Historical   OXcarbazepine (TRILEPTAL) 150 MG Tab TAKE ONE TABLET BY MOUTH EVERY MORNING and TAKE ONE TABLET BY MOUTH EVERY EVENING 23   Angel Edwards Jr., MD   rOPINIRole (REQUIP) 0.5 MG tablet TAKE ONE TABLET BY MOUTH  "EVERY EVENING 6/16/23   Angel Edwards Jr., MD   rosuvastatin (CRESTOR) 10 MG tablet Take 1 tablet (10 mg total) by mouth once daily. 7/11/23   Angel Edwards Jr., MD   sertraline (ZOLOFT) 50 MG tablet TAKE ONE TABLET BY MOUTH EVERY EVENING 6/16/23   Angel Edwards Jr., MD         PHYSICAL EXAM:     BP (!) 157/78 Comment: notified nurse danie  Pulse 74   Temp 98.4 °F (36.9 °C) (Oral)   Resp 18   Ht 5' 7" (1.702 m)   Wt 84.8 kg (186 lb 15.2 oz)   SpO2 99%   BMI 29.28 kg/m²   Vitals Reviewed    General appearance: no apparent distress.  Neuro: oriented to self, and hospital. Motor and sensory exams grossly intact. Good tone.   HENT: Atraumatic head.    Lungs: Clear to auscultation bilaterally. No wheezing present.   Heart: rock in 50s. S1 and S2 present   Abdomen: Soft, non-distended, non-tender. No rebound tenderness/guarding.   Extremities: No cyanosis, clubbing.  Psych/mental status: Alert and oriented x2. Cooperative.       EMERGENCY DEPARTMENT LABS AND IMAGING:     Labs Reviewed   CBC W/ AUTO DIFFERENTIAL - Abnormal; Notable for the following components:       Result Value    RBC 4.38 (*)     Hemoglobin 13.5 (*)     Immature Granulocytes 0.6 (*)     Gran # (ANC) 8.0 (*)     Immature Grans (Abs) 0.06 (*)     Gran % 78.7 (*)     Lymph % 11.5 (*)     All other components within normal limits   COMPREHENSIVE METABOLIC PANEL - Abnormal; Notable for the following components:    Calcium 8.4 (*)     ALT 3 (*)     Anion Gap 4 (*)     All other components within normal limits   URINALYSIS, REFLEX TO URINE CULTURE - Abnormal; Notable for the following components:    Protein, UA 1+ (*)     All other components within normal limits    Narrative:     Specimen Source->Urine   URINALYSIS MICROSCOPIC - Abnormal; Notable for the following components:    Hyaline Casts, UA 34 (*)     All other components within normal limits    Narrative:     Specimen Source->Urine   MAGNESIUM   TROPONIN I HIGH SENSITIVITY "   TSH       CT Head Without Contrast   Final Result      X-Ray Chest AP Portable   Final Result          ASSESSMENT & PLAN:     Amilcar Phillip is a 78 y.o. male admitted for near syncope    Active Hospital Problems    Diagnosis    *Syncope        Plan    Near Syncope  Bradycardia  EKG sinus rock, trop negative  BP wnl  HR eventually increased to 70s   Echo about a year ago with good EF, only mild AS  No meds to cause bradycardia  UA no infection, 34 hyaline casts possibly indicating hypotension  Admit to obs  Cardiac monitor  Check orthostatic vitals  TSH  Head CT negative  Further w/u pending course  May need a holter or loop recorder    Dementia  Vitamin D deficiency  Resume home meds    Diet: Regular    DVT Prophylaxis: low molecular weight heparin and Encourage ambulation. OOB as tolerated.     Discharge Planning and Disposition:   assisted living facility    Expected LOS: 1-2      ________________________________________________________________________________    INPATIENT LIST OF MEDICATIONS     Current Facility-Administered Medications:     acetaminophen tablet 650 mg, 650 mg, Oral, Q8H PRN, Benji Pederson MD    acetaminophen tablet 650 mg, 650 mg, Oral, Q8H PRN, Benji Pederson MD    atorvastatin tablet 40 mg, 40 mg, Oral, Daily, Benji Pederson MD    [START ON 11/1/2023] cholecalciferol (vitamin D3) capsule/tablet 4,800 Units, 4,800 Units, Oral, Daily, Benji Pederson MD    donepeziL tablet 10 mg, 10 mg, Oral, QHS, Benji Pederson MD    enoxaparin injection 40 mg, 40 mg, Subcutaneous, Daily, Benji Pederson MD, 40 mg at 10/31/23 1749    melatonin tablet 6 mg, 6 mg, Oral, Nightly PRN, Benji Pederson MD    ondansetron injection 4 mg, 4 mg, Intravenous, Q8H PRN, Benji Pederson MD    rOPINIRole tablet 0.5 mg, 0.5 mg, Oral, QHS, Benji Pederson MD    senna-docusate 8.6-50 mg per tablet 1 tablet, 1 tablet, Oral, BID,  Benji Pederson MD    sertraline tablet 50 mg, 50 mg, Oral, QHS, Benji Pederson MD    sodium chloride 0.9% flush 10 mL, 10 mL, Intravenous, PRN, Benji Pederson MD      Scheduled Meds:   atorvastatin  40 mg Oral Daily    [START ON 11/1/2023] cholecalciferol (vitamin D3)  4,800 Units Oral Daily    donepeziL  10 mg Oral QHS    enoxparin  40 mg Subcutaneous Daily    rOPINIRole  0.5 mg Oral QHS    senna-docusate 8.6-50 mg  1 tablet Oral BID    sertraline  50 mg Oral QHS     Continuous Infusions:  PRN Meds:.acetaminophen, acetaminophen, melatonin, ondansetron, sodium chloride 0.9%      Benji Pederson  [facility] Hospitalist  10/31/2023

## 2023-10-31 NOTE — ED PROVIDER NOTES
Encounter Date: 10/31/2023       History     Chief Complaint   Patient presents with    Loss of Consciousness     Unwitnessed fall coming from [facility]. Pt states he passed out.     78-year-old male who lives at [facility] is brought in by his attendant with a history that he was seated at the breakfast table when his eyes rolled back in the complain of shortness of breath and dizziness.  The episode was relatively brief.  He did not have a complete loss of consciousness.  No history of seizures.  He is had a prior history of dementia, hypertension, hypercholesterolemia, progressive supranuclear ophthalmoplegia and a vitamin-D deficiency.  There has been no changes acutely in his medications.  He is had no complaints of any palpitations.  He denied any specific complaints of pain.  He did complain of some visual blurring.  No incontinence.  No history of any nausea vomiting or diarrhea.  Does not smoke or drink.      Review of patient's allergies indicates:  No Known Allergies  Past Medical History:   Diagnosis Date    Dementia with behavioral disturbance 2021    Hypercholesteremia     Hypertension     Progressive supranuclear ophthalmoplegia     Vitamin D deficiency      Past Surgical History:   Procedure Laterality Date    COLONOSCOPY      HERNIA REPAIR       Family History   Problem Relation Age of Onset    Osteoporosis Mother     Vaginal cancer Mother     Stroke Father      Social History     Tobacco Use    Smoking status: Former     Current packs/day: 0.00     Types: Cigarettes, Cigars     Quit date:      Years since quittin.8    Smokeless tobacco: Never   Substance Use Topics    Alcohol use: Yes    Drug use: Never     Review of Systems   Constitutional:  Negative for activity change, appetite change, chills, diaphoresis and fever.   HENT:  Negative for congestion, ear pain, rhinorrhea, sinus pain, sore throat and trouble swallowing.    Eyes:  Positive for visual disturbance. Negative  for pain.   Respiratory:  Negative for cough, shortness of breath and wheezing.    Cardiovascular:  Negative for chest pain and palpitations.   Gastrointestinal:  Negative for abdominal pain, blood in stool, constipation, diarrhea, nausea and vomiting.   Genitourinary:  Negative for difficulty urinating, flank pain, frequency and hematuria.   Musculoskeletal:  Negative for myalgias and neck pain.   Skin:  Negative for color change, pallor and rash.   Neurological:  Positive for dizziness. Negative for seizures, facial asymmetry, light-headedness, numbness and headaches.   Hematological:  Does not bruise/bleed easily.   Psychiatric/Behavioral:  Negative for confusion.    All other systems reviewed and are negative.      Physical Exam     Initial Vitals   BP Pulse Resp Temp SpO2   10/31/23 0924 10/31/23 0924 10/31/23 0924 10/31/23 0924 10/31/23 0925   134/70 (!) 50 20 98 °F (36.7 °C) 96 %      MAP       --                Physical Exam    Vitals reviewed.  Constitutional: He appears well-developed and well-nourished. He is not diaphoretic. No distress.   Alert, cooperative   HENT:   Head: Normocephalic and atraumatic.   Right Ear: External ear normal.   Left Ear: External ear normal.   Nose: Nose normal.   Mouth/Throat: Oropharynx is clear and moist.   Eyes: Conjunctivae and EOM are normal. Pupils are equal, round, and reactive to light.   Neck: Neck supple. No JVD present.   Normal range of motion.  Cardiovascular:  Normal rate, regular rhythm, normal heart sounds and intact distal pulses.     Exam reveals no gallop and no friction rub.       No murmur heard.  Pulmonary/Chest: Breath sounds normal. No respiratory distress. He has no wheezes. He has no rhonchi. He has no rales. He exhibits no tenderness.   Abdominal: Abdomen is soft. Bowel sounds are normal. He exhibits no distension. There is no abdominal tenderness. There is no rebound and no guarding.   Musculoskeletal:         General: No tenderness or edema.  Normal range of motion.      Cervical back: Normal range of motion and neck supple.     Lymphadenopathy:     He has no cervical adenopathy.   Neurological: He is alert and oriented to person, place, and time. He has normal strength. No cranial nerve deficit or sensory deficit. GCS score is 15. GCS eye subscore is 4. GCS verbal subscore is 5. GCS motor subscore is 6.   Skin: Skin is warm and dry. Capillary refill takes less than 2 seconds. No rash noted. No erythema. No pallor.   Psychiatric: He has a normal mood and affect. His behavior is normal. Judgment and thought content normal.         ED Course   Procedures  Labs Reviewed   CBC W/ AUTO DIFFERENTIAL - Abnormal; Notable for the following components:       Result Value    RBC 4.38 (*)     Hemoglobin 13.5 (*)     Immature Granulocytes 0.6 (*)     Gran # (ANC) 8.0 (*)     Immature Grans (Abs) 0.06 (*)     Gran % 78.7 (*)     Lymph % 11.5 (*)     All other components within normal limits   COMPREHENSIVE METABOLIC PANEL - Abnormal; Notable for the following components:    Calcium 8.4 (*)     ALT 3 (*)     Anion Gap 4 (*)     All other components within normal limits   URINALYSIS, REFLEX TO URINE CULTURE - Abnormal; Notable for the following components:    Protein, UA 1+ (*)     All other components within normal limits    Narrative:     Specimen Source->Urine   URINALYSIS MICROSCOPIC - Abnormal; Notable for the following components:    Hyaline Casts, UA 34 (*)     All other components within normal limits    Narrative:     Specimen Source->Urine   MAGNESIUM   TROPONIN I HIGH SENSITIVITY   TSH        ECG Results              EKG 12-lead (In process)  Result time 10/31/23 11:39:11      In process by Interface, Lab In [facility] (10/31/23 11:39:11)                   Narrative:    Test Reason : R55,    Vent. Rate : 053 BPM     Atrial Rate : 053 BPM     P-R Int : 184 ms          QRS Dur : 094 ms      QT Int : 468 ms       P-R-T Axes : 029 -18 038 degrees     QTc Int : 439  ms    Sinus bradycardia  Cannot rule out Anterior infarct ,age undetermined  Abnormal ECG  When compared with ECG of 07-JUL-2022 13:23,  Nonspecific T wave abnormality no longer evident in Inferior leads    Referred By: [email or web address]   SELF           Confirmed By:                                   Imaging Results              X-Ray Chest AP Portable (Final result)  Result time 10/31/23 11:51:31      Final result by Savannah Arango MD (10/31/23 11:51:31)                   Narrative:    XR CHEST 1 VIEW    CLINICAL HISTORY:  78 years Male Near syncope    COMPARISON: 7/7/2022    FINDINGS: Cardiomediastinal silhouette is within normal limits. Lungs are normally expanded with no airspace consolidation. No pleural effusion or pneumothorax. No acute osseous abnormality.    IMPRESSION: No acute pulmonary process.    Electronically signed by:  Savannah Arango MD  10/31/2023 11:51 AM CDT Workstation: [id number]                                     Medications - No data to display  Medical Decision Making  Amount and/or Complexity of Data Reviewed  Labs: ordered.  Radiology: ordered.              Attending Attestation:             Attending ED Notes:   This 78-year-old male brought from [place] had a near syncopal episode.  The patient is neurologically nonfocal.  He does in the history of dementia but is answering all questions appropriately.  Screening labs obtained were reviewed.  EKG showed a sinus bradycardia with an anterior scar but no other ischemia ectopy.  Chest x-ray is negative.  Hospital Medicine will be consulted for a observation med tele admission.    Differential diagnosis includes near-syncope due to a transient neurologic deficit, hypotension transiently, cardiac arrhythmia.                      Clinical Impression:   Final diagnoses:  [R55] Near syncope        ED Disposition Condition    Observation Stable                Vinicius Stephens Jr., MD  10/31/23 1235

## 2023-11-01 LAB
ALBUMIN SERPL BCP-MCNC: 4 G/DL (ref 3.5–5.2)
ALP SERPL-CCNC: 82 U/L (ref 55–135)
ALT SERPL W/O P-5'-P-CCNC: 18 U/L (ref 10–44)
ANION GAP SERPL CALC-SCNC: 6 MMOL/L (ref 8–16)
AST SERPL-CCNC: 13 U/L (ref 10–40)
BASOPHILS # BLD AUTO: 0.03 K/UL (ref 0–0.2)
BASOPHILS NFR BLD: 0.3 % (ref 0–1.9)
BILIRUB SERPL-MCNC: 0.6 MG/DL (ref 0.1–1)
BUN SERPL-MCNC: 18 MG/DL (ref 8–23)
CALCIUM SERPL-MCNC: 9 MG/DL (ref 8.7–10.5)
CHLORIDE SERPL-SCNC: 105 MMOL/L (ref 95–110)
CO2 SERPL-SCNC: 28 MMOL/L (ref 23–29)
CREAT SERPL-MCNC: 0.9 MG/DL (ref 0.5–1.4)
DIFFERENTIAL METHOD: ABNORMAL
EOSINOPHIL # BLD AUTO: 0.2 K/UL (ref 0–0.5)
EOSINOPHIL NFR BLD: 2 % (ref 0–8)
ERYTHROCYTE [DISTWIDTH] IN BLOOD BY AUTOMATED COUNT: 13.6 % (ref 11.5–14.5)
EST. GFR  (NO RACE VARIABLE): >60 ML/MIN/1.73 M^2
GLUCOSE SERPL-MCNC: 89 MG/DL (ref 70–110)
HCT VFR BLD AUTO: 41.8 % (ref 40–54)
HGB BLD-MCNC: 13.9 G/DL (ref 14–18)
IMM GRANULOCYTES # BLD AUTO: 0.04 K/UL (ref 0–0.04)
IMM GRANULOCYTES NFR BLD AUTO: 0.3 % (ref 0–0.5)
LYMPHOCYTES # BLD AUTO: 1.9 K/UL (ref 1–4.8)
LYMPHOCYTES NFR BLD: 16.9 % (ref 18–48)
MCH RBC QN AUTO: 30.5 PG (ref 27–31)
MCHC RBC AUTO-ENTMCNC: 33.3 G/DL (ref 32–36)
MCV RBC AUTO: 92 FL (ref 82–98)
MONOCYTES # BLD AUTO: 1.1 K/UL (ref 0.3–1)
MONOCYTES NFR BLD: 9.6 % (ref 4–15)
NEUTROPHILS # BLD AUTO: 8.1 K/UL (ref 1.8–7.7)
NEUTROPHILS NFR BLD: 70.9 % (ref 38–73)
NRBC BLD-RTO: 0 /100 WBC
PLATELET # BLD AUTO: 285 K/UL (ref 150–450)
PMV BLD AUTO: 10.4 FL (ref 9.2–12.9)
POTASSIUM SERPL-SCNC: 3.5 MMOL/L (ref 3.5–5.1)
PROT SERPL-MCNC: 6.9 G/DL (ref 6–8.4)
RBC # BLD AUTO: 4.55 M/UL (ref 4.6–6.2)
SODIUM SERPL-SCNC: 139 MMOL/L (ref 136–145)
WBC # BLD AUTO: 11.46 K/UL (ref 3.9–12.7)

## 2023-11-01 PROCEDURE — 36415 COLL VENOUS BLD VENIPUNCTURE: CPT | Performed by: STUDENT IN AN ORGANIZED HEALTH CARE EDUCATION/TRAINING PROGRAM

## 2023-11-01 PROCEDURE — 12000002 HC ACUTE/MED SURGE SEMI-PRIVATE ROOM

## 2023-11-01 PROCEDURE — 25000003 PHARM REV CODE 250: Performed by: STUDENT IN AN ORGANIZED HEALTH CARE EDUCATION/TRAINING PROGRAM

## 2023-11-01 PROCEDURE — 63600175 PHARM REV CODE 636 W HCPCS: Performed by: STUDENT IN AN ORGANIZED HEALTH CARE EDUCATION/TRAINING PROGRAM

## 2023-11-01 PROCEDURE — 80053 COMPREHEN METABOLIC PANEL: CPT | Performed by: STUDENT IN AN ORGANIZED HEALTH CARE EDUCATION/TRAINING PROGRAM

## 2023-11-01 PROCEDURE — 85025 COMPLETE CBC W/AUTO DIFF WBC: CPT | Performed by: STUDENT IN AN ORGANIZED HEALTH CARE EDUCATION/TRAINING PROGRAM

## 2023-11-01 RX ADMIN — CHOLECALCIFEROL (VITAMIN D3) 10 MCG (400 UNIT) TABLET 4800 UNITS: at 09:11

## 2023-11-01 RX ADMIN — ENOXAPARIN SODIUM 40 MG: 40 INJECTION SUBCUTANEOUS at 06:11

## 2023-11-01 RX ADMIN — DONEPEZIL HYDROCHLORIDE 10 MG: 5 TABLET, FILM COATED ORAL at 09:11

## 2023-11-01 RX ADMIN — SENNOSIDES AND DOCUSATE SODIUM 1 TABLET: 50; 8.6 TABLET ORAL at 09:11

## 2023-11-01 RX ADMIN — ATORVASTATIN CALCIUM 40 MG: 40 TABLET, FILM COATED ORAL at 09:11

## 2023-11-01 RX ADMIN — ROPINIROLE HYDROCHLORIDE 0.5 MG: 0.25 TABLET, FILM COATED ORAL at 09:11

## 2023-11-01 RX ADMIN — SERTRALINE HYDROCHLORIDE 50 MG: 50 TABLET ORAL at 09:11

## 2023-11-01 NOTE — PLAN OF CARE
Pt's caregiver was explained EDOUARD. Pt's caregiver verbalized understanding of EDOUARD and signed. EDOUARD scanned to .       11/01/23 1212   EDOUARD Message   Medicare Outpatient and Observation Notification regarding financial responsibility Given to patient/caregiver;Explained to patient/caregiver;Signed/date by patient/caregiver   Date EDOUARD was signed 11/01/23   Time EDOUARD was signed 1212

## 2023-11-01 NOTE — PLAN OF CARE
[facility]  Initial Discharge Assessment       Primary Care Provider: Angel Edwards Jr., MD    Admission Diagnosis: Near syncope [R55]    Admission Date: 10/31/2023  Expected Discharge Date: 11/2/2023     completed discharge assessment with Pt's caregiver Annamarie. Pt did not speak;  unable to assess orientation. Pt lives at [facility]. Demographics, PCP, and insurance verified. No home health (chart review shows [facility] health). No dialysis. Pt completes ADLs with the assistance of a rollator, caregiver/facility staff assistance. Pt to discharge back to [facility] via family transport. Pt has no other needs to be addressed at this time.    Transition of Care Barriers: None    Payor: MEDICARE / Plan: MEDICARE PART A & B / Product Type: Government /     Extended Emergency Contact Information  Primary Emergency Contact: Leia Barrientos  Mobile Phone: [phone]  Relation: Sister   needed? No    Discharge Plan A: Assisted Living  Discharge Plan B: Assisted Living      [facility] - [address] [facility]  [address]  [place] [address]  Phone: [phone] Fax: [phone]      Initial Assessment (most recent)       Adult Discharge Assessment - 11/01/23 1313          Discharge Assessment    Assessment Type Discharge Planning Assessment     Confirmed/corrected address, phone number and insurance Yes     Confirmed Demographics Correct on Facesheet     Source of Information health record;other (see comments)   Caregiver    If unable to respond/provide information was family/caregiver contacted? Yes     Contact Name/Number Contracted caregiverAnnamarie     Does patient/caregiver understand observation status Yes     Communicated FAB with patient/caregiver Yes     Reason For Admission Syncope     People in Home facility resident     Facility Arrived From: [facility]     Do you expect to return to your current living situation? Yes      Do you have help at home or someone to help you manage your care at home? Yes     Who are your caregiver(s) and their phone number(s)? Facilitiy staff     Prior to hospitilization cognitive status: Unable to Assess     Current cognitive status: Unable to Assess     Walking or Climbing Stairs ambulation difficulty, requires equipment;stair climbing difficulty, requires equipment;transferring difficulty, requires equipment;ambulation difficulty, assistance 1 person;stair climbing difficulty, assistance 1 person;transferring difficulty, assistance 1 person     Mobility Management Rollator; caregiver assistance     Dressing/Bathing bathing difficulty, requires equipment;bathing difficulty, assistance 1 person;dressing difficulty, requires equipment;dressing difficulty, assistance 1 person     Dressing/Bathing Management caregiver assitance     Home Accessibility wheelchair accessible     Home Layout Able to live on 1st floor     Equipment Currently Used at Home other (see comments);rollator   accessible bathroom    Readmission within 30 days? No     Patient currently being followed by outpatient case management? No     Do you currently have service(s) that help you manage your care at home? No     Do you take prescription medications? Yes     Do you have prescription coverage? Yes     Coverage Payor:  MEDICARE - MEDICARE PART A & B     Do you have any problems affording any of your prescribed medications? No     Is the patient taking medications as prescribed? yes     Who is going to help you get home at discharge? Leia Barrientos (Sister)   [phone] (Mobile)     How do you get to doctors appointments? family or friend will provide     Are you on dialysis? No     Do you take coumadin? No     DME Needed Upon Discharge  none     Discharge Plan discussed with: Caregiver     Name(s) and Number(s) Annamraie     Transition of Care Barriers None     Discharge Plan A Assisted Living     Discharge Plan B Assisted Living         OTHER    Name(s) of People in Home Facility resident

## 2023-11-02 ENCOUNTER — CLINICAL SUPPORT (OUTPATIENT)
Dept: CARDIOLOGY | Facility: HOSPITAL | Age: 78
DRG: 312 | End: 2023-11-02
Attending: STUDENT IN AN ORGANIZED HEALTH CARE EDUCATION/TRAINING PROGRAM
Payer: MEDICARE

## 2023-11-02 LAB
ALBUMIN SERPL BCP-MCNC: 3.9 G/DL (ref 3.5–5.2)
ALP SERPL-CCNC: 79 U/L (ref 55–135)
ALT SERPL W/O P-5'-P-CCNC: 20 U/L (ref 10–44)
ANION GAP SERPL CALC-SCNC: 8 MMOL/L (ref 8–16)
AORTIC ROOT ANNULUS: 4 CM
AORTIC VALVE CUSP SEPERATION: 1.7 CM
AST SERPL-CCNC: 15 U/L (ref 10–40)
AV INDEX (PROSTH): 0.83
AV MEAN GRADIENT: 5 MMHG
AV PEAK GRADIENT: 10 MMHG
AV VALVE AREA BY VELOCITY RATIO: 2.44 CM²
AV VALVE AREA: 2.62 CM²
AV VELOCITY RATIO: 0.78
BASOPHILS # BLD AUTO: 0.04 K/UL (ref 0–0.2)
BASOPHILS NFR BLD: 0.4 % (ref 0–1.9)
BILIRUB SERPL-MCNC: 0.4 MG/DL (ref 0.1–1)
BSA FOR ECHO PROCEDURE: 1.94 M2
BUN SERPL-MCNC: 26 MG/DL (ref 8–23)
CALCIUM SERPL-MCNC: 9.2 MG/DL (ref 8.7–10.5)
CHLORIDE SERPL-SCNC: 106 MMOL/L (ref 95–110)
CO2 SERPL-SCNC: 24 MMOL/L (ref 23–29)
CREAT SERPL-MCNC: 0.9 MG/DL (ref 0.5–1.4)
CV ECHO LV RWT: 0.44 CM
DIFFERENTIAL METHOD: ABNORMAL
DOP CALC AO PEAK VEL: 1.56 M/S
DOP CALC AO VTI: 31.8 CM
DOP CALC LVOT AREA: 3.1 CM2
DOP CALC LVOT DIAMETER: 2 CM
DOP CALC LVOT PEAK VEL: 1.21 M/S
DOP CALC LVOT STROKE VOLUME: 83.21 CM3
DOP CALC MV VTI: 33.8 CM
DOP CALCLVOT PEAK VEL VTI: 26.5 CM
E WAVE DECELERATION TIME: 299 MSEC
E/A RATIO: 0.64
E/E' RATIO: 9 M/S
ECHO LV POSTERIOR WALL: 0.86 CM (ref 0.6–1.1)
EOSINOPHIL # BLD AUTO: 0.2 K/UL (ref 0–0.5)
EOSINOPHIL NFR BLD: 2.4 % (ref 0–8)
ERYTHROCYTE [DISTWIDTH] IN BLOOD BY AUTOMATED COUNT: 13.9 % (ref 11.5–14.5)
EST. GFR  (NO RACE VARIABLE): >60 ML/MIN/1.73 M^2
FRACTIONAL SHORTENING: 40 % (ref 28–44)
GLUCOSE SERPL-MCNC: 89 MG/DL (ref 70–110)
HCT VFR BLD AUTO: 43.9 % (ref 40–54)
HGB BLD-MCNC: 14.5 G/DL (ref 14–18)
IMM GRANULOCYTES # BLD AUTO: 0.06 K/UL (ref 0–0.04)
IMM GRANULOCYTES NFR BLD AUTO: 0.6 % (ref 0–0.5)
INTERVENTRICULAR SEPTUM: 1.16 CM (ref 0.6–1.1)
LEFT INTERNAL DIMENSION IN SYSTOLE: 2.36 CM (ref 2.1–4)
LEFT VENTRICLE DIASTOLIC VOLUME INDEX: 34.44 ML/M2
LEFT VENTRICLE DIASTOLIC VOLUME: 67.5 ML
LEFT VENTRICLE MASS INDEX: 64 G/M2
LEFT VENTRICLE SYSTOLIC VOLUME INDEX: 9.8 ML/M2
LEFT VENTRICLE SYSTOLIC VOLUME: 19.3 ML
LEFT VENTRICULAR INTERNAL DIMENSION IN DIASTOLE: 3.94 CM (ref 3.5–6)
LEFT VENTRICULAR MASS: 125.85 G
LV LATERAL E/E' RATIO: 7.88 M/S
LV SEPTAL E/E' RATIO: 10.5 M/S
LVOT MG: 3 MMHG
LVOT MV: 0.81 CM/S
LYMPHOCYTES # BLD AUTO: 2.1 K/UL (ref 1–4.8)
LYMPHOCYTES NFR BLD: 22.5 % (ref 18–48)
MCH RBC QN AUTO: 30.7 PG (ref 27–31)
MCHC RBC AUTO-ENTMCNC: 33 G/DL (ref 32–36)
MCV RBC AUTO: 93 FL (ref 82–98)
MONOCYTES # BLD AUTO: 1.1 K/UL (ref 0.3–1)
MONOCYTES NFR BLD: 11.9 % (ref 4–15)
MV MEAN GRADIENT: 2 MMHG
MV PEAK A VEL: 0.98 M/S
MV PEAK E VEL: 0.63 M/S
MV PEAK GRADIENT: 5 MMHG
MV VALVE AREA BY CONTINUITY EQUATION: 2.46 CM2
NEUTROPHILS # BLD AUTO: 5.9 K/UL (ref 1.8–7.7)
NEUTROPHILS NFR BLD: 62.2 % (ref 38–73)
NRBC BLD-RTO: 0 /100 WBC
PLATELET # BLD AUTO: 281 K/UL (ref 150–450)
PMV BLD AUTO: 10.2 FL (ref 9.2–12.9)
POTASSIUM SERPL-SCNC: 3.9 MMOL/L (ref 3.5–5.1)
PROT SERPL-MCNC: 6.9 G/DL (ref 6–8.4)
PV MV: 0.76 M/S
PV PEAK GRADIENT: 5 MMHG
PV PEAK VELOCITY: 1.1 M/S
RA PRESSURE ESTIMATED: 3 MMHG
RBC # BLD AUTO: 4.72 M/UL (ref 4.6–6.2)
RV TISSUE DOPPLER FREE WALL SYSTOLIC VELOCITY 1 (APICAL 4 CHAMBER VIEW): 16.8 CM/S
SODIUM SERPL-SCNC: 138 MMOL/L (ref 136–145)
TDI LATERAL: 0.08 M/S
TDI SEPTAL: 0.06 M/S
TDI: 0.07 M/S
TRICUSPID ANNULAR PLANE SYSTOLIC EXCURSION: 2.97 CM
WBC # BLD AUTO: 9.51 K/UL (ref 3.9–12.7)
Z-SCORE OF LEFT VENTRICULAR DIMENSION IN END DIASTOLE: -3.53
Z-SCORE OF LEFT VENTRICULAR DIMENSION IN END SYSTOLE: -3

## 2023-11-02 PROCEDURE — 97161 PT EVAL LOW COMPLEX 20 MIN: CPT

## 2023-11-02 PROCEDURE — 93306 TTE W/DOPPLER COMPLETE: CPT

## 2023-11-02 PROCEDURE — 25000003 PHARM REV CODE 250: Performed by: STUDENT IN AN ORGANIZED HEALTH CARE EDUCATION/TRAINING PROGRAM

## 2023-11-02 PROCEDURE — 93306 TTE W/DOPPLER COMPLETE: CPT | Mod: 26,,, | Performed by: INTERNAL MEDICINE

## 2023-11-02 PROCEDURE — 36415 COLL VENOUS BLD VENIPUNCTURE: CPT | Performed by: STUDENT IN AN ORGANIZED HEALTH CARE EDUCATION/TRAINING PROGRAM

## 2023-11-02 PROCEDURE — 97165 OT EVAL LOW COMPLEX 30 MIN: CPT

## 2023-11-02 PROCEDURE — 93306 ECHO (CUPID ONLY): ICD-10-PCS | Mod: 26,,, | Performed by: INTERNAL MEDICINE

## 2023-11-02 PROCEDURE — 99223 1ST HOSP IP/OBS HIGH 75: CPT | Mod: ,,, | Performed by: INTERNAL MEDICINE

## 2023-11-02 PROCEDURE — 80053 COMPREHEN METABOLIC PANEL: CPT | Performed by: STUDENT IN AN ORGANIZED HEALTH CARE EDUCATION/TRAINING PROGRAM

## 2023-11-02 PROCEDURE — 97116 GAIT TRAINING THERAPY: CPT

## 2023-11-02 PROCEDURE — 12000002 HC ACUTE/MED SURGE SEMI-PRIVATE ROOM

## 2023-11-02 PROCEDURE — 63600175 PHARM REV CODE 636 W HCPCS: Performed by: STUDENT IN AN ORGANIZED HEALTH CARE EDUCATION/TRAINING PROGRAM

## 2023-11-02 PROCEDURE — 99223 PR INITIAL HOSPITAL CARE,LEVL III: ICD-10-PCS | Mod: ,,, | Performed by: INTERNAL MEDICINE

## 2023-11-02 PROCEDURE — 97535 SELF CARE MNGMENT TRAINING: CPT

## 2023-11-02 PROCEDURE — 85025 COMPLETE CBC W/AUTO DIFF WBC: CPT | Performed by: STUDENT IN AN ORGANIZED HEALTH CARE EDUCATION/TRAINING PROGRAM

## 2023-11-02 RX ADMIN — SERTRALINE HYDROCHLORIDE 50 MG: 50 TABLET ORAL at 08:11

## 2023-11-02 RX ADMIN — CHOLECALCIFEROL (VITAMIN D3) 10 MCG (400 UNIT) TABLET 4800 UNITS: at 09:11

## 2023-11-02 RX ADMIN — ENOXAPARIN SODIUM 40 MG: 40 INJECTION SUBCUTANEOUS at 05:11

## 2023-11-02 RX ADMIN — ROPINIROLE HYDROCHLORIDE 0.5 MG: 0.25 TABLET, FILM COATED ORAL at 08:11

## 2023-11-02 RX ADMIN — SENNOSIDES AND DOCUSATE SODIUM 1 TABLET: 50; 8.6 TABLET ORAL at 08:11

## 2023-11-02 RX ADMIN — SENNOSIDES AND DOCUSATE SODIUM 1 TABLET: 50; 8.6 TABLET ORAL at 09:11

## 2023-11-02 RX ADMIN — ATORVASTATIN CALCIUM 40 MG: 40 TABLET, FILM COATED ORAL at 08:11

## 2023-11-02 NOTE — PT/OT/SLP EVAL
Physical Therapy Evaluation and Discharge Note    Patient Name:  Amilcar Phillip   MRN:  [id number]    Recommendations:     Discharge Recommendations: Low Intensity Therapy  Discharge Equipment Recommendations: none   Barriers to discharge:  medical status    Assessment:     Amilcar Phillip is a 78 y.o. male admitted with a medical diagnosis of Syncope. .  At this time, patient is functioning at their prior level of function and does not require further acute PT services.     Patient caregiver reports he is at baseline and she has been assisting with patient as needed since coming to the hospital.    Recent Surgery: * No surgery found *      Plan:     During this hospitalization, patient does not require further acute PT services.  Please re-consult if situation changes.      Subjective     Chief Complaint: none stated  Patient/Family Comments/goals: none stated  Pain/Comfort:  Pain Rating 1: 0/10    Patients cultural, spiritual, Bahai conflicts given the current situation: no    Living Environment:  Pt lives at [facility] assisted lives with additional aid assist.   Prior to admission, patients level of function was CGA no AD.  Equipment used at home: shower chair.  DME owned (not currently used): none.  Upon discharge, patient will have assistance from facility and private caregivers.    Objective:     Communicated with RN prior to session.  Patient found up in chair with peripheral IV, telemetry upon PT entry to room.    General Precautions: Standard, fall    Orthopedic Precautions:N/A   Braces: N/A  Respiratory Status: Room air    Exams:  RLE ROM: WFL  RLE Strength: WFL  LLE ROM: WFL  LLE Strength: WFL    Functional Mobility:  Transfers:     Sit to Stand:  contact guard assistance with no AD  Gait: 100 ft with no AD and CGA with forward flexed posture which is baseline, no loss of balance or shortness of breath    AM-PAC 6 CLICK MOBILITY  Total Score:17       Treatment and Education:  Pt educated  on POC, discharge recommendation, need for assist with mobility, importance of time OOB, use of call bell to seek assistance as needed and fall prevention      AM-PAC 6 CLICK MOBILITY  Total Score:17     Patient left up in chair with all lines intact, call button in reach, and private caregiver present.    GOALS:   Multidisciplinary Problems       Physical Therapy Goals       Not on file                    History:     Past Medical History:   Diagnosis Date    Dementia with behavioral disturbance 12/23/2021    Hypercholesteremia     Hypertension     Progressive supranuclear ophthalmoplegia     Vitamin D deficiency        Past Surgical History:   Procedure Laterality Date    COLONOSCOPY      HERNIA REPAIR         Time Tracking:     PT Received On: 11/02/23  PT Start Time: 1010     PT Stop Time: 1026  PT Total Time (min): 16 min     Billable Minutes: Evaluation 8 and Gait Training 8      11/02/2023

## 2023-11-02 NOTE — CONSULTS
Count includes the [facility]  Department of Cardiology  Consult Note      PATIENT NAME: Amilcar Phillip  MRN: [id number]  TODAY'S DATE: 11/02/2023  ADMIT DATE: 10/31/2023                          CONSULT REQUESTED BY: Thom Carlos MD    SUBJECTIVE     PRINCIPAL PROBLEM: Syncope      REASON FOR CONSULT:    Sunday, near syncope, ?stress for chronotropic response      HPI:    78 y.o.  male with known history of HTN, dementia, vit d deficiency, HLD, multiple falls who presented to ED with dizziness, pallor, diaphoresis and near syncope while sitting at table.  In ED, patient was found to be bradycardiac with HR 50s.  No beta blockers on med list.  History obtained from caregiver 2/2 patient hx of dementia.     IN ED: H&H 13/41, K 3.6, Cr 0.9, Mag 1.9, Trop 3.0, TSH 1.535, CT head no acute findings., CXR negative. EKG SB    ECHO 7/19/22    The left ventricle is normal in size with concentric remodeling and normal systolic function.  Normal left ventricular diastolic function.  The estimated ejection fraction is 60%.  With normal right ventricular systolic function.  Trace to Mild mitral regurgitation.  Normal central venous pressure (3 mmHg).  The estimated PA systolic pressure is 21 mmHg.    FROM H&P     Amilcar Phillip is a 78 y.o.  male with known history of HTN, dementia, vit d deficiency  who presents after an episode of syncope of near syncope.  He has a history of dementia so most of his history is from his caretaker who is present in the ER.  She states he mentioned feeling dizzy, then while seated at a table he had an episode where he seemed to almost lose consciousness, with his eyes rolling back for a brief period of less than a minute.  She also notes that he looked vary pale and diaphoretic, he also had associated shortness of breath.  She is not aware of any similar episodes in the past, although there have been previous admissions where he was found down at home.  He lives in an assisted living  facility.  His only med change was his PCP stopping an unknown blood pressure medication recently due to his bp being controlled or low while taking it.  On arrival to ER his HR is 50.  No beta blockers or other rate controlling meds on home med list.  Patient denies chest pain, palpitations, abdominal pain, nausea, vomiting, diarrhea, hematochezia, hematemesis, fever, cough, congestion, runny nose, hearing, numbness, tingling, headache, focal weakness.  History was obtained from the  caregiver present at the bedside and ER physician Sign-out.         Review of patient's allergies indicates:  No Known Allergies    Past Medical History:   Diagnosis Date    Dementia with behavioral disturbance 2021    Hypercholesteremia     Hypertension     Progressive supranuclear ophthalmoplegia     Vitamin D deficiency      Past Surgical History:   Procedure Laterality Date    COLONOSCOPY      HERNIA REPAIR       Social History     Tobacco Use    Smoking status: Former     Current packs/day: 0.00     Types: Cigarettes, Cigars     Quit date:      Years since quittin.8    Smokeless tobacco: Never   Substance Use Topics    Alcohol use: Yes    Drug use: Never        REVIEW OF SYSTEMS    As mentioned in HPI    OBJECTIVE     VITAL SIGNS (Most Recent)  Temp: 98.1 °F (36.7 °C) (23)  Pulse: (!) 54 (23)  Resp: 18 (23)  BP: 117/63 (23)  SpO2: (!) 94 % (23)    VENTILATION STATUS  Resp: 18 (23)  SpO2: (!) 94 % (23)           I & O (Last 24H):  Intake/Output Summary (Last 24 hours) at 2023 0838  Last data filed at 2023 2116  Gross per 24 hour   Intake 120 ml   Output --   Net 120 ml       WEIGHTS  Wt Readings from Last 3 Encounters:   10/31/23 1534 84.8 kg (186 lb 15.2 oz)   10/31/23 0924 79.4 kg (175 lb)   23 1402 78 kg (172 lb)   23 1604 78 kg (172 lb)       PHYSICAL EXAM    CONSTITUTIONAL: NAD  HEENT: Normocephalic. No  pallor  NECK: no JVD  LUNGS: CTA b/l  HEART: regular rate and rhythm, S1, S2 normal, no murmur   ABDOMEN: soft, non-tender, bowel sounds normal  EXTREMITIES: No edema  SKIN: No rash  NEURO: AAO X 1 self  PSYCH: flat affect    HOME MEDICATIONS:  No current facility-administered medications on file prior to encounter.     Current Outpatient Medications on File Prior to Encounter   Medication Sig Dispense Refill    acetaminophen (TYLENOL) 325 MG tablet Take 2 tablets (650 mg total) by mouth every 6 (six) hours as needed for Pain.  0    carbidopa-levodopa  mg (SINEMET)  mg per tablet Take 12 tablets by mouth As instructed. Take 2 tablets in the morning, 1 tablet at lunch and 1 at bedtime      cholecalciferol, vitamin D3, 125 mcg (5,000 unit) capsule TAKE ONE CAPSULE BY MOUTH DAILY 90 capsule 1    donepeziL (ARICEPT) 10 MG tablet Take 1 tablet (10 mg total) by mouth every evening. 90 tablet 1    meloxicam (MOBIC) 15 MG tablet Take 15 mg by mouth once daily.      multivitamin (THERAGRAN) per tablet Take 1 tablet by mouth once daily.      OXcarbazepine (TRILEPTAL) 150 MG Tab TAKE ONE TABLET BY MOUTH EVERY MORNING and TAKE ONE TABLET BY MOUTH EVERY EVENING 180 tablet 1    rOPINIRole (REQUIP) 0.5 MG tablet TAKE ONE TABLET BY MOUTH EVERY EVENING 90 tablet 1    rosuvastatin (CRESTOR) 10 MG tablet Take 1 tablet (10 mg total) by mouth once daily. 90 tablet 1    sertraline (ZOLOFT) 50 MG tablet TAKE ONE TABLET BY MOUTH EVERY EVENING 90 tablet 1       SCHEDULED MEDS:   atorvastatin  40 mg Oral Daily    cholecalciferol (vitamin D3)  4,800 Units Oral Daily    donepeziL  10 mg Oral QHS    enoxparin  40 mg Subcutaneous Daily    rOPINIRole  0.5 mg Oral QHS    senna-docusate 8.6-50 mg  1 tablet Oral BID    sertraline  50 mg Oral QHS       CONTINUOUS INFUSIONS:    PRN MEDS:acetaminophen, acetaminophen, melatonin, ondansetron, sodium chloride 0.9%    LABS AND DIAGNOSTICS     CBC LAST 3 DAYS  Recent Labs   Lab 10/31/23  1009  "11/01/23  0434 11/02/23  0621   WBC 10.10 11.46 9.51   RBC 4.38* 4.55* 4.72   HGB 13.5* 13.9* 14.5   HCT 41.5 41.8 43.9   MCV 95 92 93   MCH 30.8 30.5 30.7   MCHC 32.5 33.3 33.0   RDW 13.7 13.6 13.9    285 281   MPV 10.0 10.4 10.2   GRAN 78.7*  8.0* 70.9  8.1* 62.2  5.9   LYMPH 11.5*  1.2 16.9*  1.9 22.5  2.1   MONO 7.5  0.8 9.6  1.1* 11.9  1.1*   BASO 0.02 0.03 0.04   NRBC 0 0 0       COAGULATION LAST 3 DAYS  No results for input(s): "LABPT", "INR", "APTT" in the last 168 hours.    CHEMISTRY LAST 3 DAYS  Recent Labs   Lab 10/31/23  1009 11/01/23  0434    139   K 3.6 3.5    105   CO2 28 28   ANIONGAP 4* 6*   BUN 15 18   CREATININE 0.9 0.9    89   CALCIUM 8.4* 9.0   MG 1.9  --    ALBUMIN 3.9 4.0   PROT 6.6 6.9   ALKPHOS 79 82   ALT 3* 18   AST 14 13   BILITOT 0.5 0.6       CARDIAC PROFILE LAST 3 DAYS  Recent Labs   Lab 10/31/23  1009   TROPONINIHS 3.0       ENDOCRINE LAST 3 DAYS  Recent Labs   Lab 10/31/23  1009   TSH 1.535       LAST ARTERIAL BLOOD GAS  ABG  No results for input(s): "PH", "PO2", "PCO2", "HCO3", "BE" in the last 168 hours.    LAST 7 DAYS MICROBIOLOGY   Microbiology Results (last 7 days)       ** No results found for the last 168 hours. **            MOST RECENT IMAGING  CT Head Without Contrast  All CT scans at this facility use dose modulation, degenerative reconstruction  and/or weight-based dosing when appropriate to reduce radiation dose to as low as reasonably achievable.    CT of the brain without contrast    CLINICAL HISTORY: Trauma, headache.    There are changes of decrease in the attenuation of the periventricular and subcortical white matter which represents a nonspecific finding most likely representative of areas of demyelination on the basis of chronic small vessel ischemia. There are no findings of acute intracranial hemorrhage or infarction. There is no intra-axial mass, mass effect or shift of the midline.    Prominence of the ventricular system and " sulci are a reflection of cortical atrophy. Arteriosclerotic calcification is observed within the intracranial segments of the carotid and vertebral arteries.    Viewed at bone windows there is no evidence of skull fracture. Mixed density opacification of the left maxillary sinus extends into the left side of the nasal fossa but appears unchanged from prior examination.    IMPRESSION:    Chronic small vessel ischemic changes and age-related involutional changes.    Arteriosclerosis.    No acute intracranial abnormality.    Incidental sinus disease    Electronically signed by:  Aris Baez MD  10/31/2023 02:25 PM CDT Workstation: [id number]  X-Ray Chest AP Portable  XR CHEST 1 VIEW    CLINICAL HISTORY:  78 years Male Near syncope    COMPARISON: 7/7/2022    FINDINGS: Cardiomediastinal silhouette is within normal limits. Lungs are normally expanded with no airspace consolidation. No pleural effusion or pneumothorax. No acute osseous abnormality.    IMPRESSION: No acute pulmonary process.    Electronically signed by:  Savannah Arango MD  10/31/2023 11:51 AM CDT Workstation: [id number]      ECHOCARDIOGRAM RESULTS (last 5)  Results for orders placed during the hospital encounter of 07/11/22    Echo    Interpretation Summary  · The left ventricle is normal in size with concentric remodeling and normal systolic function.  · Normal left ventricular diastolic function.  · The estimated ejection fraction is 60%.  · With normal right ventricular systolic function.  · Trace to Mild mitral regurgitation.  · Normal central venous pressure (3 mmHg).  · The estimated PA systolic pressure is 21 mmHg.      Results for orders placed in visit on 08/31/20    Echo Color Flow Doppler? Yes    Interpretation Summary  · The estimated PA systolic pressure is 22 mmHg.  · Normal left ventricular systolic function. The estimated ejection fraction is 55%.  · Normal LV diastolic function.  · No wall motion abnormalities.  · Normal right  ventricular systolic function.  · Mild left atrial enlargement.  · No pulmonary hypertension present.  · Normal central venous pressure (3 mmHg).  · Mild tricuspid regurgitation.      CURRENT/PREVIOUS VISIT EKG  Results for orders placed or performed during the hospital encounter of 10/31/23   EKG 12-lead    Collection Time: 10/31/23  9:41 AM    Narrative    Test Reason : R55,    Vent. Rate : 053 BPM     Atrial Rate : 053 BPM     P-R Int : 184 ms          QRS Dur : 094 ms      QT Int : 468 ms       P-R-T Axes : 029 -18 038 degrees     QTc Int : 439 ms    Sinus bradycardia  Cannot rule out Anterior infarct ,age undetermined  Abnormal ECG  When compared with ECG of 07-JUL-2022 13:23,  Nonspecific T wave abnormality no longer evident in Inferior leads    Referred By: [email or web address]   SELF           Confirmed By:            ASSESSMENT/PLAN:     Active Hospital Problems    Diagnosis    *Syncope       ASSESSMENT & PLAN:     Near Syncope  Dizziness  Bradycardia  Hypertension  Dementia  Hyperlipidemia      RECOMMENDATIONS:    Near syncope, with associated dizziness, diaphoresis, and pallor.  SB noted in ED.  No acute ST-T wave changes.  Troponin HS negative. Denies CP.   Consider discontinuing donepezil as this can cause bradycardia.  ECHO 7/2022- EF 60% with normal diastolic function.  Trace MR.   BP stable.  Obtain orthostatics. Patient ambulates at facility per family.  Recommend Physical therapy.   Carotid US in 2020 negative.    Tachy-rock noted on tele.  SR on tele.   Discussed with patient's sister the options for OP cardiac monitoring and possible need for PPM in future if bradycardia worsens. Recommend palliative care evaluation.  Patient's sister is agreeable to speaking with palliative care.   Thank you for the consult. We will follow.        Janelle Bennett NP  Department of Cardiology  Date of Service: 11/02/2023

## 2023-11-02 NOTE — PROGRESS NOTES
[facility] Medicine  Progress Note    Patient name: Amilcar Phillip  MRN: [id number]  Admit Date: 10/31/2023   LOS: 1 day     SUBJECTIVE:     Principal problem: Syncope    HPI  Amilcar Phillip is a 78 y.o.  male with known history of HTN, dementia, vit d deficiency  who presents after an episode of syncope of near syncope.  He has a history of dementia so most of his history is from his caretaker who is present in the ER.  She states he mentioned feeling dizzy, then while seated at a table he had an episode where he seemed to almost lose consciousness, with his eyes rolling back for a brief period of less than a minute.  She also notes that he looked vary pale and diaphoretic, he also had associated shortness of breath.  She is not aware of any similar episodes in the past, although there have been previous admissions where he was found down at home.  He lives in an assisted living facility.  His only med change was his PCP stopping an unknown blood pressure medication recently due to his bp being controlled or low while taking it.  On arrival to ER his HR is 50.  No beta blockers or other rate controlling meds on home med list.  Patient denies chest pain, palpitations, abdominal pain, nausea, vomiting, diarrhea, hematochezia, hematemesis, fever, cough, congestion, runny nose, hearing, numbness, tingling, headache, focal weakness.  History was obtained from the  caregiver present at the bedside and ER physician Sign-out.     Interval History:      11/1/23- he did well overnight, HR low 50s at times.  Looking back in EMR he may have had several previous episodes of syncope.  May need a loop or holter.  ?stress for chronotropic response.  Consulted cardiology    11/2 - echo and carotid ultrasound pending.  Patient with no complaints.      Scheduled Meds:   atorvastatin  40 mg Oral Daily    cholecalciferol (vitamin D3)  4,800 Units Oral Daily    donepeziL  10 mg Oral QHS    enoxparin   40 mg Subcutaneous Daily    rOPINIRole  0.5 mg Oral QHS    senna-docusate 8.6-50 mg  1 tablet Oral BID    sertraline  50 mg Oral QHS     Continuous Infusions:  PRN Meds:acetaminophen, acetaminophen, melatonin, ondansetron, sodium chloride 0.9%    Review of patient's allergies indicates:  No Known Allergies    Review of Systems: As per interval history    OBJECTIVE:     Vital Signs (Most Recent)  Temp: 97.3 °F (36.3 °C) (11/02/23 1140)  Pulse: 70 (11/02/23 1140)  Resp: 16 (11/02/23 1140)  BP: 109/73 (11/02/23 1140)  SpO2: 97 % (11/02/23 1140)    Vital Signs Range (Last 24H):  Temp:  [97.3 °F (36.3 °C)-98.8 °F (37.1 °C)]   Pulse:  [54-79]   Resp:  [16-18]   BP: (102-129)/(63-83)   SpO2:  [94 %-97 %]     I & O (Last 24H):  Intake/Output Summary (Last 24 hours) at 11/2/2023 1553  Last data filed at 11/2/2023 1405  Gross per 24 hour   Intake 720 ml   Output --   Net 720 ml       Physical Exam:  General: Patient resting comfortably in no acute distress. Appears as stated age. Calm  Eyes: No conjunctival injection. No scleral icterus.  ENT: Hearing grossly intact. No discharge from ears. No nasal discharge.   Neck: Supple, trachea midline. No JVD  CVS: RRR. No LE edema BL  Lungs:  No tachypnea or accessory muscle use.  Clear to auscultation bilaterally  Abdomen:  Soft, nontender and nondistended.  No organomegaly  Neuro: slight tremor, flat affect, masked face?. AOx3. Moves all extremities. Follows commands. Responds appropriately   Skin:  No rash or erythema noted    Laboratory:  I have reviewed all pertinent lab results within the past 24 hours.    Diagnostic Results:  Labs: Reviewed  ECG: Reviewed  X-Ray: Reviewed  CT: Reviewed    ASSESSMENT/PLAN:       Active Hospital Problems    Diagnosis  POA    *Syncope [R55]  Unknown      Resolved Hospital Problems   No resolved problems to display.         Plan:     Near Syncope  Bradycardia  EKG sinus rock, trop negative  BP wnl  HR eventually increased to 70s   Echo about a  year ago with good EF, only mild AS  No meds to cause bradycardia  UA no infection, 34 hyaline casts possibly indicating hypotension  Admit to obs  Cardiac monitor  Check orthostatic vitals  TSH  Head CT negative  Further w/u pending course  May need a holter or loop recorder  ?stress test for chronotropic response  Cardiology consulted.  Echo and carotid ultrasound pending.     Dementia, unspecified  Noted mild tremor, masked face, flat affect.  He has had some falls.  ?bradycardia r/t dysautonomia.    Consider neuro consult vs outpt follow up for eval parkinsons     Vitamin D deficiency  Resume home meds         VTE Risk Mitigation (From admission, onward)           Ordered     enoxaparin injection 40 mg  Daily         10/31/23 1325     IP VTE HIGH RISK PATIENT  Once         10/31/23 1325     Place sequential compression device  Until discontinued         10/31/23 1325                        Department Hospital Medicine  Counts include 234 beds at the [facility]  Thom Carlos MD  Date of service: 11/02/2023

## 2023-11-02 NOTE — PROGRESS NOTES
[facility] Medicine  Progress Note    Patient name: Amilcar Phillip  MRN: [id number]  Admit Date: 10/31/2023   LOS: 0 days     SUBJECTIVE:     Principal problem: Syncope    HPI  Amilcar Phillip is a 78 y.o.  male with known history of HTN, dementia, vit d deficiency  who presents after an episode of syncope of near syncope.  He has a history of dementia so most of his history is from his caretaker who is present in the ER.  She states he mentioned feeling dizzy, then while seated at a table he had an episode where he seemed to almost lose consciousness, with his eyes rolling back for a brief period of less than a minute.  She also notes that he looked vary pale and diaphoretic, he also had associated shortness of breath.  She is not aware of any similar episodes in the past, although there have been previous admissions where he was found down at home.  He lives in an assisted living facility.  His only med change was his PCP stopping an unknown blood pressure medication recently due to his bp being controlled or low while taking it.  On arrival to ER his HR is 50.  No beta blockers or other rate controlling meds on home med list.  Patient denies chest pain, palpitations, abdominal pain, nausea, vomiting, diarrhea, hematochezia, hematemesis, fever, cough, congestion, runny nose, hearing, numbness, tingling, headache, focal weakness.  History was obtained from the  caregiver present at the bedside and ER physician Sign-out.     Interval History:      11/1/23- he did well overnight, HR low 50s at times.  Looking back in EMR he may have had several previous episodes of syncope.  May need a loop or holter.  ?stress for chronotropic response.  Consulted cardiology    Scheduled Meds:   atorvastatin  40 mg Oral Daily    cholecalciferol (vitamin D3)  4,800 Units Oral Daily    donepeziL  10 mg Oral QHS    enoxparin  40 mg Subcutaneous Daily    rOPINIRole  0.5 mg Oral QHS    senna-docusate  8.6-50 mg  1 tablet Oral BID    sertraline  50 mg Oral QHS     Continuous Infusions:  PRN Meds:acetaminophen, acetaminophen, melatonin, ondansetron, sodium chloride 0.9%    Review of patient's allergies indicates:  No Known Allergies    Review of Systems: As per interval history    OBJECTIVE:     Vital Signs (Most Recent)  Temp: 97.5 °F (36.4 °C) (11/01/23 1555)  Pulse: 73 (11/01/23 1555)  Resp: 18 (11/01/23 1555)  BP: 122/72 (11/01/23 1555)  SpO2: 95 % (11/01/23 1555)    Vital Signs Range (Last 24H):  Temp:  [97.5 °F (36.4 °C)-98.1 °F (36.7 °C)]   Pulse:  [55-73]   Resp:  [18]   BP: (122-161)/(61-77)   SpO2:  [95 %-97 %]     I & O (Last 24H):  Intake/Output Summary (Last 24 hours) at 11/1/2023 2015  Last data filed at 10/31/2023 2040  Gross per 24 hour   Intake 120 ml   Output --   Net 120 ml       Physical Exam:  General: Patient resting comfortably in no acute distress. Appears as stated age. Calm  Eyes: No conjunctival injection. No scleral icterus.  ENT: Hearing grossly intact. No discharge from ears. No nasal discharge.   Neck: Supple, trachea midline. No JVD  CVS: RRR. No LE edema BL  Lungs:  No tachypnea or accessory muscle use.  Clear to auscultation bilaterally  Abdomen:  Soft, nontender and nondistended.  No organomegaly  Neuro: slight tremor, flat affect, masked face?. AOx3. Moves all extremities. Follows commands. Responds appropriately   Skin:  No rash or erythema noted    Laboratory:  I have reviewed all pertinent lab results within the past 24 hours.    Diagnostic Results:  Labs: Reviewed  ECG: Reviewed  X-Ray: Reviewed  CT: Reviewed    ASSESSMENT/PLAN:       Active Hospital Problems    Diagnosis  POA    *Syncope [R55]  Unknown      Resolved Hospital Problems   No resolved problems to display.         Plan:     Near Syncope  Bradycardia  EKG sinus rock, trop negative  BP wnl  HR eventually increased to 70s   Echo about a year ago with good EF, only mild AS  No meds to cause bradycardia  UA no  infection, 34 hyaline casts possibly indicating hypotension  Admit to obs  Cardiac monitor  Check orthostatic vitals  TSH  Head CT negative  Further w/u pending course  May need a holter or loop recorder  ?stress test for chronotropic response     Dementia, unspecified  Noted mild tremor, masked face, flat affect.  He has had some falls.  ?bradycardia r/t dysautonomia.    Consider neuro consult vs outpt follow up for eval parkinsons     Vitamin D deficiency  Resume home meds         VTE Risk Mitigation (From admission, onward)           Ordered     enoxaparin injection 40 mg  Daily         10/31/23 1325     IP VTE HIGH RISK PATIENT  Once         10/31/23 1325     Place sequential compression device  Until discontinued         10/31/23 1325                        Department Hospital Medicine  [facility]  Benji Pederson MD  Date of service: 11/01/2023

## 2023-11-02 NOTE — PT/OT/SLP EVAL
Occupational Therapy   Evaluation and Discharge Note    Name: Amilcar Phillip  MRN: [id number]  Admitting Diagnosis: Syncope  Recent Surgery: * No surgery found *      Recommendations:     Discharge Recommendations: Low Intensity Therapy  Discharge Equipment Recommendations: hospital bed  Barriers to discharge:  None    Assessment:     Amilcar Phillip is a 78 y.o. male with a medical diagnosis of Syncope. At this time, patient is functioning at their prior level of function and does not require further acute OT services. Patient receives  services at [facility].     Plan:     During this hospitalization, patient does not require further acute OT services.  Please re-consult if situation changes.    Plan of Care Reviewed with: patient, caregiver    Subjective     Chief Complaint: no new complaints  Patient/Family Comments/goals: return home     Occupational Profile:  Living Environment: lives at [facility] with paid caregivers in place  Previous level of function: performs self care with use of gait belt and assistance of one person   Equipment Used at home: rollator  Assistance upon Discharge: facility assistance and paid caregivers     Pain/Comfort:  Pain Rating 1: 0/10  Pain Rating Post-Intervention 1: 0/10    Patients cultural, spiritual, Shinto conflicts given the current situation: no    Objective:     Communicated with: nurse prior to session.  Patient found HOB elevated with peripheral IV, telemetry upon OT entry to room.    General Precautions: Standard, fall  Orthopedic Precautions: N/A  Braces: N/A  Respiratory Status: Room air     Occupational Performance:    Bed Mobility:    Patient completed Rolling/Turning to Right with minimum assistance  Patient completed Scooting/Bridging with minimum assistance  Patient completed Supine to Sit with minimum assistance    Functional Mobility/Transfers:  Patient completed Sit <> Stand Transfer with minimum assistance  with  no assistive device      Activities of Daily Living:  Toileting: minimum assistance for toileting including clothing management and hygiene    Cognitive/Visual Perceptual:  Cognitive/Psychosocial Skills:     -       Oriented to: Person, Place, and Time   -       Follows Commands/attention:Follows multistep  commands  -       Communication: dysarthria  -       Memory: No Deficits noted  -       Safety awareness/insight to disability: impaired   -       Mood/Affect/Coping skills/emotional control: Appropriate to situation and Cooperative    Physical Exam:  Upper Extremity Range of Motion:     -       Right Upper Extremity: WFL  -       Left Upper Extremity: WFL  Upper Extremity Strength:    -       Right Upper Extremity: WFL  -       Left Upper Extremity: WFL   Strength:    -       Right Upper Extremity: WFL  -       Left Upper Extremity: WFL  Fine Motor Coordination:    -       Impaired coordination for self feeding; issued and instructed in adapted utensil use    AMPA 6 Click ADL:  AMPAC Total Score: 14    Treatment & Education:  Patient and paid caregiver were instructed in role of OT/POC, discharge planning and equipment needs, safety during transfers and reviewed use of call bell for assistance.     Patient left HOB elevated with all lines intact, call button in reach, and paid caregiver present      History:     Past Medical History:   Diagnosis Date    Dementia with behavioral disturbance 12/23/2021    Hypercholesteremia     Hypertension     Progressive supranuclear ophthalmoplegia     Vitamin D deficiency          Past Surgical History:   Procedure Laterality Date    COLONOSCOPY      HERNIA REPAIR         Time Tracking:     OT Date of Treatment: 11/02/23  OT Start Time: 0910  OT Stop Time: 0930  OT Total Time (min): 20 min    Billable Minutes:Evaluation 10  Self Care/Home Management 10    11/2/2023

## 2023-11-03 VITALS
OXYGEN SATURATION: 95 % | DIASTOLIC BLOOD PRESSURE: 73 MMHG | RESPIRATION RATE: 18 BRPM | BODY MASS INDEX: 29.34 KG/M2 | WEIGHT: 186.94 LBS | HEART RATE: 66 BPM | SYSTOLIC BLOOD PRESSURE: 129 MMHG | HEIGHT: 67 IN | TEMPERATURE: 98 F

## 2023-11-03 PROBLEM — Z71.89 GOALS OF CARE, COUNSELING/DISCUSSION: Status: ACTIVE | Noted: 2023-11-03

## 2023-11-03 LAB
ALBUMIN SERPL BCP-MCNC: 3.9 G/DL (ref 3.5–5.2)
ALP SERPL-CCNC: 77 U/L (ref 55–135)
ALT SERPL W/O P-5'-P-CCNC: 19 U/L (ref 10–44)
ANION GAP SERPL CALC-SCNC: 6 MMOL/L (ref 8–16)
AST SERPL-CCNC: 16 U/L (ref 10–40)
BASOPHILS # BLD AUTO: 0.04 K/UL (ref 0–0.2)
BASOPHILS NFR BLD: 0.4 % (ref 0–1.9)
BILIRUB SERPL-MCNC: 0.6 MG/DL (ref 0.1–1)
BUN SERPL-MCNC: 23 MG/DL (ref 8–23)
CALCIUM SERPL-MCNC: 8.9 MG/DL (ref 8.7–10.5)
CHLORIDE SERPL-SCNC: 106 MMOL/L (ref 95–110)
CO2 SERPL-SCNC: 25 MMOL/L (ref 23–29)
CREAT SERPL-MCNC: 0.8 MG/DL (ref 0.5–1.4)
DIFFERENTIAL METHOD: NORMAL
EOSINOPHIL # BLD AUTO: 0.3 K/UL (ref 0–0.5)
EOSINOPHIL NFR BLD: 2.9 % (ref 0–8)
ERYTHROCYTE [DISTWIDTH] IN BLOOD BY AUTOMATED COUNT: 13.7 % (ref 11.5–14.5)
EST. GFR  (NO RACE VARIABLE): >60 ML/MIN/1.73 M^2
GLUCOSE SERPL-MCNC: 81 MG/DL (ref 70–110)
HCT VFR BLD AUTO: 43.1 % (ref 40–54)
HGB BLD-MCNC: 14.4 G/DL (ref 14–18)
IMM GRANULOCYTES # BLD AUTO: 0.03 K/UL (ref 0–0.04)
IMM GRANULOCYTES NFR BLD AUTO: 0.3 % (ref 0–0.5)
LYMPHOCYTES # BLD AUTO: 2.1 K/UL (ref 1–4.8)
LYMPHOCYTES NFR BLD: 22.8 % (ref 18–48)
MCH RBC QN AUTO: 30.8 PG (ref 27–31)
MCHC RBC AUTO-ENTMCNC: 33.4 G/DL (ref 32–36)
MCV RBC AUTO: 92 FL (ref 82–98)
MONOCYTES # BLD AUTO: 1 K/UL (ref 0.3–1)
MONOCYTES NFR BLD: 10.8 % (ref 4–15)
NEUTROPHILS # BLD AUTO: 5.7 K/UL (ref 1.8–7.7)
NEUTROPHILS NFR BLD: 62.8 % (ref 38–73)
NRBC BLD-RTO: 0 /100 WBC
PLATELET # BLD AUTO: 276 K/UL (ref 150–450)
PMV BLD AUTO: 10.6 FL (ref 9.2–12.9)
POTASSIUM SERPL-SCNC: 3.9 MMOL/L (ref 3.5–5.1)
PROT SERPL-MCNC: 6.8 G/DL (ref 6–8.4)
RBC # BLD AUTO: 4.68 M/UL (ref 4.6–6.2)
SODIUM SERPL-SCNC: 137 MMOL/L (ref 136–145)
WBC # BLD AUTO: 9 K/UL (ref 3.9–12.7)

## 2023-11-03 PROCEDURE — 99223 1ST HOSP IP/OBS HIGH 75: CPT | Mod: ,,, | Performed by: INTERNAL MEDICINE

## 2023-11-03 PROCEDURE — 25000003 PHARM REV CODE 250: Performed by: STUDENT IN AN ORGANIZED HEALTH CARE EDUCATION/TRAINING PROGRAM

## 2023-11-03 PROCEDURE — 80053 COMPREHEN METABOLIC PANEL: CPT | Performed by: STUDENT IN AN ORGANIZED HEALTH CARE EDUCATION/TRAINING PROGRAM

## 2023-11-03 PROCEDURE — 85025 COMPLETE CBC W/AUTO DIFF WBC: CPT | Performed by: STUDENT IN AN ORGANIZED HEALTH CARE EDUCATION/TRAINING PROGRAM

## 2023-11-03 PROCEDURE — G0316 PR PROLONG INPT EVAL EA ADDL 15 MIN: ICD-10-PCS | Mod: ,,, | Performed by: INTERNAL MEDICINE

## 2023-11-03 PROCEDURE — G0316 PR PROLONG INPT EVAL EA ADDL 15 MIN: HCPCS | Mod: ,,, | Performed by: INTERNAL MEDICINE

## 2023-11-03 PROCEDURE — 99223 PR INITIAL HOSPITAL CARE,LEVL III: ICD-10-PCS | Mod: ,,, | Performed by: INTERNAL MEDICINE

## 2023-11-03 PROCEDURE — 36415 COLL VENOUS BLD VENIPUNCTURE: CPT | Performed by: STUDENT IN AN ORGANIZED HEALTH CARE EDUCATION/TRAINING PROGRAM

## 2023-11-03 RX ADMIN — CHOLECALCIFEROL (VITAMIN D3) 10 MCG (400 UNIT) TABLET 4800 UNITS: at 09:11

## 2023-11-03 RX ADMIN — SENNOSIDES AND DOCUSATE SODIUM 1 TABLET: 50; 8.6 TABLET ORAL at 09:11

## 2023-11-03 NOTE — DISCHARGE SUMMARY
[facility] Medicine  Discharge Summary      Patient Name: Amilcar Phillip  MRN: [id number]  NIKIA: [id number]  Patient Class: IP- Inpatient  Admission Date: 10/31/2023  Hospital Length of Stay: 2 days  Discharge Date and Time:  11/03/2023 4:39 PM  Attending Physician: Thom Carlos MD   Discharging Provider: Thom Carlos MD  Primary Care Provider: Angel Edwards Jr., MD    Primary Care Team: Networked reference to record PCT     HPI:   Amilcar Phillip is a 78 y.o.  male with known history of HTN, dementia, vit d deficiency  who presents after an episode of syncope of near syncope.  He has a history of dementia so most of his history is from his caretaker who is present in the ER.  She states he mentioned feeling dizzy, then while seated at a table he had an episode where he seemed to almost lose consciousness, with his eyes rolling back for a brief period of less than a minute.  She also notes that he looked vary pale and diaphoretic, he also had associated shortness of breath.  She is not aware of any similar episodes in the past, although there have been previous admissions where he was found down at home.  He lives in an assisted living facility.  His only med change was his PCP stopping an unknown blood pressure medication recently due to his bp being controlled or low while taking it.  On arrival to ER his HR is 50.  No beta blockers or other rate controlling meds on home med list.  Patient denies chest pain, palpitations, abdominal pain, nausea, vomiting, diarrhea, hematochezia, hematemesis, fever, cough, congestion, runny nose, hearing, numbness, tingling, headache, focal weakness.  History was obtained from the  caregiver present at the bedside and ER physician Sign-out.       * No surgery found *      Hospital Course:   Patient admitted after a syncopal event.  Cardiology consulted.  Echo completed with results down below.  Carotid ultrasound without significant  stenosis.    Left Ventricle: The left ventricle is normal in size. Normal wall thickness. Normal wall motion. There is normal systolic function with a visually estimated ejection fraction of 65 - 70%. There is normal diastolic function.    Right Ventricle: Normal right ventricular cavity size. There is moderate hypertrophy. Right ventricle wall motion  is normal. Systolic function is normal.  The thickening / mass in the right ventricle along the lateral aspect of the wall, there appears to be mass like structure.  Unfortunately the images a poor and not adequate.    Aortic Valve: There is mild aortic valve sclerosis. Mildly calcified noncoronary cusp.    Mitral Valve: There is mild posterior mitral annular calcification present.    IVC/SVC: Normal venous pressure at 3 mmHg.    Consideration for MRI of the heart to further delineate the structures in the right ventricle as it would be the best modality for imaging the right heart and to rule out fatty infiltration of the right ventricle.    Palliative Care was consulted. Patient is DNR. Patient discharged home November 3rd.  Patient will follow up outpatient to initiate palliative care services.       Goals of Care Treatment Preferences:  Code Status: DNR          What is most important right now is to focus on avoiding the hospital, remaining as independent as possible, comfort and QOL .  Accordingly, we have decided that the best plan to meet the patient's goals includes continuing with treatment.      Consults:   Consults (From admission, onward)        Status Ordering Provider     Inpatient consult to Social Work/Case Management  Once        Provider:  (Not yet assigned)    FARIBA Jc     Inpatient consult to Palliative Care  Once        Provider:  Сергей Harris MD    Completed RICHARD STOREY     Inpatient consult to Cardiology  Once        Provider:  Cory Gatica MD    Completed GAVIN TORIBIO consult to case  management  Once        Provider:  (Not yet assigned)    Completed GAVIN TORIBIO          No new Assessment & Plan notes have been filed under this hospital service since the last note was generated.  Service: Hospital Medicine    Final Active Diagnoses:    Diagnosis Date Noted POA    PRINCIPAL PROBLEM:  Syncope [R55] 07/07/2022 Unknown    Goals of care, counseling/discussion [Z71.89] 11/03/2023 Not Applicable      Problems Resolved During this Admission:       Discharged Condition: good    Disposition: Another Health Care Inst*    Follow Up:    Patient Instructions:      Ambulatory referral/consult to Home Health   Standing Status: Future   Referral Priority: Routine Referral Type: Home Health   Referral Reason: Specialty Services Required   Requested Specialty: Home Health Services   Number of Visits Requested: 1       Significant Diagnostic Studies: N/A    Pending Diagnostic Studies:     None         Medications:  Reconciled Home Medications:      Medication List      CONTINUE taking these medications    acetaminophen 325 MG tablet  Commonly known as: TYLENOL  Take 2 tablets (650 mg total) by mouth every 6 (six) hours as needed for Pain.     carbidopa-levodopa  mg  mg per tablet  Commonly known as: SINEMET  Take 12 tablets by mouth As instructed. Take 2 tablets in the morning, 1 tablet at lunch and 1 at bedtime     cholecalciferol (vitamin D3) 125 mcg (5,000 unit) capsule  TAKE ONE CAPSULE BY MOUTH DAILY     donepeziL 10 MG tablet  Commonly known as: ARICEPT  Take 1 tablet (10 mg total) by mouth every evening.     meloxicam 15 MG tablet  Commonly known as: MOBIC  Take 15 mg by mouth once daily.     multivitamin per tablet  Commonly known as: THERAGRAN  Take 1 tablet by mouth once daily.     OXcarbazepine 150 MG Tab  Commonly known as: TRILEPTAL  TAKE ONE TABLET BY MOUTH EVERY MORNING and TAKE ONE TABLET BY MOUTH EVERY EVENING     rOPINIRole 0.5 MG tablet  Commonly known as: REQUIP  TAKE  ONE TABLET BY MOUTH EVERY EVENING     rosuvastatin 10 MG tablet  Commonly known as: CRESTOR  Take 1 tablet (10 mg total) by mouth once daily.     sertraline 50 MG tablet  Commonly known as: ZOLOFT  TAKE ONE TABLET BY MOUTH EVERY EVENING            Indwelling Lines/Drains at time of discharge:   Lines/Drains/Airways     None                 Time spent on the discharge of patient: 40 minutes         Thom Carlos MD  Department of Hospital Medicine  [facility]

## 2023-11-03 NOTE — NURSING
Nurses Note -- 4 Eyes      11/3/2023   10:04 AM      Skin assessed during: Admit      [x] No Altered Skin Integrity Present    []Prevention Measures Documented      [] Yes- Altered Skin Integrity Present or Discovered   [] LDA Added if Not in Epic (Describe Wound)   [] New Altered Skin Integrity was Present on Admit and Documented in LDA   [] Wound Image Taken    Wound Care Consulted? No    Attending Nurse:  Veronica Hernández LPN    Second RN/Staff Member:   Anastacio SandraRN

## 2023-11-03 NOTE — PLAN OF CARE
Cardiology Clinic Note  Reason for Visit: SOB/Fatigue    HPI:   Mr. Lira is a 63-yo patient of Dr. Castanon, with CKD (s/p nephrectomy August 2017), HTN, DMII (long term insulin use), persistent atrial fibrillation (s/p DCCV 2016) on flecainide and Eliquis.  He has been short of breath for several months.  His wife has a broken hip so he has not been able to get much help at home yet.  He is frustrated with how things have been the past week.  His symptoms have been getting worse over the past few weeks, his son says even worse now than 2 weeks ago.  He has dyspnea with exertion, fevers/chills, cough off and on, fatigue.  He denies chest pain.  He does take his lasix twice daily and he feels that he has adequate urine output with this.  He has had a few pounds of weight loss, about 6 pounds or so.  He sleeps on an incline because of orthopnea.     His son says that 3 months ago, he was able to walk around the house, walk around the grocery store, without complaint.      He has been seen multiple times recently for the same complaint, never by the same provider.  He was initially seen by Guadalupe Lovell/Dr. Nguyen on 1/29/18, where he had been complaining of 3 weeks of fatigue and JAMES.  Medical workup at that time showed stable anemia (Hb ~11), TSH WNL, high vitamin B12.  He was told to take an additional dose of lasix 40mg and follow up.  BNP at that time was 389.  He was then admitted in early February, observed and given IV lasix, and discharged February 3 by internal medicine team.  There is mention of fever to 102F but I do not see where he was given antibiotics or had a flu/respiratory viral panel.  He does say that he was told he was negative for the flu.  He was noted to have leukocytosis on CBC at that time with a neutrophilic predominance.  He was then seen in the ER last night for the same complaint.  BNP at that time 302, with total bilirubin up to 2.0, Cr 2.1, Na 133, and WBC 15 with neutrophilic   spoke to Pt's sister (Leia Barrientos (Sister) [phone] ([place])) concerning Pt's discharge transportation. Per Pt's sister, caregiver to transport Pt to [place] when discharged.       11/03/23 1234   Post-Acute Status   Discharge Delays None known at this time   Discharge Plan   Discharge Plan A Assisted Living   Discharge Plan B Assisted Living        predominance, lactate was normal and procalcitonin was 4 (elevated).  Hb 10.2 at that time.  CXR without acute abnormality.  His ABG showed O2 sat 95% and slight respiratory alkalosis. Blood cultures are NGTD.  He was referred for sleep study but has not been able to get this done yet. He does not have a D-dimer result.      ROS:    Constitution: Positive for fever or chills. Positive for fatigue. Positive for weight loss.   HENT: Negative for sore throat or headaches. Negative for rhinorrhea.  Eyes: Negative for blurred or double vision.   Cardiovascular: See above  Pulmonary: Positive for SOB, cough.   Gastrointestinal: Negative for abdominal pain. Negative for nausea/ vomiting.  : Negative for dysuria.   Neurological: Negative for focal weakness or sensory changes.  PMH:     Past Medical History:   Diagnosis Date    CHF (congestive heart failure)     Diabetes mellitus, type 2 2010    Hydronephrosis of left kidney     Hypertension     Non-functioning kidney     Followed by Dr. Silver Anderson    Obesity (BMI 30-39.9)     Unspecified disorder of kidney and ureter      Past Surgical History:   Procedure Laterality Date    arm surgery      CARDIOVERSION  11/29/2016    kidney removed Left 0825/2017    knee surgeory N/A 4 to 5 years ago     Allergies:   Review of patient's allergies indicates:  No Known Allergies  Medications:     Current Outpatient Prescriptions on File Prior to Visit   Medication Sig Dispense Refill    amLODIPine (NORVASC) 10 MG tablet Take 1 tablet (10 mg total) by mouth once daily. 90 tablet 4    apixaban (ELIQUIS) 5 mg Tab Take 1 tablet (5 mg total) by mouth 2 (two) times daily. 60 tablet 11    blood sugar diagnostic Strp To check BG 3 times daily, to use with insurance preferred meter 100 each 11    blood sugar diagnostic Strp To check BG 3 times daily, to use with insurance preferred meter 100 strip 11    blood-glucose meter kit To check BG 3 times daily, to use with insurance  "preferred meter 1 each 1    blood-glucose meter kit To check BG 3 times daily, to use with insurance preferred meter 1 each 0    carvedilol (COREG) 25 MG tablet Take 1 tablet (25 mg total) by mouth 2 (two) times daily with meals. 360 tablet 3    doxazosin (CARDURA) 4 MG tablet Take 0.5 tablets (2 mg total) by mouth once daily. 15 tablet 6    ergocalciferol (ERGOCALCIFEROL) 50,000 unit Cap Take 1 capsule (50,000 Units total) by mouth every 7 days. 4 capsule 3    exenatide microspheres (BYDUREON) 2 mg/0.65 mL PnIj Inject 2 mg into the muscle once a week. 4 each 11    flecainide (TAMBOCOR) 50 MG Tab Take 1 tablet (50 mg total) by mouth every 12 (twelve) hours. 60 tablet 11    furosemide (LASIX) 40 MG tablet Take 1 tablet (40 mg total) by mouth 2 (two) times daily. 180 tablet 3    insulin detemir (LEVEMIR FLEXTOUCH) 100 unit/mL (3 mL) SubQ InPn pen INJECT 22 UNITS INTO THE SKIN EVERY EVENING 45 mL 4    insulin needles, disposable, (BD ULTRA-FINE RODRIGUEZ PEN NEEDLES) 32 x 5/32 " Ndle Patient injects insulin once a day. Please provide 3 month supply. 90 each 4    lancets Misc To check BG 3 times daily, to use with insurance preferred meter 100 each 11    lancets Misc To check BG 3 times daily, to use with insurance preferred meter 100 each 11    simvastatin (ZOCOR) 20 MG tablet Take 1 tablet (20 mg total) by mouth every evening. 90 tablet 4     No current facility-administered medications on file prior to visit.      Social History:     Social History   Substance Use Topics    Smoking status: Never Smoker    Smokeless tobacco: Never Used    Alcohol use No     Family History:     Family History   Problem Relation Age of Onset    Colon cancer Father     Ovarian cancer Sister     Liver cancer Brother     Heart disease Paternal Grandfather     Amblyopia Neg Hx     Blindness Neg Hx     Cataracts Neg Hx     Diabetes Neg Hx     Glaucoma Neg Hx     Hypertension Neg Hx     Macular degeneration Neg Hx     " "Retinal detachment Neg Hx     Strabismus Neg Hx     Stroke Neg Hx     Thyroid disease Neg Hx      Physical Exam:   BP (!) 122/57 (BP Location: Left arm, Patient Position: Sitting, BP Method: Large (Automatic))   Pulse 77   Ht 5' 10" (1.778 m)   Wt 122.9 kg (271 lb)   SpO2 96%   BMI 38.88 kg/m²      Constitutional: Moderate distress, diaphoretic  HEENT: Sclera anicteric, EOMI  Neck: No JVD  CV: RRR, no murmurs  Pulm: CTAB, no wheezes  GI: Abdomen soft, positive Andres sign  Extremities: No LE edema, warm  Skin: No ecchymosis, erythema, or ulcers  Psych: AOx3, appropriate affect  Neuro: No focal deficits    Labs:     Lab Results   Component Value Date     (L) 02/06/2018    K 4.3 02/06/2018    CL 99 02/06/2018    CO2 26 02/06/2018    BUN 34 (H) 02/06/2018    CREATININE 2.1 (H) 02/06/2018    ANIONGAP 8 02/06/2018     Lab Results   Component Value Date    AST 54 (H) 02/06/2018    ALT 43 02/06/2018    ALKPHOS 298 (H) 02/06/2018    BILITOT 2.0 (H) 02/06/2018    ALBUMIN 2.1 (L) 02/06/2018     Lab Results   Component Value Date    CALCIUM 8.4 (L) 02/06/2018    MG 2.1 02/06/2018    PHOS 2.4 (L) 02/06/2018     Lab Results   Component Value Date     (H) 02/06/2018     (H) 02/01/2018     (H) 12/06/2016    Lab Results   Component Value Date    WBC 14.99 (H) 02/06/2018    HGB 10.2 (L) 02/06/2018    HCT 30.6 (L) 02/06/2018     (H) 02/06/2018    GRAN 12.8 (H) 02/06/2018    GRAN 85.5 (H) 02/06/2018     Lab Results   Component Value Date    INR 1.2 02/01/2018     Lab Results   Component Value Date    CHOL 49 (L) 02/02/2018    HDL 17 (L) 02/02/2018    LDLCALC 21.0 (L) 02/02/2018    TRIG 55 02/02/2018     Lab Results   Component Value Date    HGBA1C 7.6 (H) 02/01/2018     Lab Results   Component Value Date    TSH 1.789 02/01/2018          Imaging:    TTE  1 - Concentric remodeling.     2 - No wall motion abnormalities.     3 - Normal left ventricular systolic function (EF 55-60%).     4 - " Biatrial enlargement.     5 - Enlarged aortic root.     6 - Normal left ventricular diastolic function.     7 - Right ventricular enlargement with normal systolic function.     8 - Trivial pericardial effusion.     9 - Intermediate central venous pressure.     EF   Date Value Ref Range Status   02/02/2018 55 55 - 65    01/18/2017 55 55 - 65    11/29/2016 35 (A) 55 - 65      PET 2/2017  CONCLUSIONS: NO CLINICALLY SIGNIFICANT STRESS INDUCED PERFUSION DEFECTS as assessed with PET perfusion imaging.  1. There is no evidence of a discrete hemodynamically significant coronary stenosis.   2. Although no clinically significant stress defect is seen, there is resting flow heterogeneity that improves after Dipyridamole. These results suggest mild endothelial dysfunction due to elevated cholesterol, high blood pressure and diabetes without   clinically significant, localized perfusion defects.   3. Resting wall motion is physiologic. Stress wall motion is physiologic.   4. LV function is normal at rest and stress.  (normal is >= 51%)  5. The ventricular volumes are normal at rest and stress.   6. The extracardiac distribution of radioactivity is normal.   7. There was no previous study available to compare.    EKG: Normal sinus rhythm, normal ecg  Assessment and Plan:   Mr. Lira is a 63-yo patient of Dr. Castanon who presents for hospital follow up.  He has several complaints, most pressing of which are fatigue, dyspnea on exertion, but also fevers/chills, weight loss from poor appetite, diaphoresis.  He is not hypoxic, CXR without abnormality.  He does have several lab abnormalities, including leukocytosis, anemia, hyperbilirubinemia, hyponatremia, ARTUR on CKD, elevated BNP (though within baseline), elevated procalcitonin.  He had a PET stress test 1 year ago without ischemia, denies chest pain.      Given his fevers/chills, leukocytosis, and positive Andres sign on exam, will get a HIDA scan and RUQ ultrasound to evaluate for  cholecystitis.  There is availability for this afternoon, if positive will send him to ER for further treatment.      His dyspnea may be somewhat related to the above, but it is unclear (he may be taking shallower breaths because of RUQ pain and not recognizing this).  On exam, he does not appear to be overloaded, however some labs evidence that he is.  Pending the workup for cholecystitis as above, would like to arrange for a RHC to evaluate his cardiac pressures or CPX to delineate cardiac vs pulmonary etiology and further work this up.  His E/e' on echocardiogram was normal, no PA pressure is reported but RA pressure is moderately elevated with a slightly enlarged right heart.  PE is of consideration, no CTA given his kidney function but may need to further evaluate with V/Q scan is this is persistent.  JAMES can be an ischemic equivalent, but he had no ischemia on PET from <1 year ago and he denies chest pain.  Could consider ischemic workup if the above is unrevealing.      Fever, positive Andres sign, Leukocytosis  - Concern for acute cholecystitis  - Will order HIDA scan and RUQ ultrasound as above  - If further evidence of cholecystitis, will need to admit him for antibiotics and further treatment    Dyspnea on Exertion  - Unclear etiology at this time. Some lab features of heart failure however clinically does not look overloaded, and BNP within his baseline.   - Would evaluate further with V/Q and RHC/ or CPX after workup for cholecystitis    HTN  - Well controlled  - Continue current regimen    AF  - Continue flecainide, Eliquis, carvedilol  - May need to hold Eliquis if procedure needed  - Currently in sinus    Mr. Lira and his son have my card if he were to feel worse.  He does not want to be admitted at this time, but understands that this may be dependent on the results of tests as above.     Follow up: 1-2 weeks pending the above workup    Signed:  Easton Cole MD  Cardiology Fellow, PGY-6  2/7/2018  11:23 AM    Follow-up:   Future Appointments  Date Time Provider Department Center   2/16/2018 7:00 AM LAB, SAME DAY [facility] IM [address] [facility]   2/16/2018 7:20 AM LAB, SAME DAY [facility] [address] [facility]   2/16/2018 11:00 AM Lisa Capone MD [facility] IM [address] [facility]   2/20/2018 2:00 PM Tena Phillips MD [facility] ENDOCRN [address]   2/26/2018 8:20 AM Silver Anderson MD [facility] [place]   3/7/2018 8:00 AM Lisa Capone MD [facility] IM [address] [facility]

## 2023-11-03 NOTE — HOSPITAL COURSE
Patient admitted after a syncopal event.  Cardiology consulted.  Echo completed with results down below.  Carotid ultrasound without significant stenosis.    Left Ventricle: The left ventricle is normal in size. Normal wall thickness. Normal wall motion. There is normal systolic function with a visually estimated ejection fraction of 65 - 70%. There is normal diastolic function.    Right Ventricle: Normal right ventricular cavity size. There is moderate hypertrophy. Right ventricle wall motion  is normal. Systolic function is normal.  The thickening / mass in the right ventricle along the lateral aspect of the wall, there appears to be mass like structure.  Unfortunately the images a poor and not adequate.    Aortic Valve: There is mild aortic valve sclerosis. Mildly calcified noncoronary cusp.    Mitral Valve: There is mild posterior mitral annular calcification present.    IVC/SVC: Normal venous pressure at 3 mmHg.    Consideration for MRI of the heart to further delineate the structures in the right ventricle as it would be the best modality for imaging the right heart and to rule out fatty infiltration of the right ventricle.    Palliative Care was consulted. Patient is DNR. Patient discharged home November 3rd.  Patient will follow up outpatient to initiate palliative care services.

## 2023-11-03 NOTE — CONSULTS
[facility]  Palliative Medicine  Consult Note    Patient Name: Amilcar Phillip  MRN: [id number]  Admission Date: 10/31/2023  Hospital Length of Stay: 2 days  Code Status: DNR   Attending Provider: Thom Carlos MD  Consulting Provider: Сергей Harris MD  Primary Care Physician: Angel Edwards Jr., MD  Principal Problem:Syncope    Patient information was obtained from patient, relative(s), past medical records and primary team.      Inpatient consult to Palliative Care  Consult performed by: Сергей Harris MD  Consult ordered by: Thom Carlos MD        Assessment/Plan:     Palliative Care  Goals of care, counseling/discussion  I reviewed his chart and discussed his case with his team.      I examined Mr. Phillip at bedside.  He lacks capacity for complex medical decision-making.  I spoke with the sister by phone.      I introduced myself and my role as palliative care physician.  She was agreeable to speaking.      We discussed his medical illness, prognosis, and values in detail.      Briefly, she understands that he has Parkinson's complicated by dementia.  She understands that he is currently admitted and being treated for what appears to be a tachy/Sunday arrhythmia.  Cardiology has recommended pacemaker placement which she nor he desire.    We discussed his prognosis.  I explained if he continues on his current trajectory his time might be best estimate in terms of weeks to months.  Also explained if he is able to get back on his feet, remain functional, and maintain nutrition then he might live many years.    We discussed his values.  He is not living a great quality of life at baseline, but is still able to find valarie in life.  He does not desire repeat hospitalization.  He is not interested in invasive procedures.  He was willing to continue with physical therapy in hopes of regaining his function.  He otherwise does not have any worries or fears.  He does not fear dying.    We  "spoke back and forth.  She was a very accurate understanding of his current situation.      Ultimately, I recommended that we discharge him back to his previous place of living.  I recommended the support of outpatient palliative services.  She understood and agreed.  I explained if his condition improves then she can certainly keep on with a palliative services.  If his condition is not improving or worsening then outpatient palliative can help make the transition over to hospice.  She understood and is in total agreement with this plan.      We also took a moment to discuss code status.  He desires a DNR code status.  I have placed this order.      I updated his primary case management team.      I appreciate being involved.  I hope I have been helpful.        Thank you for your consult. I will sign off. Please contact us if you have any additional questions.    Subjective:     HPI:   Per admit H&P: "Amilcar Phillip is a 78 y.o.  male with known history of HTN, dementia, vit d deficiency  who presents after an episode of syncope of near syncope.  He has a history of dementia so most of his history is from his caretaker who is present in the ER.  She states he mentioned feeling dizzy, then while seated at a table he had an episode where he seemed to almost lose consciousness, with his eyes rolling back for a brief period of less than a minute.  She also notes that he looked vary pale and diaphoretic, he also had associated shortness of breath.  She is not aware of any similar episodes in the past, although there have been previous admissions where he was found down at home.  He lives in an assisted living facility.  His only med change was his PCP stopping an unknown blood pressure medication recently due to his bp being controlled or low while taking it.  On arrival to ER his HR is 50.  No beta blockers or other rate controlling meds on home med list.  Patient denies chest pain, palpitations, abdominal pain, " "nausea, vomiting, diarrhea, hematochezia, hematemesis, fever, cough, congestion, runny nose, hearing, numbness, tingling, headache, focal weakness.  History was obtained from the  caregiver present at the bedside and ER physician Sign-out."    Cardiology has been evaluated due to tachy/Sunday arrhythmias.  Considering pacemaker placement.  Cardiology has recommended palliative care evaluation.  At this point he carries a guarded prognosis.  I have been asked to assist with goals of care.      Hospital Course:  No notes on file    Interval History: See HPI    Past Medical History:   Diagnosis Date    Dementia with behavioral disturbance 2021    Hypercholesteremia     Hypertension     Progressive supranuclear ophthalmoplegia     Vitamin D deficiency        Past Surgical History:   Procedure Laterality Date    COLONOSCOPY      HERNIA REPAIR         Review of patient's allergies indicates:  No Known Allergies    Medications:  Continuous Infusions:  Scheduled Meds:   atorvastatin  40 mg Oral Daily    cholecalciferol (vitamin D3)  4,800 Units Oral Daily    enoxparin  40 mg Subcutaneous Daily    rOPINIRole  0.5 mg Oral QHS    senna-docusate 8.6-50 mg  1 tablet Oral BID    sertraline  50 mg Oral QHS     PRN Meds:acetaminophen, acetaminophen, melatonin, ondansetron, sodium chloride 0.9%    Family History       Problem Relation (Age of Onset)    Osteoporosis Mother    Stroke Father    Vaginal cancer Mother          Tobacco Use    Smoking status: Former     Current packs/day: 0.00     Types: Cigarettes, Cigars     Quit date:      Years since quittin.8    Smokeless tobacco: Never   Substance and Sexual Activity    Alcohol use: Yes    Drug use: Never    Sexual activity: Not Currently       Review of Systems   Unable to perform ROS: Dementia     Objective:     Vital Signs (Most Recent):  Temp: 98 °F (36.7 °C) (23 1555)  Pulse: 66 (23 1555)  Resp: 18 (23 1555)  BP: 129/73 (23 " 1555)  SpO2: 95 % (11/03/23 1555) Vital Signs (24h Range):  Temp:  [97.5 °F (36.4 °C)-98.2 °F (36.8 °C)] 98 °F (36.7 °C)  Pulse:  [62-81] 66  Resp:  [16-18] 18  SpO2:  [94 %-97 %] 95 %  BP: (115-154)/(72-84) 129/73     Weight: 84.8 kg (186 lb 15.2 oz)  Body mass index is 29.28 kg/m².       Physical Exam  Constitutional:       General: He is not in acute distress.     Appearance: He is ill-appearing. He is not toxic-appearing.   HENT:      Head: Normocephalic.      Right Ear: External ear normal.      Left Ear: External ear normal.      Nose: Nose normal. No congestion.      Mouth/Throat:      Pharynx: No oropharyngeal exudate.   Eyes:      General:         Right eye: No discharge.         Left eye: No discharge.   Pulmonary:      Effort: Pulmonary effort is normal. No respiratory distress.   Abdominal:      General: There is no distension.      Palpations: Abdomen is soft.      Tenderness: There is no abdominal tenderness.   Neurological:      Mental Status: He is alert. Mental status is at baseline.      Comments: Per sitter at bedside            Review of Symptoms      Symptom Assessment (ESAS 0-10 Scale)  Unable to complete assessment due to Dementia         Pain Assessment in Advanced Demential Scale (PAINAD)   Breathing - Independent of vocalization:  0  Negative vocalization:  0  Facial expression:  0  Body language:  0  Consolability:  0  Total:  0    Performance Status:  50    Living Arrangements:  Lives in assisted living    Psychosocial/Cultural:   See Palliative Psychosocial Note: No  **Primary  to Follow**  Palliative Care  Consult: No        Advance Care Planning  Advance Directives:   Do Not Resuscitate Status: Yes      Decision Making:  Family answered questions and Patient unable to communicate due to disease severity/cognitive impairment  Goals of Care: The family endorses that what is most important right now is to focus on avoiding the hospital, remaining as independent  as possible, and comfort and QOL     Accordingly, we have decided that the best plan to meet the patient's goals includes continuing with treatment and transitioning to hospice if his condition does not improve.         Significant Labs: BMP:   Recent Labs   Lab 11/03/23 0515   GLU 81      K 3.9      CO2 25   BUN 23   CREATININE 0.8   CALCIUM 8.9     CBC:   Recent Labs   Lab 11/02/23  0621 11/03/23 0515   WBC 9.51 9.00   HGB 14.5 14.4   HCT 43.9 43.1    276     CBC:   Recent Labs   Lab 11/03/23 0515   WBC 9.00   HGB 14.4   HCT 43.1   MCV 92        BMP:  Recent Labs   Lab 11/03/23 0515   GLU 81      K 3.9      CO2 25   BUN 23   CREATININE 0.8   CALCIUM 8.9     LFT:  Lab Results   Component Value Date    AST 16 11/03/2023    ALKPHOS 77 11/03/2023    BILITOT 0.6 11/03/2023     Albumin:   Albumin   Date Value Ref Range Status   11/03/2023 3.9 3.5 - 5.2 g/dL Final     Protein:   Total Protein   Date Value Ref Range Status   11/03/2023 6.8 6.0 - 8.4 g/dL Final     Lactic acid:   Lab Results   Component Value Date    LACTATE 1.1 07/07/2022       Significant Imaging: I have reviewed all pertinent imaging results/findings within the past 24 hours.        I spent a total of 90 minutes on the day of the visit. This includes face to face time in discussion of goals of care, symptom assessment, coordination of care and emotional support.  This also includes non-face to face time preparing to see the patient (eg, review of tests/imaging), obtaining and/or reviewing separately obtained history, documenting clinical information in the electronic or other health record, independently interpreting results and communicating results to the patient/family/caregiver, or care coordinator.    Сергей Harris MD  Palliative Medicine  [facility]

## 2023-11-03 NOTE — PLAN OF CARE
Problem: Adult Inpatient Plan of Care  Goal: Plan of Care Review  Outcome: Met  Goal: Patient-Specific Goal (Individualized)  Outcome: Met  Goal: Absence of Hospital-Acquired Illness or Injury  Outcome: Met  Goal: Optimal Comfort and Wellbeing  Outcome: Met  Goal: Readiness for Transition of Care  Outcome: Met     Problem: Skin Injury Risk Increased  Goal: Skin Health and Integrity  Outcome: Met     Problem: Coping Ineffective  Goal: Effective Coping  Outcome: Met

## 2023-11-03 NOTE — SUBJECTIVE & OBJECTIVE
Interval History: See HPI    Past Medical History:   Diagnosis Date    Dementia with behavioral disturbance 2021    Hypercholesteremia     Hypertension     Progressive supranuclear ophthalmoplegia     Vitamin D deficiency        Past Surgical History:   Procedure Laterality Date    COLONOSCOPY      HERNIA REPAIR         Review of patient's allergies indicates:  No Known Allergies    Medications:  Continuous Infusions:  Scheduled Meds:   atorvastatin  40 mg Oral Daily    cholecalciferol (vitamin D3)  4,800 Units Oral Daily    enoxparin  40 mg Subcutaneous Daily    rOPINIRole  0.5 mg Oral QHS    senna-docusate 8.6-50 mg  1 tablet Oral BID    sertraline  50 mg Oral QHS     PRN Meds:acetaminophen, acetaminophen, melatonin, ondansetron, sodium chloride 0.9%    Family History       Problem Relation (Age of Onset)    Osteoporosis Mother    Stroke Father    Vaginal cancer Mother          Tobacco Use    Smoking status: Former     Current packs/day: 0.00     Types: Cigarettes, Cigars     Quit date:      Years since quittin.8    Smokeless tobacco: Never   Substance and Sexual Activity    Alcohol use: Yes    Drug use: Never    Sexual activity: Not Currently       Review of Systems   Unable to perform ROS: Dementia     Objective:     Vital Signs (Most Recent):  Temp: 98 °F (36.7 °C) (23 1555)  Pulse: 66 (23 1555)  Resp: 18 (23 1555)  BP: 129/73 (23 1555)  SpO2: 95 % (23 1555) Vital Signs (24h Range):  Temp:  [97.5 °F (36.4 °C)-98.2 °F (36.8 °C)] 98 °F (36.7 °C)  Pulse:  [62-81] 66  Resp:  [16-18] 18  SpO2:  [94 %-97 %] 95 %  BP: (115-154)/(72-84) 129/73     Weight: 84.8 kg (186 lb 15.2 oz)  Body mass index is 29.28 kg/m².       Physical Exam  Constitutional:       General: He is not in acute distress.     Appearance: He is ill-appearing. He is not toxic-appearing.   HENT:      Head: Normocephalic.      Right Ear: External ear normal.      Left Ear: External ear normal.      Nose: Nose  normal. No congestion.      Mouth/Throat:      Pharynx: No oropharyngeal exudate.   Eyes:      General:         Right eye: No discharge.         Left eye: No discharge.   Pulmonary:      Effort: Pulmonary effort is normal. No respiratory distress.   Abdominal:      General: There is no distension.      Palpations: Abdomen is soft.      Tenderness: There is no abdominal tenderness.   Neurological:      Mental Status: He is alert. Mental status is at baseline.      Comments: Per sitter at bedside            Review of Symptoms      Symptom Assessment (ESAS 0-10 Scale)  Unable to complete assessment due to Dementia         Pain Assessment in Advanced Demential Scale (PAINAD)   Breathing - Independent of vocalization:  0  Negative vocalization:  0  Facial expression:  0  Body language:  0  Consolability:  0  Total:  0    Performance Status:  50    Living Arrangements:  Lives in assisted living    Psychosocial/Cultural:   See Palliative Psychosocial Note: No  **Primary  to Follow**  Palliative Care  Consult: No        Advance Care Planning   Advance Directives:   Do Not Resuscitate Status: Yes      Decision Making:  Family answered questions and Patient unable to communicate due to disease severity/cognitive impairment  Goals of Care: The family endorses that what is most important right now is to focus on avoiding the hospital, remaining as independent as possible, and comfort and QOL     Accordingly, we have decided that the best plan to meet the patient's goals includes continuing with treatment and transitioning to hospice if his condition does not improve.         Significant Labs: BMP:   Recent Labs   Lab 11/03/23 0515   GLU 81      K 3.9      CO2 25   BUN 23   CREATININE 0.8   CALCIUM 8.9     CBC:   Recent Labs   Lab 11/02/23  0621 11/03/23 0515   WBC 9.51 9.00   HGB 14.5 14.4   HCT 43.9 43.1    276     CBC:   Recent Labs   Lab 11/03/23 0515   WBC 9.00   HGB 14.4    HCT 43.1   MCV 92        BMP:  Recent Labs   Lab 11/03/23  0515   GLU 81      K 3.9      CO2 25   BUN 23   CREATININE 0.8   CALCIUM 8.9     LFT:  Lab Results   Component Value Date    AST 16 11/03/2023    ALKPHOS 77 11/03/2023    BILITOT 0.6 11/03/2023     Albumin:   Albumin   Date Value Ref Range Status   11/03/2023 3.9 3.5 - 5.2 g/dL Final     Protein:   Total Protein   Date Value Ref Range Status   11/03/2023 6.8 6.0 - 8.4 g/dL Final     Lactic acid:   Lab Results   Component Value Date    LACTATE 1.1 07/07/2022       Significant Imaging: I have reviewed all pertinent imaging results/findings within the past 24 hours.

## 2023-11-03 NOTE — ASSESSMENT & PLAN NOTE
I reviewed his chart and discussed his case with his team.      I examined Mr. Phillip at bedside.  He lacks capacity for complex medical decision-making.  I spoke with the sister by phone.      I introduced myself and my role as palliative care physician.  She was agreeable to speaking.      We discussed his medical illness, prognosis, and values in detail.      Briefly, she understands that he has Parkinson's complicated by dementia.  She understands that he is currently admitted and being treated for what appears to be a tachy/Sunday arrhythmia.  Cardiology has recommended pacemaker placement which she nor he desire.    We discussed his prognosis.  I explained if he continues on his current trajectory his time might be best estimate in terms of weeks to months.  Also explained if he is able to get back on his feet, remain functional, and maintain nutrition then he might live many years.    We discussed his values.  He is not living a great quality of life at baseline, but is still able to find valarie in life.  He does not desire repeat hospitalization.  He is not interested in invasive procedures.  He was willing to continue with physical therapy in hopes of regaining his function.  He otherwise does not have any worries or fears.  He does not fear dying.    We spoke back and forth.  She was a very accurate understanding of his current situation.      Ultimately, I recommended that we discharge him back to his previous place of living.  I recommended the support of outpatient palliative services.  She understood and agreed.  I explained if his condition improves then she can certainly keep on with a palliative services.  If his condition is not improving or worsening then outpatient palliative can help make the transition over to hospice.  She understood and is in total agreement with this plan.      We also took a moment to discuss code status.  He desires a DNR code status.  I have placed this order.      I updated  his primary case management team.      I appreciate being involved.  I hope I have been helpful.

## 2023-11-03 NOTE — HPI
"Per admit H&P: "Amilcar Phillip is a 78 y.o.  male with known history of HTN, dementia, vit d deficiency  who presents after an episode of syncope of near syncope.  He has a history of dementia so most of his history is from his caretaker who is present in the ER.  She states he mentioned feeling dizzy, then while seated at a table he had an episode where he seemed to almost lose consciousness, with his eyes rolling back for a brief period of less than a minute.  She also notes that he looked vary pale and diaphoretic, he also had associated shortness of breath.  She is not aware of any similar episodes in the past, although there have been previous admissions where he was found down at home.  He lives in an assisted living facility.  His only med change was his PCP stopping an unknown blood pressure medication recently due to his bp being controlled or low while taking it.  On arrival to ER his HR is 50.  No beta blockers or other rate controlling meds on home med list.  Patient denies chest pain, palpitations, abdominal pain, nausea, vomiting, diarrhea, hematochezia, hematemesis, fever, cough, congestion, runny nose, hearing, numbness, tingling, headache, focal weakness.  History was obtained from the  caregiver present at the bedside and ER physician Sign-out."    Cardiology has been evaluated due to tachy/Sunday arrhythmias.  Considering pacemaker placement.  Cardiology has recommended palliative care evaluation.  At this point he carries a guarded prognosis.  I have been asked to assist with goals of care.  "

## 2023-11-03 NOTE — NURSING
Discharge information given to caretaker. IV discontinued, patient tolerated well. Patient discharged via wheelchair to personal vehicle.

## 2023-11-03 NOTE — PLAN OF CARE
Per chart review, patient lives at [facility] but also has home health with [facility].  Resumption order sent to [facility] via [employer].        11/03/23 4967   Post-Acute Status   Post-Acute Authorization Home Health   Home Health Status Referrals Sent   Discharge Plan   Discharge Plan A Home Health   Discharge Plan B Assisted Living

## 2023-11-03 NOTE — HPI
Amilcar Phillip is a 78 y.o.  male with known history of HTN, dementia, vit d deficiency  who presents after an episode of syncope of near syncope.  He has a history of dementia so most of his history is from his caretaker who is present in the ER.  She states he mentioned feeling dizzy, then while seated at a table he had an episode where he seemed to almost lose consciousness, with his eyes rolling back for a brief period of less than a minute.  She also notes that he looked vary pale and diaphoretic, he also had associated shortness of breath.  She is not aware of any similar episodes in the past, although there have been previous admissions where he was found down at home.  He lives in an assisted living facility.  His only med change was his PCP stopping an unknown blood pressure medication recently due to his bp being controlled or low while taking it.  On arrival to ER his HR is 50.  No beta blockers or other rate controlling meds on home med list.  Patient denies chest pain, palpitations, abdominal pain, nausea, vomiting, diarrhea, hematochezia, hematemesis, fever, cough, congestion, runny nose, hearing, numbness, tingling, headache, focal weakness.  History was obtained from the  caregiver present at the bedside and ER physician Sign-out.

## 2023-11-03 NOTE — PLAN OF CARE
Patient cleared for discharge from case management standpoint.     observed palliative spoke to Anastacio ([facility]) who informed  [facility] has a partnership with [facility].  sent referral to [facility]. Per Indira ([facility]) Pt's sister to be contacted to initiate Palliative services.    Pt to be seen by [facility] 11/4/2023.    Chart and discharge orders reviewed.  Patient discharged home to [place]  with no further case management needs.       11/03/23 1616   Final Note   Assessment Type Final Discharge Note   Anticipated Discharge Disposition Home-Health   What phone number can be called within the next 1-3 days to see how you are doing after discharge? [phone]   Hospital Resources/Appts/Education Provided Provided patient/caregiver with written discharge plan information;Post-Acute resouces added to AVS;Appointments scheduled and added to AVS   Post-Acute Status   Coverage Payor:  MEDICARE - MEDICARE PART A & B   Discharge Delays None known at this time

## 2023-11-09 PROCEDURE — G0179 PR HOME HEALTH MD RECERTIFICATION: ICD-10-PCS | Mod: ,,, | Performed by: INTERNAL MEDICINE

## 2023-11-09 PROCEDURE — G0179 MD RECERTIFICATION HHA PT: HCPCS | Mod: ,,, | Performed by: INTERNAL MEDICINE

## 2023-11-15 ENCOUNTER — DOCUMENT SCAN (OUTPATIENT)
Dept: HOME HEALTH SERVICES | Facility: HOSPITAL | Age: 78
End: 2023-11-15
Payer: MEDICARE

## 2023-11-16 ENCOUNTER — DOCUMENT SCAN (OUTPATIENT)
Dept: HOME HEALTH SERVICES | Facility: HOSPITAL | Age: 78
End: 2023-11-16
Payer: MEDICARE

## 2023-11-16 ENCOUNTER — EXTERNAL HOME HEALTH (OUTPATIENT)
Dept: HOME HEALTH SERVICES | Facility: HOSPITAL | Age: 78
End: 2023-11-16
Payer: MEDICARE

## 2023-12-11 ENCOUNTER — OFFICE VISIT (OUTPATIENT)
Dept: FAMILY MEDICINE | Facility: CLINIC | Age: 78
End: 2023-12-11
Payer: MEDICARE

## 2023-12-11 VITALS
HEART RATE: 68 BPM | BODY MASS INDEX: 28.56 KG/M2 | DIASTOLIC BLOOD PRESSURE: 78 MMHG | RESPIRATION RATE: 16 BRPM | SYSTOLIC BLOOD PRESSURE: 132 MMHG | HEIGHT: 67 IN | OXYGEN SATURATION: 98 % | WEIGHT: 182 LBS

## 2023-12-11 DIAGNOSIS — I10 PRIMARY HYPERTENSION: Primary | ICD-10-CM

## 2023-12-11 DIAGNOSIS — Z71.89 ADVANCED CARE PLANNING/COUNSELING DISCUSSION: ICD-10-CM

## 2023-12-11 PROCEDURE — 99213 OFFICE O/P EST LOW 20 MIN: CPT | Mod: S$GLB,,, | Performed by: INTERNAL MEDICINE

## 2023-12-11 PROCEDURE — 99213 PR OFFICE/OUTPT VISIT, EST, LEVL III, 20-29 MIN: ICD-10-PCS | Mod: S$GLB,,, | Performed by: INTERNAL MEDICINE

## 2023-12-11 NOTE — PROGRESS NOTES
Subjective:       Patient ID: Amilcar Phillip is a 78 y.o. male.    Chief Complaint: Follow-up    Here for routine follow up; last visit note, most recent available labs, and health maintenance topics reviewed.  No further reports of significant behavioral issues but cognition does continue to decline.   He lives in an assisted living facility; he has a sitter 8 hours per day and they also check on him in middle of night.   Needs to go over DNR paperwork    Hypertension  This is a chronic problem. The problem is controlled (not currently on any meds). Pertinent negatives include no anxiety, chest pain, headaches, malaise/fatigue, neck pain, palpitations, peripheral edema or shortness of breath. Past treatments include angiotensin blockers.     Review of Systems   Constitutional:  Negative for malaise/fatigue.   Respiratory:  Negative for shortness of breath.    Cardiovascular:  Negative for chest pain and palpitations.   Musculoskeletal:  Negative for neck pain.   Neurological:  Negative for headaches.       Past Medical History:   Diagnosis Date    Dementia with behavioral disturbance 2021    Hypercholesteremia     Hypertension     Progressive supranuclear ophthalmoplegia     Vitamin D deficiency       Past Surgical History:   Procedure Laterality Date    COLONOSCOPY      HERNIA REPAIR         Family History   Problem Relation Age of Onset    Osteoporosis Mother     Vaginal cancer Mother     Stroke Father        Social History     Socioeconomic History    Marital status:    Occupational History    Occupation: retired   Tobacco Use    Smoking status: Former     Current packs/day: 0.00     Types: Cigarettes, Cigars     Quit date:      Years since quittin.9    Smokeless tobacco: Never   Substance and Sexual Activity    Alcohol use: Yes    Drug use: Never    Sexual activity: Not Currently   Social History Narrative    Live in assistive living center     Social Determinants of Health  "    Stress: No Stress Concern Present (12/2/2019)    [employer] [place] of [employer]ational [employer] - Occupational Stress Questionnaire     Feeling of Stress : Not at all       Current Outpatient Medications   Medication Sig Dispense Refill    acetaminophen (TYLENOL) 325 MG tablet Take 2 tablets (650 mg total) by mouth every 6 (six) hours as needed for Pain.  0    carbidopa-levodopa  mg (SINEMET)  mg per tablet Take 12 tablets by mouth As instructed. Take 2 tablets in the morning, 1 tablet at lunch and 1 at bedtime      cholecalciferol, vitamin D3, 125 mcg (5,000 unit) capsule TAKE ONE CAPSULE BY MOUTH DAILY 90 capsule 1    donepeziL (ARICEPT) 10 MG tablet Take 1 tablet (10 mg total) by mouth every evening. 90 tablet 1    meloxicam (MOBIC) 15 MG tablet Take 15 mg by mouth once daily.      multivitamin (THERAGRAN) per tablet Take 1 tablet by mouth once daily.      OXcarbazepine (TRILEPTAL) 150 MG Tab TAKE ONE TABLET BY MOUTH EVERY MORNING and TAKE ONE TABLET BY MOUTH EVERY EVENING 180 tablet 1    rOPINIRole (REQUIP) 0.5 MG tablet TAKE ONE TABLET BY MOUTH EVERY EVENING 90 tablet 1    rosuvastatin (CRESTOR) 10 MG tablet Take 1 tablet (10 mg total) by mouth once daily. 90 tablet 1    sertraline (ZOLOFT) 50 MG tablet TAKE ONE TABLET BY MOUTH EVERY EVENING 90 tablet 1     No current facility-administered medications for this visit.       Review of patient's allergies indicates:  No Known Allergies  Objective:      Blood pressure 132/78, pulse 68, resp. rate 16, height 5' 7" (1.702 m), weight 82.6 kg (182 lb), SpO2 98 %. Body mass index is 28.51 kg/m².   Physical Exam  Vitals and nursing note reviewed.   Constitutional:       General: He is not in acute distress.     Appearance: He is well-developed. He is not ill-appearing, toxic-appearing or diaphoretic.   HENT:      Nose: Nose normal.   Eyes:      General: No scleral icterus.        Right eye: No discharge.         Left eye: No discharge.      Conjunctiva/sclera: " Conjunctivae normal.   Neck:      Vascular: No carotid bruit.   Cardiovascular:      Rate and Rhythm: Normal rate and regular rhythm.      Heart sounds: Normal heart sounds. No murmur heard.  Pulmonary:      Effort: Pulmonary effort is normal. No respiratory distress.      Breath sounds: Normal breath sounds. No decreased breath sounds, wheezing, rhonchi or rales.   Abdominal:      General: There is no distension.      Palpations: Abdomen is soft.      Tenderness: There is no abdominal tenderness. There is no guarding or rebound.   Genitourinary:     Rectum: Guaiac result negative. No anal fissure or external hemorrhoid.   Musculoskeletal:      Thoracic back: Deformity (kyphosis) present.      Comments: Crepitus knees   Skin:     General: Skin is warm and dry.   Neurological:      Mental Status: He is alert.      Cranial Nerves: No facial asymmetry.      Motor: No tremor.      Gait: Gait abnormal (ambulation with walker).   Psychiatric:         Speech: Speech normal.         Behavior: Behavior normal.         Cognition and Memory: Memory is impaired.             Assessment:       1. Primary hypertension    2. Advanced care planning/counseling discussion        Plan:       1. Primary hypertension  Comments:  Remains controlled off meds.    2. Advanced care planning/counseling discussion  Comments:  Reviewed [place] paperwork with his sister who is his PHCR.

## 2023-12-13 ENCOUNTER — OFFICE VISIT (OUTPATIENT)
Dept: ORTHOPEDICS | Facility: CLINIC | Age: 78
End: 2023-12-13
Payer: MEDICARE

## 2023-12-13 VITALS — BODY MASS INDEX: 28.56 KG/M2 | WEIGHT: 182 LBS | HEIGHT: 67 IN

## 2023-12-13 DIAGNOSIS — M17.11 PRIMARY OSTEOARTHRITIS OF RIGHT KNEE: Primary | ICD-10-CM

## 2023-12-13 PROCEDURE — 99213 PR OFFICE/OUTPT VISIT, EST, LEVL III, 20-29 MIN: ICD-10-PCS | Mod: 25,S$GLB,, | Performed by: PHYSICIAN ASSISTANT

## 2023-12-13 PROCEDURE — 99213 OFFICE O/P EST LOW 20 MIN: CPT | Mod: 25,S$GLB,, | Performed by: PHYSICIAN ASSISTANT

## 2023-12-13 PROCEDURE — 20610 LARGE JOINT ASPIRATION/INJECTION: R KNEE: ICD-10-PCS | Mod: RT,S$GLB,, | Performed by: PHYSICIAN ASSISTANT

## 2023-12-13 PROCEDURE — 20610 DRAIN/INJ JOINT/BURSA W/O US: CPT | Mod: RT,S$GLB,, | Performed by: PHYSICIAN ASSISTANT

## 2023-12-13 RX ORDER — TRIAMCINOLONE ACETONIDE 40 MG/ML
40 INJECTION, SUSPENSION INTRA-ARTICULAR; INTRAMUSCULAR
Status: DISCONTINUED | OUTPATIENT
Start: 2023-12-13 | End: 2023-12-13 | Stop reason: HOSPADM

## 2023-12-13 RX ADMIN — TRIAMCINOLONE ACETONIDE 40 MG: 40 INJECTION, SUSPENSION INTRA-ARTICULAR; INTRAMUSCULAR at 01:12

## 2023-12-13 NOTE — PROCEDURES
Large Joint Aspiration/Injection: R knee    Date/Time: 12/13/2023 1:15 PM    Performed by: Aldo Shane PA  Authorized by: Aldo Shane PA    Consent Done?:  Yes (Verbal)  Indications:  Arthritis and pain  Site marked: the procedure site was marked    Timeout: prior to procedure the correct patient, procedure, and site was verified    Prep: patient was prepped and draped in usual sterile fashion      Local anesthesia used?: Yes    Local anesthetic:  Lidocaine 1% without epinephrine    Details:  Needle Size:  22 G  Ultrasonic Guidance for needle placement?: No    Location:  Knee  Site:  R knee  Medications:  40 mg triamcinolone acetonide 40 mg/mL  Patient tolerance:  Patient tolerated the procedure well with no immediate complications

## 2023-12-13 NOTE — PROGRESS NOTES
[facility]  [address]  [place] [address]  Phone: [phone]   [phone]    Patient's PCP: Angel Edwards Jr., MD  Referring Provider: No ref. provider found    Subjective:      Chief Complaint:   Chief Complaint   Patient presents with    Left Knee - Pain     BL knee pain follow up. Received inj 08/08/23 which offered great relief as has PT    Right Knee - Pain     BL knee pain follow up. Received inj 08/08/23 which offered great relief as has PT. Requesting inj in right knee only       Past Medical History:   Diagnosis Date    Dementia with behavioral disturbance 12/23/2021    Hypercholesteremia     Hypertension     Progressive supranuclear ophthalmoplegia     Vitamin D deficiency        Past Surgical History:   Procedure Laterality Date    COLONOSCOPY      HERNIA REPAIR         Current Outpatient Medications   Medication Sig    acetaminophen (TYLENOL) 325 MG tablet Take 2 tablets (650 mg total) by mouth every 6 (six) hours as needed for Pain.    carbidopa-levodopa  mg (SINEMET)  mg per tablet Take 12 tablets by mouth As instructed. Take 2 tablets in the morning, 1 tablet at lunch and 1 at bedtime    cholecalciferol, vitamin D3, 125 mcg (5,000 unit) capsule TAKE ONE CAPSULE BY MOUTH DAILY    donepeziL (ARICEPT) 10 MG tablet Take 1 tablet (10 mg total) by mouth every evening.    meloxicam (MOBIC) 15 MG tablet Take 15 mg by mouth once daily.    multivitamin (THERAGRAN) per tablet Take 1 tablet by mouth once daily.    OXcarbazepine (TRILEPTAL) 150 MG Tab TAKE ONE TABLET BY MOUTH EVERY MORNING and TAKE ONE TABLET BY MOUTH EVERY EVENING    rOPINIRole (REQUIP) 0.5 MG tablet TAKE ONE TABLET BY MOUTH EVERY EVENING    rosuvastatin (CRESTOR) 10 MG tablet Take 1 tablet (10 mg total) by mouth once daily.    sertraline (ZOLOFT) 50 MG tablet TAKE ONE TABLET BY MOUTH EVERY EVENING     No current facility-administered medications for this visit.       Review of patient's  allergies indicates:  No Known Allergies    Family History   Problem Relation Age of Onset    Osteoporosis Mother     Vaginal cancer Mother     Stroke Father        Social History     Socioeconomic History    Marital status:    Occupational History    Occupation: retired   Tobacco Use    Smoking status: Former     Current packs/day: 0.00     Types: Cigarettes, Cigars     Quit date:      Years since quittin.9    Smokeless tobacco: Never   Substance and Sexual Activity    Alcohol use: Yes    Drug use: Never    Sexual activity: Not Currently   Social History Narrative    Live in assistive living center     Social Determinants of Health     Stress: No Stress Concern Present (2019)    [facility] of Occupational Health - Occupational Stress Questionnaire     Feeling of Stress : Not at all       Prior to meeting with the patient I reviewed the medical chart in [facility]. This included reviewing the previous progress notes from our office, review of the patient's last appointment with their primary care provider, review of any visits to the emergency room, and review of any pain management appointments or procedures.    History of present illness: 78-year-old male who returns to clinic today to follow-up on his bilateral knees.  He has known osteoarthritis in bilateral knees.  He has had viscosupplementation and steroid injections.  Last seen and evaluated in the office in August, May, February, November and prior to that 2022 or approximately 3 months ago.  His knees have started to bother him again.  He is here for repeat steroid injections.         Review of Systems:    Constitutional: Negative for chills, fever and weight loss.   HENT: Negative for congestion.    Eyes: Negative for discharge and redness.   Respiratory: Negative for cough and shortness of breath.    Cardiovascular: Negative for chest pain.   Gastrointestinal: Negative for nausea and vomiting.   Musculoskeletal: See HPI.    Skin: Negative for rash.   Neurological: Negative for headaches.   Endo/Heme/Allergies: Does not bruise/bleed easily.   Psychiatric/Behavioral: The patient is not nervous/anxious.    All other systems reviewed and are negative.       Objective:      Physical Examination:    Vital Signs:  There were no vitals filed for this visit.    Body mass index is 28.51 kg/m².    This a well-developed, well nourished patient in no acute distress.  They are alert and oriented and cooperative to examination.     Examination of the bilateral knees remains stable. Unchanged. The patient's skin is dry and intact, no erythema or ecchymosis, no large joint effusions are appreciated.  No signs symptoms of infection. range of motion, the right knee he may lack a few degrees of extension less than 5 can flex to 120.  Left knee 0-120.  Patellofemoral crepitus.  Medial joint line pain.  Stable anterior-posterior varus and valgus stress.       Pertinent New Results:        XRAY Report / Interpretation:           Assessment:       1. Primary osteoarthritis of right knee      Plan:     Primary osteoarthritis of right knee  -     X-Ray Knee 3 View Bilateral  -     Large Joint Aspiration/Injection: R knee        Follow up in about 3 months (around 3/13/2024) for right knee inj f/u.    78-year-old male, presents to clinic today again for his bilateral knees.  He has known osteoarthritis in his bilateral knees.  At his last visit in August we only injected the right knee.  His right knee seems to be more symptomatic than his left as he is only requesting an injection in the right knee today.  We have been seeing him periodically for knee injections.  We reinjected the right knee today today.  He has dementia, he lives at [facility], he does have a caretaker with him daily.       Aldo Shane, DEMETRIUS, PAKandiC    This note was created using [employer] voice recognition software that occasionally misinterprets words or phrases.

## 2023-12-13 NOTE — PROGRESS NOTES
[facility]  [address]  [place] [address]  Phone: [phone]   [phone]    Patient's PCP: Angel Edwards Jr., MD  Referring Provider: No ref. provider found    Subjective:      Chief Complaint:   Chief Complaint   Patient presents with    Left Knee - Pain     BL knee pain follow up. Received inj 08/08/23 which offered great relief as has PT    Right Knee - Pain     BL knee pain follow up. Received inj 08/08/23 which offered great relief as has PT. Requesting inj in right knee only       Past Medical History:   Diagnosis Date    Dementia with behavioral disturbance 12/23/2021    Hypercholesteremia     Hypertension     Progressive supranuclear ophthalmoplegia     Vitamin D deficiency        Past Surgical History:   Procedure Laterality Date    COLONOSCOPY      HERNIA REPAIR         Current Outpatient Medications   Medication Sig    acetaminophen (TYLENOL) 325 MG tablet Take 2 tablets (650 mg total) by mouth every 6 (six) hours as needed for Pain.    carbidopa-levodopa  mg (SINEMET)  mg per tablet Take 12 tablets by mouth As instructed. Take 2 tablets in the morning, 1 tablet at lunch and 1 at bedtime    cholecalciferol, vitamin D3, 125 mcg (5,000 unit) capsule TAKE ONE CAPSULE BY MOUTH DAILY    donepeziL (ARICEPT) 10 MG tablet Take 1 tablet (10 mg total) by mouth every evening.    meloxicam (MOBIC) 15 MG tablet Take 15 mg by mouth once daily.    multivitamin (THERAGRAN) per tablet Take 1 tablet by mouth once daily.    OXcarbazepine (TRILEPTAL) 150 MG Tab TAKE ONE TABLET BY MOUTH EVERY MORNING and TAKE ONE TABLET BY MOUTH EVERY EVENING    rOPINIRole (REQUIP) 0.5 MG tablet TAKE ONE TABLET BY MOUTH EVERY EVENING    rosuvastatin (CRESTOR) 10 MG tablet Take 1 tablet (10 mg total) by mouth once daily.    sertraline (ZOLOFT) 50 MG tablet TAKE ONE TABLET BY MOUTH EVERY EVENING     No current facility-administered medications for this visit.       Review of patient's  allergies indicates:  No Known Allergies    Family History   Problem Relation Age of Onset    Osteoporosis Mother     Vaginal cancer Mother     Stroke Father        Social History     Socioeconomic History    Marital status:    Occupational History    Occupation: retired   Tobacco Use    Smoking status: Former     Current packs/day: 0.00     Types: Cigarettes, Cigars     Quit date:      Years since quittin.9    Smokeless tobacco: Never   Substance and Sexual Activity    Alcohol use: Yes    Drug use: Never    Sexual activity: Not Currently   Social History Narrative    Live in assistive living center     Social Determinants of Health     Stress: No Stress Concern Present (2019)    [facility] of Occupational Health - Occupational Stress Questionnaire     Feeling of Stress : Not at all       Prior to meeting with the patient I reviewed the medical chart in [facility]. This included reviewing the previous progress notes from our office, review of the patient's last appointment with their primary care provider, review of any visits to the emergency room, and review of any pain management appointments or procedures.    History of present illness: ***      Review of Systems:    Constitutional: Negative for chills, fever and weight loss.   HENT: Negative for congestion.    Eyes: Negative for discharge and redness.   Respiratory: Negative for cough and shortness of breath.    Cardiovascular: Negative for chest pain.   Gastrointestinal: Negative for nausea and vomiting.   Musculoskeletal: See HPI.   Skin: Negative for rash.   Neurological: Negative for headaches.   Endo/Heme/Allergies: Does not bruise/bleed easily.   Psychiatric/Behavioral: The patient is not nervous/anxious.    All other systems reviewed and are negative.       Objective:      Physical Examination:    Vital Signs:  There were no vitals filed for this visit.    Body mass index is 28.51 kg/m².    This a well-developed, well nourished  patient in no acute distress.  They are alert and oriented and cooperative to examination.     ***      Pertinent New Results:        XRAY Report / Interpretation:   ***          Assessment:       1. Primary osteoarthritis of right knee    2. Primary osteoarthritis of left knee      Plan:     Primary osteoarthritis of right knee  -     X-Ray Knee 3 View Bilateral    Primary osteoarthritis of left knee  -     X-Ray Knee 3 View Bilateral        No follow-ups on file.    ***    [unfilled]  DEMETRIUS Will PAKandiC    This note was created using [employer] voice recognition software that occasionally misinterprets words or phrases.

## 2023-12-15 DIAGNOSIS — F03.918 DEMENTIA WITH BEHAVIORAL DISTURBANCE: ICD-10-CM

## 2023-12-18 RX ORDER — SERTRALINE HYDROCHLORIDE 50 MG/1
TABLET, FILM COATED ORAL
Qty: 90 TABLET | Refills: 1 | Status: SHIPPED | OUTPATIENT
Start: 2023-12-18

## 2023-12-18 RX ORDER — ROPINIROLE 0.5 MG/1
TABLET, FILM COATED ORAL
Qty: 90 TABLET | Refills: 1 | Status: SHIPPED | OUTPATIENT
Start: 2023-12-18

## 2023-12-18 RX ORDER — CHOLECALCIFEROL (VITAMIN D3) 125 MCG
CAPSULE ORAL
Qty: 90 CAPSULE | Refills: 1 | Status: SHIPPED | OUTPATIENT
Start: 2023-12-18

## 2024-01-11 DIAGNOSIS — Z00.00 ENCOUNTER FOR MEDICARE ANNUAL WELLNESS EXAM: ICD-10-CM

## 2024-01-19 DIAGNOSIS — E78.00 HYPERCHOLESTEREMIA: ICD-10-CM

## 2024-01-22 RX ORDER — ROSUVASTATIN CALCIUM 10 MG/1
10 TABLET, COATED ORAL
Qty: 90 TABLET | Refills: 1 | Status: SHIPPED | OUTPATIENT
Start: 2024-01-22

## 2024-01-28 DIAGNOSIS — F03.918 DEMENTIA WITH BEHAVIORAL DISTURBANCE: ICD-10-CM

## 2024-01-29 RX ORDER — DONEPEZIL HYDROCHLORIDE 10 MG/1
10 TABLET, FILM COATED ORAL NIGHTLY
Qty: 90 TABLET | Refills: 1 | Status: SHIPPED | OUTPATIENT
Start: 2024-01-29

## 2024-04-16 RX ORDER — ROPINIROLE 0.5 MG/1
TABLET, FILM COATED ORAL
Qty: 90 TABLET | Refills: 1 | Status: SHIPPED | OUTPATIENT
Start: 2024-04-16

## 2024-04-25 ENCOUNTER — OFFICE VISIT (OUTPATIENT)
Dept: ORTHOPEDICS | Facility: CLINIC | Age: 79
End: 2024-04-25
Payer: MEDICARE

## 2024-04-25 VITALS — HEIGHT: 67 IN | WEIGHT: 175 LBS | BODY MASS INDEX: 27.47 KG/M2

## 2024-04-25 DIAGNOSIS — M17.12 PRIMARY OSTEOARTHRITIS OF LEFT KNEE: ICD-10-CM

## 2024-04-25 DIAGNOSIS — M17.11 PRIMARY OSTEOARTHRITIS OF RIGHT KNEE: Primary | ICD-10-CM

## 2024-04-25 PROCEDURE — 20610 DRAIN/INJ JOINT/BURSA W/O US: CPT | Mod: 50,GW,GC,S$GLB | Performed by: PHYSICIAN ASSISTANT

## 2024-04-25 PROCEDURE — 99213 OFFICE O/P EST LOW 20 MIN: CPT | Mod: 25,GW,GC,S$GLB | Performed by: PHYSICIAN ASSISTANT

## 2024-04-25 RX ORDER — MELATONIN 10 MG
1 TABLET, SUBLINGUAL SUBLINGUAL NIGHTLY
COMMUNITY
Start: 2024-03-12

## 2024-04-25 RX ORDER — TRIAMCINOLONE ACETONIDE 40 MG/ML
40 INJECTION, SUSPENSION INTRA-ARTICULAR; INTRAMUSCULAR
Status: DISCONTINUED | OUTPATIENT
Start: 2024-04-25 | End: 2024-04-25 | Stop reason: HOSPADM

## 2024-04-25 RX ORDER — CALCIUM CARBONATE/VITAMIN D3 600 MG-10
TABLET ORAL
COMMUNITY
Start: 2024-03-19

## 2024-04-25 RX ADMIN — TRIAMCINOLONE ACETONIDE 40 MG: 40 INJECTION, SUSPENSION INTRA-ARTICULAR; INTRAMUSCULAR at 01:04

## 2024-04-25 NOTE — PROCEDURES
Large Joint Aspiration/Injection: L knee    Date/Time: 4/25/2024 1:45 PM    Performed by: Mekhi Gibson PA-C  Authorized by: Mekhi Gisbon PA-C    Consent Done?:  Yes (Verbal)  Indications:  Pain and arthritis  Site marked: the procedure site was marked    Timeout: prior to procedure the correct patient, procedure, and site was verified    Prep: patient was prepped and draped in usual sterile fashion      Local anesthesia used?: Yes    Local anesthetic:  Lidocaine 1% without epinephrine    Details:  Needle Size:  25 G  Ultrasonic Guidance for needle placement?: No    Location:  Knee  Site:  L knee  Medications:  40 mg triamcinolone acetonide 40 mg/mL  Patient tolerance:  Patient tolerated the procedure well with no immediate complications  Large Joint Aspiration/Injection: R knee    Date/Time: 4/25/2024 1:45 PM    Performed by: Mekhi Gibson PA-C  Authorized by: Mekhi Gibson PA-C    Consent Done?:  Yes (Verbal)  Indications:  Arthritis and pain  Site marked: the procedure site was marked    Timeout: prior to procedure the correct patient, procedure, and site was verified    Prep: patient was prepped and draped in usual sterile fashion      Local anesthesia used?: Yes    Local anesthetic:  Lidocaine 1% without epinephrine    Details:  Needle Size:  25 G  Ultrasonic Guidance for needle placement?: No    Location:  Knee  Site:  R knee  Medications:  40 mg triamcinolone acetonide 40 mg/mL  Patient tolerance:  Patient tolerated the procedure well with no immediate complications

## 2024-04-25 NOTE — PROGRESS NOTES
[facility]  [address]  [place] [address]  Phone: [phone]   [phone]    Patient's PCP: Angel Edwards Jr., MD  Referring Provider: No ref. provider found    Subjective:      Chief Complaint:   Chief Complaint   Patient presents with    Left Knee - Pain     Bilateral knee pain, has good days and bad, the previous injections have helped,     Right Knee - Pain       Past Medical History:   Diagnosis Date    Dementia with behavioral disturbance 12/23/2021    Hypercholesteremia     Hypertension     Progressive supranuclear ophthalmoplegia     Vitamin D deficiency        Past Surgical History:   Procedure Laterality Date    COLONOSCOPY      HERNIA REPAIR         Current Outpatient Medications   Medication Sig Dispense Refill    acetaminophen (TYLENOL) 325 MG tablet Take 2 tablets (650 mg total) by mouth every 6 (six) hours as needed for Pain.  0    carbidopa-levodopa  mg (SINEMET)  mg per tablet Take 12 tablets by mouth As instructed. Take 2 tablets in the morning, 1 tablet at lunch and 1 at bedtime      cholecalciferol, vitamin D3, 125 mcg (5,000 unit) capsule TAKE ONE CAPSULE BY MOUTH DAILY 90 capsule 1    donepeziL (ARICEPT) 10 MG tablet TAKE ONE TABLET BY MOUTH EVERY EVENING 90 tablet 1    melatonin 10 mg Subl Take 1 tablet by mouth every evening.      meloxicam (MOBIC) 15 MG tablet Take 15 mg by mouth once daily.      multivitamin (THERAGRAN) per tablet Take 1 tablet by mouth once daily.      OXcarbazepine (TRILEPTAL) 150 MG Tab TAKE ONE TABLET BY MOUTH EVERY MORNING and TAKE ONE TABLET BY MOUTH EVERY EVENING 180 tablet 1    rOPINIRole (REQUIP) 0.5 MG tablet TAKE ONE TABLET BY MOUTH EVERY EVENING 90 tablet 1    rosuvastatin (CRESTOR) 10 MG tablet TAKE ONE TABLET BY MOUTH EVERY DAY 90 tablet 1    sertraline (ZOLOFT) 50 MG tablet TAKE ONE TABLET BY MOUTH EVERY EVENING 90 tablet 1    VITAMIN B-1, MONONITRATE, 100 mg Tab AS DIRECTED       No current  facility-administered medications for this visit.       Review of patient's allergies indicates:  No Known Allergies    Family History   Problem Relation Name Age of Onset    Osteoporosis Mother      Vaginal cancer Mother      Stroke Father         Social History     Socioeconomic History    Marital status:    Occupational History    Occupation: retired   Tobacco Use    Smoking status: Former     Current packs/day: 0.00     Types: Cigarettes, Cigars     Quit date: 2009     Years since quitting: 15.3    Smokeless tobacco: Never   Substance and Sexual Activity    Alcohol use: Yes    Drug use: Never    Sexual activity: Not Currently   Social History Narrative    Live in assistive living center     Social Determinants of Health     Stress: No Stress Concern Present (12/2/2019)    [employer] [place] of Occupational Health - Occupational Stress Questionnaire     Feeling of Stress : Not at all       History of present illness: Mr. Phillip comes in today for follow-up for his bilateral knees.  He primarily has right knee pain today.  His left knee is not as symptomatic.  He was last seen and injected 4 months ago with good relief.  His pain has returned here recently.  Denies any new injury or trauma.  Requesting a repeat injection for the right knee.    Review of Systems:    Constitutional: Negative for chills, fever and weight loss.   HENT: Negative for congestion.    Eyes: Negative for discharge and redness.   Respiratory: Negative for cough and shortness of breath.    Cardiovascular: Negative for chest pain.   Gastrointestinal: Negative for nausea and vomiting.   Musculoskeletal: See HPI.   Skin: Negative for rash.   Neurological: Negative for headaches.   Endo/Heme/Allergies: Does not bruise/bleed easily.   Psychiatric/Behavioral: The patient is not nervous/anxious.    All other systems reviewed and are negative.       Objective:      Physical Examination:    Vital Signs:  There were no vitals filed for this  visit.    Body mass index is 27.41 kg/m².    This a well-developed, well nourished patient in no acute distress.  They are alert and oriented and cooperative to examination.     Bilateral knee exam:  Skin to bilateral knees clean dry and intact.  No erythema or ecchymosis bilaterally.  No signs or symptoms of infection bilaterally.  He is neurovascularly intact throughout bilateral lower extremities.  Bilateral calves soft and nontender.  Right knee range of motion 3-120.  Left knee range of motion 0-120.  Both knees stable to varus and valgus stresses.  He has diffuse patellofemoral crepitus bilaterally.  Both knees stable to varus and valgus stresses.  Diffusely tender medially bilaterally.  He can weightbear as tolerated on bilateral lower extremities.    Pertinent New Results:        XRAY Report / Interpretation:   No new radiographs taken on today's clinic visit.      Assessment:       1. Primary osteoarthritis of right knee    2. Primary osteoarthritis of left knee      Plan:     Primary osteoarthritis of right knee  -     X-Ray Knee 3 View Bilateral  -     Large Joint Aspiration/Injection    Primary osteoarthritis of left knee  -     X-Ray Knee 3 View Bilateral  -     Large Joint Aspiration/Injection: L knee    Other orders  -     Large Joint Aspiration/Injection        Follow up for 3 mth bilat knee inj 4.25.24.    The patient's request, I injected his bilateral knees today via an anterolateral approach with 40 mg of Kenalog and lidocaine respectively.  He tolerated these well.  We will set him for follow-up in 3 months for repeat injection if he has recurrence of symptoms.  If he is doing quite well, he will simply cancel follow-up on an as-needed basis whenever he does begin to experience pain again.        Mekhi Gibson, [facility], PA-C    This note was created using [employer] voice recognition software that occasionally misinterprets words or phrases.

## 2024-06-11 DIAGNOSIS — F03.918 DEMENTIA WITH BEHAVIORAL DISTURBANCE: ICD-10-CM

## 2024-06-11 RX ORDER — OXCARBAZEPINE 150 MG/1
TABLET, FILM COATED ORAL
Qty: 60 TABLET | Refills: 0 | Status: SHIPPED | OUTPATIENT
Start: 2024-06-11

## 2024-06-11 RX ORDER — CHOLECALCIFEROL (VITAMIN D3) 125 MCG
5000 CAPSULE ORAL DAILY
Qty: 90 CAPSULE | Refills: 1 | Status: SHIPPED | OUTPATIENT
Start: 2024-06-11

## 2024-07-31 ENCOUNTER — OFFICE VISIT (OUTPATIENT)
Dept: ORTHOPEDICS | Facility: CLINIC | Age: 79
End: 2024-07-31
Payer: MEDICARE

## 2024-07-31 VITALS — BODY MASS INDEX: 27.48 KG/M2 | HEIGHT: 67 IN | WEIGHT: 175.06 LBS

## 2024-07-31 DIAGNOSIS — M17.11 PRIMARY OSTEOARTHRITIS OF RIGHT KNEE: Primary | ICD-10-CM

## 2024-07-31 DIAGNOSIS — M17.12 PRIMARY OSTEOARTHRITIS OF LEFT KNEE: ICD-10-CM

## 2024-07-31 PROCEDURE — 99213 OFFICE O/P EST LOW 20 MIN: CPT | Mod: PBBFAC,PN | Performed by: PHYSICIAN ASSISTANT

## 2024-07-31 PROCEDURE — 20610 DRAIN/INJ JOINT/BURSA W/O US: CPT | Mod: PBBFAC,PN | Performed by: PHYSICIAN ASSISTANT

## 2024-07-31 PROCEDURE — 99999PBSHW PR PBB SHADOW TECHNICAL ONLY FILED TO HB: Mod: PBBFAC,,,

## 2024-07-31 PROCEDURE — 99999 PR PBB SHADOW E&M-EST. PATIENT-LVL III: CPT | Mod: PBBFAC,,, | Performed by: PHYSICIAN ASSISTANT

## 2024-07-31 RX ORDER — TRIAMCINOLONE ACETONIDE 40 MG/ML
40 INJECTION, SUSPENSION INTRA-ARTICULAR; INTRAMUSCULAR
Status: DISCONTINUED | OUTPATIENT
Start: 2024-07-31 | End: 2024-07-31 | Stop reason: HOSPADM

## 2024-07-31 RX ADMIN — TRIAMCINOLONE ACETONIDE 40 MG: 40 INJECTION, SUSPENSION INTRA-ARTICULAR; INTRAMUSCULAR at 02:07

## 2024-07-31 NOTE — PROGRESS NOTES
[facility]  [address] [address]  [place] [address]  Phone: [phone]   [phone]    Patient's PCP: Angel Edwards Jr., MD  Referring Provider: No ref. provider found    Subjective:      Chief Complaint:   Chief Complaint   Patient presents with    Left Knee - Pain     Last injected bilateral knees on 4/25/24, helped about 2 months, would like injections today    Right Knee - Pain       Past Medical History:   Diagnosis Date    Dementia with behavioral disturbance 12/23/2021    Hypercholesteremia     Hypertension     Progressive supranuclear ophthalmoplegia     Vitamin D deficiency        Past Surgical History:   Procedure Laterality Date    COLONOSCOPY      HERNIA REPAIR         Current Outpatient Medications   Medication Sig    acetaminophen (TYLENOL) 325 MG tablet Take 2 tablets (650 mg total) by mouth every 6 (six) hours as needed for Pain.    carbidopa-levodopa  mg (SINEMET)  mg per tablet Take 12 tablets by mouth As instructed. Take 2 tablets in the morning, 1 tablet at lunch and 1 at bedtime    cholecalciferol, vitamin D3, 125 mcg (5,000 unit) capsule Take 1 capsule (5,000 Units total) by mouth once daily.    donepeziL (ARICEPT) 10 MG tablet TAKE ONE TABLET BY MOUTH EVERY EVENING    meloxicam (MOBIC) 15 MG tablet Take 15 mg by mouth once daily.    OXcarbazepine (TRILEPTAL) 150 MG Tab TAKE ONE TABLET BY MOUTH EVERY MORNING AND TAKE ONE TABLET BY MOUTH EVERY EVENING    rOPINIRole (REQUIP) 0.5 MG tablet TAKE ONE TABLET BY MOUTH EVERY EVENING    rosuvastatin (CRESTOR) 10 MG tablet TAKE ONE TABLET BY MOUTH EVERY DAY    sertraline (ZOLOFT) 50 MG tablet TAKE ONE TABLET BY MOUTH EVERY EVENING    VITAMIN B-1, MONONITRATE, 100 mg Tab AS DIRECTED    melatonin 10 mg Subl Take 1 tablet by mouth every evening.    multivitamin (THERAGRAN) per tablet Take 1 tablet by mouth once daily.     No current facility-administered medications for this visit.       Review of patient's  allergies indicates:  No Known Allergies    Family History   Problem Relation Name Age of Onset    Osteoporosis Mother      Vaginal cancer Mother      Stroke Father         Social History     Socioeconomic History    Marital status:    Occupational History    Occupation: retired   Tobacco Use    Smoking status: Former     Current packs/day: 0.00     Types: Cigarettes, Cigars     Quit date: 2009     Years since quitting: 15.5    Smokeless tobacco: Never   Substance and Sexual Activity    Alcohol use: Yes    Drug use: Never    Sexual activity: Not Currently   Social History Narrative    Live in assistive living center     Social Determinants of Health     Stress: No Stress Concern Present (12/2/2019)    [facility]ational [facility] - Occupational Stress Questionnaire     Feeling of Stress : Not at all       Prior to meeting with the patient I reviewed the medical chart in [facility]. This included reviewing the previous progress notes from our office, review of the patient's last appointment with their primary care provider, review of any visits to the emergency room, and review of any pain management appointments or procedures.    History of present illness: 78 y.o. male, returns to clinic today to follow-up on his bilateral knees.  He has known osteoarthritis in bilateral knees.  He has had viscosupplementation and steroid injections.  Last seen and evaluated in the office in April this year.  His knees have started to bother him again.  He is here for repeat steroid injections.        Review of Systems:    Constitutional: Negative for chills, fever and weight loss.   HENT: Negative for congestion.    Eyes: Negative for discharge and redness.   Respiratory: Negative for cough and shortness of breath.    Cardiovascular: Negative for chest pain.   Gastrointestinal: Negative for nausea and vomiting.   Musculoskeletal: See HPI.   Skin: Negative for rash.   Neurological: Negative for headaches.    Endo/Heme/Allergies: Does not bruise/bleed easily.   Psychiatric/Behavioral: The patient is not nervous/anxious.    All other systems reviewed and are negative.       Objective:      Physical Examination:    Vital Signs:  There were no vitals filed for this visit.    Body mass index is 27.42 kg/m².    This a well-developed, well nourished patient in no acute distress.  They are alert and oriented and cooperative to examination.     Examination of the bilateral knees remains stable. Unchanged. The patient's skin is dry and intact, no erythema or ecchymosis, no large joint effusions are appreciated.  No signs symptoms of infection. range of motion, the right knee he may lack a few degrees of extension less than 5 can flex to 120.  Left knee 0-120.  Patellofemoral crepitus.  Medial joint line pain.  Stable anterior-posterior varus and valgus stress.       Pertinent New Results:          XRAY Report / Interpretation:   No new x-rays were taken today visit          Assessment:       1. Primary osteoarthritis of right knee    2. Primary osteoarthritis of left knee      Plan:     Primary osteoarthritis of right knee  -     Large Joint Aspiration/Injection: R knee    Primary osteoarthritis of left knee  -     Large Joint Aspiration/Injection: L knee        Follow up for 3 mof/u, b/l knee inj f/u.    78-year-old male, he has got round the clock, 24 hour care.  He is ambulating with a rolling walker.  We have been injecting his knees now for a while every 3-6 months.  He has got dementia, not a great surgical candidate.  Happy with the response he gets from the injections.  At his request we repeated the injections today.  Anterior lateral approach sterile technique lidocaine and triamcinolone bilateral knees.  We will see him back in 3 months.        Aldo Shane, DEMETRIUS, PA-C        This note was created using [employer] voice recognition software that occasionally misinterprets words or phrases.

## 2024-07-31 NOTE — PROCEDURES
Large Joint Aspiration/Injection: R knee    Date/Time: 7/31/2024 2:45 PM    Performed by: Aldo Shane PA  Authorized by: Aldo Shane PA    Consent Done?:  Yes (Verbal)  Indications:  Arthritis and pain  Site marked: the procedure site was marked    Timeout: prior to procedure the correct patient, procedure, and site was verified    Prep: patient was prepped and draped in usual sterile fashion      Local anesthesia used?: Yes    Local anesthetic:  Lidocaine 1% without epinephrine    Details:  Needle Size:  25 G  Ultrasonic Guidance for needle placement?: No    Location:  Knee  Site:  R knee  Medications:  40 mg triamcinolone acetonide 40 mg/mL  Patient tolerance:  Patient tolerated the procedure well with no immediate complications  Large Joint Aspiration/Injection: L knee    Date/Time: 7/31/2024 2:45 PM    Performed by: Aldo Shane PA  Authorized by: Aldo Shane PA    Consent Done?:  Yes (Verbal)  Indications:  Arthritis and pain  Site marked: the procedure site was marked    Timeout: prior to procedure the correct patient, procedure, and site was verified    Prep: patient was prepped and draped in usual sterile fashion      Local anesthesia used?: Yes    Local anesthetic:  Lidocaine 1% without epinephrine    Details:  Needle Size:  25 G  Ultrasonic Guidance for needle placement?: No    Location:  Knee  Site:  L knee  Medications:  40 mg triamcinolone acetonide 40 mg/mL  Patient tolerance:  Patient tolerated the procedure well with no immediate complications

## 2024-10-30 ENCOUNTER — OFFICE VISIT (OUTPATIENT)
Dept: ORTHOPEDICS | Facility: CLINIC | Age: 79
End: 2024-10-30
Payer: MEDICARE

## 2024-10-30 ENCOUNTER — HOSPITAL ENCOUNTER (OUTPATIENT)
Dept: RADIOLOGY | Facility: HOSPITAL | Age: 79
Discharge: HOME | End: 2024-10-30
Attending: PHYSICIAN ASSISTANT
Payer: MEDICARE

## 2024-10-30 VITALS — HEIGHT: 67 IN | WEIGHT: 175.06 LBS | BODY MASS INDEX: 27.48 KG/M2

## 2024-10-30 DIAGNOSIS — M17.12 PRIMARY OSTEOARTHRITIS OF LEFT KNEE: ICD-10-CM

## 2024-10-30 DIAGNOSIS — M17.11 PRIMARY OSTEOARTHRITIS OF RIGHT KNEE: Primary | ICD-10-CM

## 2024-10-30 PROCEDURE — 99999 PR PBB SHADOW E&M-EST. PATIENT-LVL III: CPT | Mod: PBBFAC,,, | Performed by: PHYSICIAN ASSISTANT

## 2024-10-30 PROCEDURE — 73562 X-RAY EXAM OF KNEE 3: CPT | Mod: TC,50,PN

## 2024-10-30 PROCEDURE — 99213 OFFICE O/P EST LOW 20 MIN: CPT | Mod: PBBFAC,25,PN | Performed by: PHYSICIAN ASSISTANT

## 2024-10-30 PROCEDURE — 20610 DRAIN/INJ JOINT/BURSA W/O US: CPT | Mod: PBBFAC,PN | Performed by: PHYSICIAN ASSISTANT

## 2024-10-30 PROCEDURE — 99999PBSHW PR PBB SHADOW TECHNICAL ONLY FILED TO HB: Mod: PBBFAC,,,

## 2024-10-30 PROCEDURE — 73562 X-RAY EXAM OF KNEE 3: CPT | Mod: 26,50,, | Performed by: RADIOLOGY

## 2024-10-30 RX ORDER — TRIAMCINOLONE ACETONIDE 40 MG/ML
40 INJECTION, SUSPENSION INTRA-ARTICULAR; INTRAMUSCULAR
Status: DISCONTINUED | OUTPATIENT
Start: 2024-10-30 | End: 2024-10-30 | Stop reason: HOSPADM

## 2024-10-30 RX ADMIN — TRIAMCINOLONE ACETONIDE 40 MG: 40 INJECTION, SUSPENSION INTRA-ARTICULAR; INTRAMUSCULAR at 01:10
